# Patient Record
Sex: FEMALE | Race: WHITE | NOT HISPANIC OR LATINO | Employment: UNEMPLOYED | URBAN - METROPOLITAN AREA
[De-identification: names, ages, dates, MRNs, and addresses within clinical notes are randomized per-mention and may not be internally consistent; named-entity substitution may affect disease eponyms.]

---

## 2017-02-16 RX ORDER — BUPROPION HYDROCHLORIDE 300 MG/1
TABLET ORAL
Qty: 30 TABLET | Refills: 3 | Status: SHIPPED | OUTPATIENT
Start: 2017-02-16 | End: 2017-03-14 | Stop reason: SDUPTHER

## 2017-03-03 RX ORDER — TRAZODONE HYDROCHLORIDE 50 MG/1
TABLET ORAL
Qty: 30 TABLET | Refills: 3 | Status: SHIPPED | OUTPATIENT
Start: 2017-03-03 | End: 2017-03-29 | Stop reason: SDUPTHER

## 2017-03-14 ENCOUNTER — TELEPHONE (OUTPATIENT)
Dept: FAMILY MEDICINE CLINIC | Facility: CLINIC | Age: 47
End: 2017-03-14

## 2017-03-14 RX ORDER — BUPROPION HYDROCHLORIDE 300 MG/1
300 TABLET ORAL EVERY MORNING
Qty: 90 TABLET | Refills: 1 | Status: SHIPPED | OUTPATIENT
Start: 2017-03-14 | End: 2018-01-05 | Stop reason: SDUPTHER

## 2017-03-29 RX ORDER — GABAPENTIN 300 MG/1
300 CAPSULE ORAL 3 TIMES DAILY
Qty: 270 CAPSULE | Refills: 1 | Status: SHIPPED | OUTPATIENT
Start: 2017-03-29 | End: 2017-09-27

## 2017-03-29 RX ORDER — BUSPIRONE HYDROCHLORIDE 10 MG/1
10 TABLET ORAL 2 TIMES DAILY
Qty: 180 TABLET | Refills: 1 | Status: SHIPPED | OUTPATIENT
Start: 2017-03-29 | End: 2017-10-13 | Stop reason: SDUPTHER

## 2017-03-29 RX ORDER — TRAZODONE HYDROCHLORIDE 50 MG/1
50 TABLET ORAL NIGHTLY
Qty: 90 TABLET | Refills: 1 | Status: SHIPPED | OUTPATIENT
Start: 2017-03-29 | End: 2017-12-18 | Stop reason: SDUPTHER

## 2017-09-25 DIAGNOSIS — Z00.00 ANNUAL PHYSICAL EXAM: Primary | ICD-10-CM

## 2017-09-26 ENCOUNTER — LAB (OUTPATIENT)
Dept: FAMILY MEDICINE CLINIC | Facility: CLINIC | Age: 47
End: 2017-09-26

## 2017-09-26 ENCOUNTER — APPOINTMENT (OUTPATIENT)
Dept: PREADMISSION TESTING | Facility: HOSPITAL | Age: 47
End: 2017-09-26

## 2017-09-26 DIAGNOSIS — M25.562 LEFT KNEE PAIN: ICD-10-CM

## 2017-09-27 ENCOUNTER — APPOINTMENT (OUTPATIENT)
Dept: PREADMISSION TESTING | Facility: HOSPITAL | Age: 47
End: 2017-09-27

## 2017-09-27 VITALS
TEMPERATURE: 97.8 F | HEART RATE: 66 BPM | SYSTOLIC BLOOD PRESSURE: 121 MMHG | DIASTOLIC BLOOD PRESSURE: 80 MMHG | RESPIRATION RATE: 18 BRPM | OXYGEN SATURATION: 100 % | WEIGHT: 215.6 LBS | HEIGHT: 69 IN | BODY MASS INDEX: 31.93 KG/M2

## 2017-09-27 LAB
25(OH)D3+25(OH)D2 SERPL-MCNC: 59.1 NG/ML (ref 30–100)
ALBUMIN SERPL-MCNC: 4.6 G/DL (ref 3.5–5.2)
ALBUMIN/GLOB SERPL: 1.8 G/DL
ALP SERPL-CCNC: 64 U/L (ref 39–117)
ALT SERPL-CCNC: 16 U/L (ref 1–33)
APPEARANCE UR: CLEAR
AST SERPL-CCNC: 14 U/L (ref 1–32)
BASOPHILS # BLD AUTO: 0.04 10*3/MM3 (ref 0–0.2)
BASOPHILS NFR BLD AUTO: 0.7 % (ref 0–1.5)
BILIRUB SERPL-MCNC: 0.5 MG/DL (ref 0.1–1.2)
BILIRUB UR QL STRIP: NEGATIVE
BUN SERPL-MCNC: 14 MG/DL (ref 6–20)
BUN/CREAT SERPL: 13 (ref 7–25)
CALCIUM SERPL-MCNC: 9.8 MG/DL (ref 8.6–10.5)
CHLORIDE SERPL-SCNC: 101 MMOL/L (ref 98–107)
CHOLEST SERPL-MCNC: 215 MG/DL (ref 0–200)
CO2 SERPL-SCNC: 29.4 MMOL/L (ref 22–29)
COLOR UR: ABNORMAL
CREAT SERPL-MCNC: 1.08 MG/DL (ref 0.57–1)
EOSINOPHIL # BLD AUTO: 0.17 10*3/MM3 (ref 0–0.7)
EOSINOPHIL NFR BLD AUTO: 2.8 % (ref 0.3–6.2)
ERYTHROCYTE [DISTWIDTH] IN BLOOD BY AUTOMATED COUNT: 12.9 % (ref 11.7–13)
GLOBULIN SER CALC-MCNC: 2.6 GM/DL
GLUCOSE SERPL-MCNC: 90 MG/DL (ref 65–99)
GLUCOSE UR QL: NEGATIVE
HCT VFR BLD AUTO: 39.8 % (ref 35.6–45.5)
HDLC SERPL-MCNC: 62 MG/DL (ref 40–60)
HGB BLD-MCNC: 13.3 G/DL (ref 11.9–15.5)
HGB UR QL STRIP: NEGATIVE
IMM GRANULOCYTES # BLD: 0 10*3/MM3 (ref 0–0.03)
IMM GRANULOCYTES NFR BLD: 0 % (ref 0–0.5)
KETONES UR QL STRIP: NEGATIVE
LDLC SERPL CALC-MCNC: 130 MG/DL (ref 0–100)
LDLC/HDLC SERPL: 2.09 {RATIO}
LEUKOCYTE ESTERASE UR QL STRIP: NEGATIVE
LYMPHOCYTES # BLD AUTO: 2.1 10*3/MM3 (ref 0.9–4.8)
LYMPHOCYTES NFR BLD AUTO: 35.1 % (ref 19.6–45.3)
MCH RBC QN AUTO: 30 PG (ref 26.9–32)
MCHC RBC AUTO-ENTMCNC: 33.4 G/DL (ref 32.4–36.3)
MCV RBC AUTO: 89.6 FL (ref 80.5–98.2)
MONOCYTES # BLD AUTO: 0.56 10*3/MM3 (ref 0.2–1.2)
MONOCYTES NFR BLD AUTO: 9.3 % (ref 5–12)
NEUTROPHILS # BLD AUTO: 3.12 10*3/MM3 (ref 1.9–8.1)
NEUTROPHILS NFR BLD AUTO: 52.1 % (ref 42.7–76)
NITRITE UR QL STRIP: NEGATIVE
PH UR STRIP: 5.5 [PH] (ref 5–8)
PLATELET # BLD AUTO: 314 10*3/MM3 (ref 140–500)
POTASSIUM SERPL-SCNC: 4.4 MMOL/L (ref 3.5–5.2)
PROT SERPL-MCNC: 7.2 G/DL (ref 6–8.5)
PROT UR QL STRIP: NEGATIVE
RBC # BLD AUTO: 4.44 10*6/MM3 (ref 3.9–5.2)
SODIUM SERPL-SCNC: 142 MMOL/L (ref 136–145)
SP GR UR: 1.02 (ref 1–1.03)
T4 FREE SERPL-MCNC: 1.27 NG/DL (ref 0.93–1.7)
TRIGL SERPL-MCNC: 116 MG/DL (ref 0–150)
TSH SERPL DL<=0.005 MIU/L-ACNC: 2.93 MIU/ML (ref 0.27–4.2)
UROBILINOGEN UR STRIP-MCNC: ABNORMAL MG/DL
VLDLC SERPL CALC-MCNC: 23.2 MG/DL (ref 5–40)
WBC # BLD AUTO: 5.99 10*3/MM3 (ref 4.5–10.7)

## 2017-09-27 RX ORDER — ELETRIPTAN HYDROBROMIDE 20 MG/1
20 TABLET, FILM COATED ORAL ONCE AS NEEDED
COMMUNITY
End: 2019-10-28

## 2017-09-27 RX ORDER — GABAPENTIN 300 MG/1
300 CAPSULE ORAL EVERY MORNING
COMMUNITY
End: 2017-10-06 | Stop reason: SDUPTHER

## 2017-09-27 RX ORDER — ONDANSETRON HYDROCHLORIDE 8 MG/1
8 TABLET, FILM COATED ORAL EVERY 8 HOURS PRN
COMMUNITY
End: 2022-10-19

## 2017-09-27 RX ORDER — ALBUTEROL SULFATE 90 UG/1
2 AEROSOL, METERED RESPIRATORY (INHALATION) EVERY 4 HOURS PRN
COMMUNITY
End: 2022-03-30 | Stop reason: SDUPTHER

## 2017-09-27 RX ORDER — GABAPENTIN 300 MG/1
600 CAPSULE ORAL NIGHTLY
COMMUNITY
End: 2018-01-02 | Stop reason: SDUPTHER

## 2017-09-27 RX ORDER — NAPROXEN 500 MG/1
500 TABLET ORAL 2 TIMES DAILY
COMMUNITY
End: 2018-03-20 | Stop reason: SDUPTHER

## 2017-10-03 ENCOUNTER — ANESTHESIA EVENT (OUTPATIENT)
Dept: PERIOP | Facility: HOSPITAL | Age: 47
End: 2017-10-03

## 2017-10-03 ENCOUNTER — ANESTHESIA (OUTPATIENT)
Dept: PERIOP | Facility: HOSPITAL | Age: 47
End: 2017-10-03

## 2017-10-03 ENCOUNTER — HOSPITAL ENCOUNTER (OUTPATIENT)
Facility: HOSPITAL | Age: 47
Setting detail: HOSPITAL OUTPATIENT SURGERY
Discharge: HOME | End: 2017-10-03
Attending: ORTHOPAEDIC SURGERY | Admitting: ORTHOPAEDIC SURGERY

## 2017-10-03 VITALS
DIASTOLIC BLOOD PRESSURE: 81 MMHG | RESPIRATION RATE: 16 BRPM | TEMPERATURE: 97.1 F | OXYGEN SATURATION: 95 % | SYSTOLIC BLOOD PRESSURE: 117 MMHG | HEART RATE: 63 BPM

## 2017-10-03 LAB
B-HCG UR QL: NEGATIVE
INTERNAL NEGATIVE CONTROL: NEGATIVE
INTERNAL POSITIVE CONTROL: POSITIVE
Lab: NORMAL

## 2017-10-03 PROCEDURE — 25010000002 ROPIVACAINE PER 1 MG: Performed by: ANESTHESIOLOGY

## 2017-10-03 PROCEDURE — 25010000002 MIDAZOLAM PER 1 MG: Performed by: ANESTHESIOLOGY

## 2017-10-03 PROCEDURE — 25010000002 DEXAMETHASONE PER 1 MG: Performed by: NURSE ANESTHETIST, CERTIFIED REGISTERED

## 2017-10-03 PROCEDURE — 25010000002 PROPOFOL 10 MG/ML EMULSION: Performed by: NURSE ANESTHETIST, CERTIFIED REGISTERED

## 2017-10-03 PROCEDURE — 25010000002 DEXAMETHASONE PER 1 MG: Performed by: ANESTHESIOLOGY

## 2017-10-03 PROCEDURE — 25010000002 MIDAZOLAM PER 1 MG: Performed by: NURSE ANESTHETIST, CERTIFIED REGISTERED

## 2017-10-03 PROCEDURE — 25010000002 ONDANSETRON PER 1 MG: Performed by: NURSE ANESTHETIST, CERTIFIED REGISTERED

## 2017-10-03 PROCEDURE — 25010000002 PROPOFOL 1000 MG/ML EMULSION: Performed by: NURSE ANESTHETIST, CERTIFIED REGISTERED

## 2017-10-03 PROCEDURE — C1713 ANCHOR/SCREW BN/BN,TIS/BN: HCPCS | Performed by: ORTHOPAEDIC SURGERY

## 2017-10-03 PROCEDURE — L3908 WHO COCK-UP NONMOLDE PRE OTS: HCPCS | Performed by: ORTHOPAEDIC SURGERY

## 2017-10-03 PROCEDURE — 25010000002 FENTANYL CITRATE (PF) 100 MCG/2ML SOLUTION: Performed by: ANESTHESIOLOGY

## 2017-10-03 DEVICE — SUT/ANCH JUGGERKNOT SFT RIGID SHT SZ1 1.4MM W/BIT: Type: IMPLANTABLE DEVICE | Site: ELBOW | Status: FUNCTIONAL

## 2017-10-03 RX ORDER — MIDAZOLAM HYDROCHLORIDE 1 MG/ML
1 INJECTION INTRAMUSCULAR; INTRAVENOUS
Status: DISCONTINUED | OUTPATIENT
Start: 2017-10-03 | End: 2017-10-03 | Stop reason: HOSPADM

## 2017-10-03 RX ORDER — LABETALOL HYDROCHLORIDE 5 MG/ML
5 INJECTION, SOLUTION INTRAVENOUS
Status: DISCONTINUED | OUTPATIENT
Start: 2017-10-03 | End: 2017-10-03 | Stop reason: HOSPADM

## 2017-10-03 RX ORDER — FENTANYL CITRATE 50 UG/ML
100 INJECTION, SOLUTION INTRAMUSCULAR; INTRAVENOUS
Status: DISCONTINUED | OUTPATIENT
Start: 2017-10-03 | End: 2017-10-03 | Stop reason: HOSPADM

## 2017-10-03 RX ORDER — PROPOFOL 10 MG/ML
VIAL (ML) INTRAVENOUS AS NEEDED
Status: DISCONTINUED | OUTPATIENT
Start: 2017-10-03 | End: 2017-10-03 | Stop reason: SURG

## 2017-10-03 RX ORDER — SCOLOPAMINE TRANSDERMAL SYSTEM 1 MG/1
1 PATCH, EXTENDED RELEASE TRANSDERMAL ONCE
Status: DISCONTINUED | OUTPATIENT
Start: 2017-10-03 | End: 2017-10-03 | Stop reason: HOSPADM

## 2017-10-03 RX ORDER — MIDAZOLAM HYDROCHLORIDE 1 MG/ML
INJECTION INTRAMUSCULAR; INTRAVENOUS AS NEEDED
Status: DISCONTINUED | OUTPATIENT
Start: 2017-10-03 | End: 2017-10-03 | Stop reason: SURG

## 2017-10-03 RX ORDER — ONDANSETRON 2 MG/ML
4 INJECTION INTRAMUSCULAR; INTRAVENOUS ONCE AS NEEDED
Status: DISCONTINUED | OUTPATIENT
Start: 2017-10-03 | End: 2017-10-03 | Stop reason: HOSPADM

## 2017-10-03 RX ORDER — DIPHENHYDRAMINE HYDROCHLORIDE 50 MG/ML
6.25 INJECTION INTRAMUSCULAR; INTRAVENOUS
Status: DISCONTINUED | OUTPATIENT
Start: 2017-10-03 | End: 2017-10-03 | Stop reason: HOSPADM

## 2017-10-03 RX ORDER — MIDAZOLAM HYDROCHLORIDE 1 MG/ML
2 INJECTION INTRAMUSCULAR; INTRAVENOUS
Status: DISCONTINUED | OUTPATIENT
Start: 2017-10-03 | End: 2017-10-03 | Stop reason: HOSPADM

## 2017-10-03 RX ORDER — ONDANSETRON 2 MG/ML
INJECTION INTRAMUSCULAR; INTRAVENOUS AS NEEDED
Status: DISCONTINUED | OUTPATIENT
Start: 2017-10-03 | End: 2017-10-03 | Stop reason: SURG

## 2017-10-03 RX ORDER — FAMOTIDINE 10 MG/ML
20 INJECTION, SOLUTION INTRAVENOUS ONCE
Status: COMPLETED | OUTPATIENT
Start: 2017-10-03 | End: 2017-10-03

## 2017-10-03 RX ORDER — HYDROMORPHONE HYDROCHLORIDE 1 MG/ML
0.5 INJECTION, SOLUTION INTRAMUSCULAR; INTRAVENOUS; SUBCUTANEOUS
Status: DISCONTINUED | OUTPATIENT
Start: 2017-10-03 | End: 2017-10-03 | Stop reason: HOSPADM

## 2017-10-03 RX ORDER — SODIUM CHLORIDE 0.9 % (FLUSH) 0.9 %
1-10 SYRINGE (ML) INJECTION AS NEEDED
Status: DISCONTINUED | OUTPATIENT
Start: 2017-10-03 | End: 2017-10-03 | Stop reason: HOSPADM

## 2017-10-03 RX ORDER — LIDOCAINE HYDROCHLORIDE 20 MG/ML
INJECTION, SOLUTION INFILTRATION; PERINEURAL AS NEEDED
Status: DISCONTINUED | OUTPATIENT
Start: 2017-10-03 | End: 2017-10-03 | Stop reason: SURG

## 2017-10-03 RX ORDER — LIDOCAINE HYDROCHLORIDE 10 MG/ML
0.5 INJECTION, SOLUTION EPIDURAL; INFILTRATION; INTRACAUDAL; PERINEURAL ONCE AS NEEDED
Status: COMPLETED | OUTPATIENT
Start: 2017-10-03 | End: 2017-10-03

## 2017-10-03 RX ORDER — HYDRALAZINE HYDROCHLORIDE 20 MG/ML
5 INJECTION INTRAMUSCULAR; INTRAVENOUS
Status: DISCONTINUED | OUTPATIENT
Start: 2017-10-03 | End: 2017-10-03 | Stop reason: HOSPADM

## 2017-10-03 RX ORDER — DEXAMETHASONE SODIUM PHOSPHATE 4 MG/ML
INJECTION, SOLUTION INTRA-ARTICULAR; INTRALESIONAL; INTRAMUSCULAR; INTRAVENOUS; SOFT TISSUE AS NEEDED
Status: DISCONTINUED | OUTPATIENT
Start: 2017-10-03 | End: 2017-10-03 | Stop reason: SURG

## 2017-10-03 RX ORDER — OXYCODONE HYDROCHLORIDE AND ACETAMINOPHEN 5; 325 MG/1; MG/1
2 TABLET ORAL ONCE AS NEEDED
Status: DISCONTINUED | OUTPATIENT
Start: 2017-10-03 | End: 2017-10-03 | Stop reason: HOSPADM

## 2017-10-03 RX ORDER — FENTANYL CITRATE 50 UG/ML
50 INJECTION, SOLUTION INTRAMUSCULAR; INTRAVENOUS
Status: DISCONTINUED | OUTPATIENT
Start: 2017-10-03 | End: 2017-10-03 | Stop reason: HOSPADM

## 2017-10-03 RX ORDER — HYDROCODONE BITARTRATE AND ACETAMINOPHEN 7.5; 325 MG/1; MG/1
1 TABLET ORAL ONCE AS NEEDED
Status: DISCONTINUED | OUTPATIENT
Start: 2017-10-03 | End: 2017-10-03 | Stop reason: HOSPADM

## 2017-10-03 RX ORDER — BUPIVACAINE HYDROCHLORIDE AND EPINEPHRINE 2.5; 5 MG/ML; UG/ML
INJECTION, SOLUTION EPIDURAL; INFILTRATION; INTRACAUDAL; PERINEURAL AS NEEDED
Status: DISCONTINUED | OUTPATIENT
Start: 2017-10-03 | End: 2017-10-03 | Stop reason: SURG

## 2017-10-03 RX ORDER — EPHEDRINE SULFATE 50 MG/ML
5 INJECTION, SOLUTION INTRAVENOUS ONCE AS NEEDED
Status: DISCONTINUED | OUTPATIENT
Start: 2017-10-03 | End: 2017-10-03 | Stop reason: HOSPADM

## 2017-10-03 RX ORDER — CLINDAMYCIN PHOSPHATE 900 MG/50ML
900 INJECTION INTRAVENOUS ONCE
Status: COMPLETED | OUTPATIENT
Start: 2017-10-03 | End: 2017-10-03

## 2017-10-03 RX ORDER — FLUMAZENIL 0.1 MG/ML
0.2 INJECTION INTRAVENOUS AS NEEDED
Status: DISCONTINUED | OUTPATIENT
Start: 2017-10-03 | End: 2017-10-03 | Stop reason: HOSPADM

## 2017-10-03 RX ORDER — DEXAMETHASONE SODIUM PHOSPHATE 10 MG/ML
INJECTION INTRAMUSCULAR; INTRAVENOUS AS NEEDED
Status: DISCONTINUED | OUTPATIENT
Start: 2017-10-03 | End: 2017-10-03 | Stop reason: SURG

## 2017-10-03 RX ORDER — SODIUM CHLORIDE, SODIUM LACTATE, POTASSIUM CHLORIDE, CALCIUM CHLORIDE 600; 310; 30; 20 MG/100ML; MG/100ML; MG/100ML; MG/100ML
9 INJECTION, SOLUTION INTRAVENOUS CONTINUOUS
Status: DISCONTINUED | OUTPATIENT
Start: 2017-10-03 | End: 2017-10-03 | Stop reason: HOSPADM

## 2017-10-03 RX ORDER — ROPIVACAINE HYDROCHLORIDE 5 MG/ML
INJECTION, SOLUTION EPIDURAL; INFILTRATION; PERINEURAL AS NEEDED
Status: DISCONTINUED | OUTPATIENT
Start: 2017-10-03 | End: 2017-10-03 | Stop reason: SURG

## 2017-10-03 RX ADMIN — ROPIVACAINE HYDROCHLORIDE 30 ML: 5 INJECTION, SOLUTION EPIDURAL; INFILTRATION; PERINEURAL at 13:05

## 2017-10-03 RX ADMIN — DEXAMETHASONE SODIUM PHOSPHATE 4 MG: 4 INJECTION, SOLUTION INTRAMUSCULAR; INTRAVENOUS at 13:05

## 2017-10-03 RX ADMIN — FENTANYL CITRATE 50 MCG: 50 INJECTION INTRAMUSCULAR; INTRAVENOUS at 11:26

## 2017-10-03 RX ADMIN — DEXAMETHASONE SODIUM PHOSPHATE 4 MG: 10 INJECTION INTRAMUSCULAR; INTRAVENOUS at 13:50

## 2017-10-03 RX ADMIN — ONDANSETRON 4 MG: 2 INJECTION INTRAMUSCULAR; INTRAVENOUS at 14:01

## 2017-10-03 RX ADMIN — LIDOCAINE HYDROCHLORIDE 100 MG: 20 INJECTION, SOLUTION INFILTRATION; PERINEURAL at 13:25

## 2017-10-03 RX ADMIN — PROPOFOL 200 MG: 10 INJECTION, EMULSION INTRAVENOUS at 13:25

## 2017-10-03 RX ADMIN — SCOPALAMINE 1 PATCH: 1 PATCH, EXTENDED RELEASE TRANSDERMAL at 11:21

## 2017-10-03 RX ADMIN — SODIUM CHLORIDE, POTASSIUM CHLORIDE, SODIUM LACTATE AND CALCIUM CHLORIDE 9 ML/HR: 600; 310; 30; 20 INJECTION, SOLUTION INTRAVENOUS at 11:19

## 2017-10-03 RX ADMIN — MIDAZOLAM 2 MG: 1 INJECTION INTRAMUSCULAR; INTRAVENOUS at 11:26

## 2017-10-03 RX ADMIN — SODIUM CHLORIDE, POTASSIUM CHLORIDE, SODIUM LACTATE AND CALCIUM CHLORIDE: 600; 310; 30; 20 INJECTION, SOLUTION INTRAVENOUS at 14:00

## 2017-10-03 RX ADMIN — MIDAZOLAM HYDROCHLORIDE 2 MG: 1 INJECTION, SOLUTION INTRAMUSCULAR; INTRAVENOUS at 13:25

## 2017-10-03 RX ADMIN — BUPIVACAINE HYDROCHLORIDE AND EPINEPHRINE BITARTRATE 10 ML: 2.5; .0091 INJECTION, SOLUTION EPIDURAL; INFILTRATION; INTRACAUDAL; PERINEURAL at 13:05

## 2017-10-03 RX ADMIN — LIDOCAINE HYDROCHLORIDE 0.5 ML: 10 INJECTION, SOLUTION EPIDURAL; INFILTRATION; INTRACAUDAL; PERINEURAL at 11:19

## 2017-10-03 RX ADMIN — PROPOFOL 200 MCG/KG/MIN: 10 INJECTION, EMULSION INTRAVENOUS at 13:25

## 2017-10-03 RX ADMIN — FAMOTIDINE 20 MG: 10 INJECTION, SOLUTION INTRAVENOUS at 11:22

## 2017-10-03 RX ADMIN — CLINDAMYCIN PHOSPHATE 900 MG: 900 INJECTION INTRAVENOUS at 13:23

## 2017-10-03 NOTE — PLAN OF CARE
Problem: Patient Care Overview (Adult)  Goal: Discharge Needs Assessment  Outcome: Outcome(s) achieved Date Met:  10/03/17    10/03/17 1526   Discharge Needs Assessment   Concerns To Be Addressed denies needs/concerns at this time   Living Environment   Transportation Available car

## 2017-10-03 NOTE — BRIEF OP NOTE
TENNIS ELBOW RELEASE  Progress Note    Eugenie Rapp  10/3/2017    Pre-op Diagnosis:   Lt ext tendon tear and lat epi    Post-op Diagnosis:     same    Procedure/CPT® Codes:      Procedure(s):  LEFT ELBOW OPEN EXTENSOR TENDON DEBRIDEMENT AND REPAIR    Surgeon(s):  TASH Aly MD    Anesthesia: General with Block    Staff:   Circulator: Yenifer Rosen RN  Scrub Person: Mariangel Hung  Vendor Representative: Lindsay Ventura  Assistant: Annetet Monroy    Estimated Blood Loss: minimal    Urine Voided: * No values recorded between 10/3/2017  1:14 PM and 10/3/2017  1:59 PM *    Specimens:                None      Drains:           Findings: above    Complications: none known      PETER Aly MD     Date: 10/3/2017  Time: 1:59 PM

## 2017-10-03 NOTE — PLAN OF CARE
Problem: Patient Care Overview (Adult)  Goal: Plan of Care Review  Outcome: Outcome(s) achieved Date Met:  10/03/17    10/03/17 1526   Coping/Psychosocial Response Interventions   Plan Of Care Reviewed With patient;spouse   Patient Care Overview   Progress improving

## 2017-10-03 NOTE — ANESTHESIA POSTPROCEDURE EVALUATION
Patient: Eugenie Rapp    Procedure Summary     Date Anesthesia Start Anesthesia Stop Room / Location    10/03/17 [id number]  [facility]       Procedure Diagnosis Surgeon Provider    LEFT ELBOW OPEN EXTENSOR TENDON DEBRIDEMENT AND REPAIR (Left Elbow) No diagnosis on file. MD Mayco Prince MD          Anesthesia Type: general  Last vitals  BP   117/81 (10/03/17 1515)    Temp        Pulse   63 (10/03/17 1515)   Resp   16 (10/03/17 1515)    SpO2   95 % (10/03/17 1515)      Post Anesthesia Care and Evaluation    Patient location during evaluation: bedside  Patient participation: complete - patient participated  Level of consciousness: awake  Pain score: 1  Pain management: adequate  Airway patency: patent  Anesthetic complications: No anesthetic complications    Cardiovascular status: acceptable  Respiratory status: acceptable  Hydration status: acceptable    Comments: --------------------            10/03/17               1515     --------------------   BP:       117/81     Pulse:      63       Resp:       16       Temp:                SpO2:      95%      --------------------

## 2017-10-03 NOTE — ANESTHESIA PREPROCEDURE EVALUATION
Anesthesia Evaluation     Patient summary reviewed and Nursing notes reviewed   history of anesthetic complications: PONV  NPO Solid Status: > 8 hours       Airway   Mallampati: II  TM distance: >3 FB  Neck ROM: full  no difficulty expected  Dental - normal exam     Pulmonary - negative pulmonary ROS and normal exam   Cardiovascular - negative cardio ROS and normal exam        Neuro/Psych- negative ROS  GI/Hepatic/Renal/Endo - negative ROS     Musculoskeletal (-) negative ROS    Abdominal  - normal exam   Substance History - negative use     OB/GYN negative ob/gyn ROS         Other                                      Anesthesia Plan    ASA 2     general   total IV anesthesia(Bp popc)  intravenous induction   Anesthetic plan and risks discussed with patient.    Plan discussed with CRNA.

## 2017-10-03 NOTE — ANESTHESIA PROCEDURE NOTES
Peripheral Block    Patient location during procedure: pre-op  Start time: 10/3/2017 12:52 PM  Stop time: 10/3/2017 1:05 PM  Reason for block: at surgeon's request and post-op pain management  Performed by  Anesthesiologist: CAT JC  Preanesthetic Checklist  Completed: patient identified, site marked, surgical consent, pre-op evaluation, timeout performed, IV checked, risks and benefits discussed and monitors and equipment checked  Prep:  Sterile barriers:gloves and cap  Prep: ChloraPrep  Patient monitoring: blood pressure monitoring, continuous pulse oximetry and EKG  Procedure  Sedation:yes    Guidance:ultrasound guided  ULTRASOUND INTERPRETATION.  Using ultrasound guidance a 21 G gauge needle was placed in close proximity to the brachial plexus nerve, at which point, under ultrasound guidance anesthetic was injected in the area of the nerve and spread of the anesthesia was seen on ultrasound in close proximity thereto.  There were no abnormalities seen on ultrasound; a digital image was taken; and the patient tolerated the procedure with no complications. Images:still images obtained    Laterality:left  Block Type:supraclavicular  Injection Technique:single-shot  Needle Type:echogenic  Needle Gauge:21 G    Medications  Comment:Dexamethasone 4 mg  Local Injected:ropivacaine 0.5% without epinephrine and bupivacaine 0.25% with epinephrine Local Amount Injected:40mL  Post Assessment  Injection Assessment: negative aspiration for heme, no paresthesia on injection and incremental injection  Patient Tolerance:comfortable throughout block  Complications:no  Additional Notes  Ultrasound interpretation:  Needle was seen in close proximity to nerve.  Local anesthetic seen surrounding area of nerve.  No abnormalities noted.  Still image obtained.

## 2017-10-03 NOTE — PLAN OF CARE
Problem: Patient Care Overview (Adult)  Goal: Plan of Care Review  Outcome: Ongoing (interventions implemented as appropriate)    10/03/17 1030   Coping/Psychosocial Response Interventions   Plan Of Care Reviewed With patient       Goal: Discharge Needs Assessment  Outcome: Ongoing (interventions implemented as appropriate)    10/03/17 1030   Discharge Needs Assessment   Concerns To Be Addressed denies needs/concerns at this time   Living Environment   Transportation Available car

## 2017-10-03 NOTE — PLAN OF CARE
Problem: Perioperative Period (Adult)  Goal: Signs and Symptoms of Listed Potential Problems Will be Absent or Manageable (Perioperative Period)  Outcome: Ongoing (interventions implemented as appropriate)    10/03/17 1030   Perioperative Period   Problems Assessed (Perioperative Period) all

## 2017-10-03 NOTE — PLAN OF CARE
Problem: Perioperative Period (Adult)  Goal: Signs and Symptoms of Listed Potential Problems Will be Absent or Manageable (Perioperative Period)  Outcome: Outcome(s) achieved Date Met:  10/03/17    10/03/17 1526   Perioperative Period   Problems Assessed (Perioperative Period) all   Problems Present (Perioperative Period) none

## 2017-10-03 NOTE — ANESTHESIA PROCEDURE NOTES
Airway  Urgency: elective    Airway not difficult    General Information and Staff    Patient location during procedure: OR  Anesthesiologist: TRISHA PATINO  CRNA: MIGUEL GEORGE    Indications and Patient Condition  Indications for airway management: airway protection    Preoxygenated: yes  MILS not maintained throughout  Mask difficulty assessment: 1 - vent by mask    Final Airway Details  Final airway type: supraglottic airway      Successful airway: classic  Size 4    Number of attempts at approach: 1    Additional Comments  Lma insertion appears atraumatic. Dentition intact.

## 2017-10-06 ENCOUNTER — TELEPHONE (OUTPATIENT)
Dept: FAMILY MEDICINE CLINIC | Facility: CLINIC | Age: 47
End: 2017-10-06

## 2017-10-06 ENCOUNTER — OFFICE VISIT (OUTPATIENT)
Dept: FAMILY MEDICINE CLINIC | Facility: CLINIC | Age: 47
End: 2017-10-06

## 2017-10-06 VITALS
SYSTOLIC BLOOD PRESSURE: 120 MMHG | OXYGEN SATURATION: 98 % | HEART RATE: 64 BPM | HEIGHT: 69 IN | TEMPERATURE: 98.6 F | BODY MASS INDEX: 32.66 KG/M2 | DIASTOLIC BLOOD PRESSURE: 70 MMHG | WEIGHT: 220.5 LBS

## 2017-10-06 DIAGNOSIS — K21.9 GASTROESOPHAGEAL REFLUX DISEASE, ESOPHAGITIS PRESENCE NOT SPECIFIED: ICD-10-CM

## 2017-10-06 DIAGNOSIS — Z00.00 WELL ADULT EXAM: Primary | ICD-10-CM

## 2017-10-06 DIAGNOSIS — M25.551 CHRONIC ARTHRALGIAS OF KNEES AND HIPS: ICD-10-CM

## 2017-10-06 DIAGNOSIS — M25.552 CHRONIC ARTHRALGIAS OF KNEES AND HIPS: ICD-10-CM

## 2017-10-06 DIAGNOSIS — M25.561 CHRONIC ARTHRALGIAS OF KNEES AND HIPS: ICD-10-CM

## 2017-10-06 DIAGNOSIS — M25.562 CHRONIC ARTHRALGIAS OF KNEES AND HIPS: ICD-10-CM

## 2017-10-06 DIAGNOSIS — G89.29 CHRONIC ARTHRALGIAS OF KNEES AND HIPS: ICD-10-CM

## 2017-10-06 PROCEDURE — 99396 PREV VISIT EST AGE 40-64: CPT | Performed by: INTERNAL MEDICINE

## 2017-10-06 RX ORDER — LIDOCAINE 50 MG/G
1 PATCH TOPICAL EVERY 24 HOURS
Qty: 30 PATCH | Refills: 4 | Status: SHIPPED | OUTPATIENT
Start: 2017-10-06 | End: 2018-01-31 | Stop reason: SDUPTHER

## 2017-10-06 RX ORDER — SCOLOPAMINE TRANSDERMAL SYSTEM 1 MG/1
1 PATCH, EXTENDED RELEASE TRANSDERMAL
Qty: 2 PATCH | Refills: 0 | Status: SHIPPED | OUTPATIENT
Start: 2017-10-06 | End: 2018-01-31

## 2017-10-06 RX ORDER — OMEPRAZOLE 40 MG/1
40 CAPSULE, DELAYED RELEASE ORAL DAILY
COMMUNITY
End: 2017-10-07 | Stop reason: SDUPTHER

## 2017-10-06 RX ORDER — GABAPENTIN 300 MG/1
CAPSULE ORAL
Qty: 360 CAPSULE | Refills: 0 | Status: SHIPPED | OUTPATIENT
Start: 2017-10-06 | End: 2018-01-02 | Stop reason: SDUPTHER

## 2017-10-06 NOTE — PROGRESS NOTES
Subjective   Eugenie Rapp is a 46 y.o. female who comes in today for   Chief Complaint   Patient presents with   • Annual Exam   .    History of Present Illness   Here for CPE.  Just had left extensor tendon repair on Tuesday with Dr. Mansoor Aly which well.  She is taking 4000 iu/day of Vit D which is helping.   C/o multiple aches in feet (plantar fasciitis) has seen podiatrist and foot ortho and has some inserts which initially helped but no longer helping, knees (left knee needs replacement soon), back pain.   concerned that there may be something else going on that is causing her arthralgias and myalgias.  Has used some otc lidocaine patches with some relief.  Having some break through GERD sx's with the 20mg qd omeprazole and wanting to go to 40mg qd.    Going on a cruise and wanting scopolamine patch           The following portions of the patient's history were reviewed and updated as appropriate: allergies, current medications, past family history, past medical history, past social history, past surgical history and problem list.    Review of Systems   Gastrointestinal:        Worsening reflux sx's   Musculoskeletal: Positive for arthralgias and myalgias.        If doesn't take naprosyn she has a lot of arthralgias and myalgias.     All other systems reviewed and are negative.      Vitals:    10/06/17 1201   BP: 120/70   Pulse: 64   Temp: 98.6 °F (37 °C)   SpO2: 98%       Objective   Physical Exam   Constitutional: She is oriented to person, place, and time. She appears well-developed and well-nourished.   HENT:   Head: Normocephalic and atraumatic.   Right Ear: External ear normal.   Left Ear: External ear normal.   Mouth/Throat: Oropharynx is clear and moist.   Eyes: Conjunctivae and EOM are normal. Pupils are equal, round, and reactive to light.   Neck: Neck supple. No thyromegaly present.   Cardiovascular: Normal rate, regular rhythm, normal heart sounds and intact distal pulses.     Pulmonary/Chest: Effort normal and breath sounds normal.   Abdominal: Soft. Bowel sounds are normal. She exhibits no distension and no mass. There is no tenderness.   Lymphadenopathy:     She has no cervical adenopathy.   Neurological: She is alert and oriented to person, place, and time. She has normal reflexes.   Skin: Skin is warm.   Psychiatric: She has a normal mood and affect. Her behavior is normal. Judgment and thought content normal.   Nursing note and vitals reviewed.      Assessment/Plan   Eugenie was seen today for annual exam.    Diagnoses and all orders for this visit:    Well adult exam    Gastroesophageal reflux disease, esophagitis presence not specified    Chronic arthralgias of knees and hips  -     REGIS  -     Sedimentation rate, automated  -     Rheumatoid Factor, Quant  -     Uric Acid  -     Ambulatory Referral to Rheumatology    Other orders  -     lidocaine (LIDODERM) 5 %; Place 1 patch on the skin Daily. Remove & Discard patch within 12 hours or as directed by MD  -     Scopolamine (TRANSDERM-SCOP, 1.5 MG,) 1.5 MG/3DAYS patch; Place 1 patch on the skin Every 72 (Seventy-Two) Hours.  -     gabapentin (NEURONTIN) 300 MG capsule; Take 2 tablets in the morning and 2 tablets in the evening  -     omeprazole (priLOSEC) 40 MG capsule; Take 1 capsule by mouth Daily.      Scop patches for her upcoming cruise and will give #2 in case she needs an additional one later.  Advised 3 day max  increae omeprazole to 40mg qd  Rheum work up and referral due to arthralgias and myalgias.  Think some of her pain is Fibro and we will increase her gabapentin to 600mg bid/refilled    lidoderm patches ordered today as well for pain  mammo and pap encouraged with her gyn.  She hasn't made those appt past year or so               I have asked for the patient to return to clinic in 6month(s).

## 2017-10-06 NOTE — TELEPHONE ENCOUNTER
Please send a refill for omeprazole 40mg to [facility].

## 2017-10-07 RX ORDER — OMEPRAZOLE 40 MG/1
40 CAPSULE, DELAYED RELEASE ORAL DAILY
Qty: 90 CAPSULE | Refills: 1 | Status: SHIPPED | OUTPATIENT
Start: 2017-10-07 | End: 2018-01-31

## 2017-10-09 LAB
ANA SER QL: NEGATIVE
ERYTHROCYTE [SEDIMENTATION RATE] IN BLOOD BY WESTERGREN METHOD: 16 MM/HR (ref 0–20)
RHEUMATOID FACT SERPL-ACNC: <10 IU/ML (ref 0–13.9)
URATE SERPL-MCNC: 5.8 MG/DL (ref 2.4–5.7)

## 2017-10-16 RX ORDER — BUSPIRONE HYDROCHLORIDE 10 MG/1
TABLET ORAL
Qty: 180 TABLET | Refills: 1 | Status: SHIPPED | OUTPATIENT
Start: 2017-10-16 | End: 2018-04-16 | Stop reason: SDUPTHER

## 2017-11-16 DIAGNOSIS — E79.0 ELEVATED URIC ACID IN BLOOD: Primary | ICD-10-CM

## 2017-11-17 LAB — URATE SERPL-MCNC: 5.5 MG/DL (ref 2.4–5.7)

## 2017-12-18 RX ORDER — TRAZODONE HYDROCHLORIDE 50 MG/1
TABLET ORAL
Qty: 90 TABLET | Refills: 2 | Status: SHIPPED | OUTPATIENT
Start: 2017-12-18 | End: 2018-09-22 | Stop reason: SDUPTHER

## 2018-01-02 RX ORDER — GABAPENTIN 600 MG/1
TABLET ORAL
Qty: 180 TABLET | Refills: 0 | Status: SHIPPED | OUTPATIENT
Start: 2018-01-02 | End: 2018-04-04 | Stop reason: SDUPTHER

## 2018-01-05 RX ORDER — BUPROPION HYDROCHLORIDE 300 MG/1
TABLET ORAL
Qty: 90 TABLET | Refills: 1 | Status: SHIPPED | OUTPATIENT
Start: 2018-01-05 | End: 2018-07-03 | Stop reason: SDUPTHER

## 2018-01-31 ENCOUNTER — OFFICE VISIT (OUTPATIENT)
Dept: FAMILY MEDICINE CLINIC | Facility: CLINIC | Age: 48
End: 2018-01-31

## 2018-01-31 VITALS
OXYGEN SATURATION: 95 % | SYSTOLIC BLOOD PRESSURE: 118 MMHG | HEIGHT: 69 IN | TEMPERATURE: 98.3 F | BODY MASS INDEX: 31.92 KG/M2 | WEIGHT: 215.5 LBS | DIASTOLIC BLOOD PRESSURE: 80 MMHG | RESPIRATION RATE: 16 BRPM | HEART RATE: 83 BPM

## 2018-01-31 DIAGNOSIS — K21.9 GASTROESOPHAGEAL REFLUX DISEASE, ESOPHAGITIS PRESENCE NOT SPECIFIED: ICD-10-CM

## 2018-01-31 DIAGNOSIS — M79.7 FIBROMYALGIA: Primary | ICD-10-CM

## 2018-01-31 DIAGNOSIS — R07.0 THROAT BURNING: ICD-10-CM

## 2018-01-31 DIAGNOSIS — R05.9 COUGH: ICD-10-CM

## 2018-01-31 PROCEDURE — 99214 OFFICE O/P EST MOD 30 MIN: CPT | Performed by: INTERNAL MEDICINE

## 2018-01-31 RX ORDER — FAMOTIDINE 20 MG/1
20 TABLET, FILM COATED ORAL 2 TIMES DAILY
Qty: 60 TABLET | Refills: 5 | Status: SHIPPED | OUTPATIENT
Start: 2018-01-31 | End: 2018-10-25 | Stop reason: ALTCHOICE

## 2018-01-31 RX ORDER — LIDOCAINE 50 MG/G
1 PATCH TOPICAL EVERY 24 HOURS
Qty: 30 PATCH | Refills: 6 | Status: SHIPPED | OUTPATIENT
Start: 2018-01-31 | End: 2019-02-07 | Stop reason: SDUPTHER

## 2018-01-31 RX ORDER — BUDESONIDE AND FORMOTEROL FUMARATE DIHYDRATE 160; 4.5 UG/1; UG/1
2 AEROSOL RESPIRATORY (INHALATION)
Qty: 10.2 G | Refills: 6 | Status: SHIPPED | OUTPATIENT
Start: 2018-01-31 | End: 2019-10-28

## 2018-01-31 RX ORDER — FAMOTIDINE 20 MG/1
20 TABLET, FILM COATED ORAL 2 TIMES DAILY
COMMUNITY
End: 2018-01-31 | Stop reason: SDUPTHER

## 2018-01-31 RX ORDER — PANTOPRAZOLE SODIUM 40 MG/1
40 TABLET, DELAYED RELEASE ORAL DAILY
Qty: 90 TABLET | Refills: 0 | Status: SHIPPED | OUTPATIENT
Start: 2018-01-31 | End: 2018-04-16 | Stop reason: SDUPTHER

## 2018-01-31 NOTE — PROGRESS NOTES
Subjective   Eugenie Rapp is a 47 y.o. female who comes in today for   Chief Complaint   Patient presents with   • Asthma   • Fibromyalgia   .    History of Present Illness   Had the flu in Dec 2017 and she is still coughing, although improved,  from that time.  Her cough is productive of white mucous off and on all day.  After 6 weeks of coughing, she is finally improving.  Had to use albuterol frequently during this illness.  Not currently however, No wheezing.  Coughing is better but still having white mucous.  GERD and taking 20mg bid of pepcid.  Throat burns a lot b/c of reflux.  Saw  and he officially said that she has fibromyalgia and that I can handle it.  Sleeps well with trazodone.  Restarted yoga a few weeks ago  The following portions of the patient's history were reviewed and updated as appropriate: allergies, current medications, past family history, past medical history, past social history, past surgical history and problem list.    Review of Systems   Constitutional: Negative.    Respiratory: Positive for cough. Negative for wheezing.    Gastrointestinal:        Reflux at times especially if she leans forward after eating       Vitals:    01/31/18 1314   BP: 118/80   Pulse: 83   Resp: 16   Temp: 98.3 °F (36.8 °C)   SpO2: 95%       Objective   Physical Exam   Constitutional: She is oriented to person, place, and time. She appears well-developed and well-nourished.   HENT:   Head: Normocephalic and atraumatic.   Right Ear: External ear normal.   Left Ear: External ear normal.   Mouth/Throat: Oropharynx is clear and moist.   Eyes: Conjunctivae are normal.   Neck: Neck supple.   Cardiovascular: Normal rate, regular rhythm and normal heart sounds.    Pulmonary/Chest: Effort normal and breath sounds normal.   Neurological: She is alert and oriented to person, place, and time.   Skin: Skin is warm.   Psychiatric: She has a normal mood and affect. Her behavior is normal. Judgment and thought  content normal.   Nursing note and vitals reviewed.      Assessment/Plan   Eugenie was seen today for asthma and fibromyalgia.    Diagnoses and all orders for this visit:    Fibromyalgia    Gastroesophageal reflux disease, esophagitis presence not specified    Cough  -     US Thyroid; Future    Throat burning  -     US Thyroid; Future    Other orders  -     pantoprazole (PROTONIX) 40 MG EC tablet; Take 1 tablet by mouth Daily.  -     lidocaine (LIDODERM) 5 %; Place 1 patch on the skin Daily. Remove & Discard patch within 12 hours or as directed by MD  -     budesonide-formoterol (SYMBICORT) 160-4.5 MCG/ACT inhaler; Inhale 2 puffs 2 (Two) Times a Day.  -     famotidine (PEPCID) 20 MG tablet; Take 1 tablet by mouth 2 (Two) Times a Day.      Start protonix 40mg qd and can continue pepcid 20mg bid to settle her GERD down a little in case this is making her coughing and RAD worse.    Evaluate thyroid b/c to me, her thyroid feels a little generous and it could be causing AW constraints with her coughing.  No evidence of infection at this time so antibiotics aren't warranted  For fibro, I have asked her to increase her exercise in some capacity.  Swimming or water aerobics, bike.  Also good sleep helps with pain control.  We could increase gabapentin to tid but i'm afraid fatigue will worsen  Refill lido patches  Start symbicort as well and if sx's persist, she will need PFT's and CXR           I have asked for the patient to return to clinic in 6month(s).

## 2018-02-09 ENCOUNTER — HOSPITAL ENCOUNTER (OUTPATIENT)
Dept: ULTRASOUND IMAGING | Facility: HOSPITAL | Age: 48
Discharge: HOME | End: 2018-02-09
Admitting: INTERNAL MEDICINE

## 2018-02-09 DIAGNOSIS — R07.0 THROAT BURNING: ICD-10-CM

## 2018-02-09 DIAGNOSIS — R05.9 COUGH: ICD-10-CM

## 2018-02-09 PROCEDURE — 76536 US EXAM OF HEAD AND NECK: CPT

## 2018-03-20 RX ORDER — NAPROXEN 500 MG/1
500 TABLET ORAL 2 TIMES DAILY WITH MEALS
Qty: 60 TABLET | Refills: 5 | Status: SHIPPED | OUTPATIENT
Start: 2018-03-20 | End: 2019-02-07 | Stop reason: SDUPTHER

## 2018-04-04 RX ORDER — GABAPENTIN 600 MG/1
TABLET ORAL
Qty: 180 TABLET | Refills: 0 | Status: SHIPPED | OUTPATIENT
Start: 2018-04-04 | End: 2018-07-03 | Stop reason: SDUPTHER

## 2018-04-16 RX ORDER — BUSPIRONE HYDROCHLORIDE 10 MG/1
TABLET ORAL
Qty: 180 TABLET | Refills: 1 | Status: SHIPPED | OUTPATIENT
Start: 2018-04-16 | End: 2018-09-22 | Stop reason: SDUPTHER

## 2018-04-16 RX ORDER — PANTOPRAZOLE SODIUM 40 MG/1
TABLET, DELAYED RELEASE ORAL
Qty: 90 TABLET | Refills: 1 | Status: SHIPPED | OUTPATIENT
Start: 2018-04-16 | End: 2018-09-22 | Stop reason: SDUPTHER

## 2018-07-05 RX ORDER — GABAPENTIN 600 MG/1
TABLET ORAL
Qty: 180 TABLET | Refills: 0 | Status: SHIPPED | OUTPATIENT
Start: 2018-07-05 | End: 2018-09-22 | Stop reason: SDUPTHER

## 2018-07-05 RX ORDER — BUPROPION HYDROCHLORIDE 300 MG/1
TABLET ORAL
Qty: 90 TABLET | Refills: 0 | Status: SHIPPED | OUTPATIENT
Start: 2018-07-05 | End: 2018-09-22 | Stop reason: SDUPTHER

## 2018-09-24 RX ORDER — TRAZODONE HYDROCHLORIDE 50 MG/1
TABLET ORAL
Qty: 90 TABLET | Refills: 0 | Status: SHIPPED | OUTPATIENT
Start: 2018-09-24 | End: 2018-12-10 | Stop reason: SDUPTHER

## 2018-09-24 RX ORDER — BUSPIRONE HYDROCHLORIDE 10 MG/1
TABLET ORAL
Qty: 180 TABLET | Refills: 0 | Status: SHIPPED | OUTPATIENT
Start: 2018-09-24 | End: 2018-12-10 | Stop reason: SDUPTHER

## 2018-09-24 RX ORDER — GABAPENTIN 600 MG/1
TABLET ORAL
Qty: 180 TABLET | Refills: 0 | Status: SHIPPED | OUTPATIENT
Start: 2018-09-24 | End: 2018-12-31 | Stop reason: SDUPTHER

## 2018-09-24 RX ORDER — BUPROPION HYDROCHLORIDE 300 MG/1
TABLET ORAL
Qty: 90 TABLET | Refills: 0 | Status: SHIPPED | OUTPATIENT
Start: 2018-09-24 | End: 2018-12-10 | Stop reason: SDUPTHER

## 2018-09-24 RX ORDER — PANTOPRAZOLE SODIUM 40 MG/1
TABLET, DELAYED RELEASE ORAL
Qty: 30 TABLET | Refills: 0 | Status: SHIPPED | OUTPATIENT
Start: 2018-09-24 | End: 2018-10-25 | Stop reason: ALTCHOICE

## 2018-10-25 RX ORDER — PANTOPRAZOLE SODIUM 40 MG/1
TABLET, DELAYED RELEASE ORAL
Qty: 90 TABLET | Refills: 0 | Status: SHIPPED | OUTPATIENT
Start: 2018-10-25 | End: 2019-01-30 | Stop reason: SDUPTHER

## 2018-12-10 RX ORDER — BUSPIRONE HYDROCHLORIDE 10 MG/1
TABLET ORAL
Qty: 180 TABLET | Refills: 0 | Status: SHIPPED | OUTPATIENT
Start: 2018-12-10 | End: 2019-02-07 | Stop reason: SDUPTHER

## 2018-12-10 RX ORDER — TRAZODONE HYDROCHLORIDE 50 MG/1
TABLET ORAL
Qty: 90 TABLET | Refills: 0 | Status: SHIPPED | OUTPATIENT
Start: 2018-12-10 | End: 2019-03-23 | Stop reason: SDUPTHER

## 2018-12-10 RX ORDER — BUPROPION HYDROCHLORIDE 300 MG/1
TABLET ORAL
Qty: 90 TABLET | Refills: 0 | Status: SHIPPED | OUTPATIENT
Start: 2018-12-10 | End: 2019-03-23 | Stop reason: SDUPTHER

## 2019-01-02 RX ORDER — GABAPENTIN 600 MG/1
TABLET ORAL
Qty: 180 TABLET | Refills: 0 | Status: SHIPPED | OUTPATIENT
Start: 2019-01-02 | End: 2019-03-23 | Stop reason: SDUPTHER

## 2019-01-31 RX ORDER — PANTOPRAZOLE SODIUM 40 MG/1
TABLET, DELAYED RELEASE ORAL
Qty: 90 TABLET | Refills: 0 | Status: SHIPPED | OUTPATIENT
Start: 2019-01-31 | End: 2019-05-21 | Stop reason: SDUPTHER

## 2019-02-01 DIAGNOSIS — Z00.00 WELL ADULT EXAM: Primary | ICD-10-CM

## 2019-02-01 DIAGNOSIS — E78.5 HYPERLIPIDEMIA, UNSPECIFIED HYPERLIPIDEMIA TYPE: ICD-10-CM

## 2019-02-02 LAB
25(OH)D3+25(OH)D2 SERPL-MCNC: 66.1 NG/ML (ref 30–100)
ALBUMIN SERPL-MCNC: 4.3 G/DL (ref 3.5–5.2)
ALBUMIN/GLOB SERPL: 2 G/DL
ALP SERPL-CCNC: 58 U/L (ref 39–117)
ALT SERPL-CCNC: 17 U/L (ref 1–33)
AST SERPL-CCNC: 19 U/L (ref 1–32)
BASOPHILS # BLD AUTO: 0.02 10*3/MM3 (ref 0–0.2)
BASOPHILS NFR BLD AUTO: 0.4 % (ref 0–1.5)
BILIRUB SERPL-MCNC: 0.5 MG/DL (ref 0.1–1.2)
BUN SERPL-MCNC: 18 MG/DL (ref 6–20)
BUN/CREAT SERPL: 18.9 (ref 7–25)
CALCIUM SERPL-MCNC: 9.5 MG/DL (ref 8.6–10.5)
CHLORIDE SERPL-SCNC: 104 MMOL/L (ref 98–107)
CHOLEST SERPL-MCNC: 180 MG/DL (ref 0–200)
CO2 SERPL-SCNC: 25.6 MMOL/L (ref 22–29)
CREAT SERPL-MCNC: 0.95 MG/DL (ref 0.57–1)
EOSINOPHIL # BLD AUTO: 0.21 10*3/MM3 (ref 0–0.7)
EOSINOPHIL NFR BLD AUTO: 3.9 % (ref 0.3–6.2)
ERYTHROCYTE [DISTWIDTH] IN BLOOD BY AUTOMATED COUNT: 13.5 % (ref 11.7–13)
GLOBULIN SER CALC-MCNC: 2.2 GM/DL
GLUCOSE SERPL-MCNC: 80 MG/DL (ref 65–99)
HCT VFR BLD AUTO: 39 % (ref 35.6–45.5)
HDLC SERPL-MCNC: 56 MG/DL (ref 40–60)
HGB BLD-MCNC: 12.8 G/DL (ref 11.9–15.5)
IMM GRANULOCYTES # BLD AUTO: 0 10*3/MM3 (ref 0–0.03)
IMM GRANULOCYTES NFR BLD AUTO: 0 % (ref 0–0.5)
LDLC SERPL CALC-MCNC: 106 MG/DL (ref 0–100)
LDLC/HDLC SERPL: 1.9 {RATIO}
LYMPHOCYTES # BLD AUTO: 1.69 10*3/MM3 (ref 0.9–4.8)
LYMPHOCYTES NFR BLD AUTO: 31.1 % (ref 19.6–45.3)
MCH RBC QN AUTO: 30.1 PG (ref 26.9–32)
MCHC RBC AUTO-ENTMCNC: 32.8 G/DL (ref 32.4–36.3)
MCV RBC AUTO: 91.8 FL (ref 80.5–98.2)
MONOCYTES # BLD AUTO: 0.46 10*3/MM3 (ref 0.2–1.2)
MONOCYTES NFR BLD AUTO: 8.5 % (ref 5–12)
NEUTROPHILS # BLD AUTO: 3.05 10*3/MM3 (ref 1.9–8.1)
NEUTROPHILS NFR BLD AUTO: 56.1 % (ref 42.7–76)
PLATELET # BLD AUTO: 276 10*3/MM3 (ref 140–500)
POTASSIUM SERPL-SCNC: 4.7 MMOL/L (ref 3.5–5.2)
PROT SERPL-MCNC: 6.5 G/DL (ref 6–8.5)
RBC # BLD AUTO: 4.25 10*6/MM3 (ref 3.9–5.2)
SODIUM SERPL-SCNC: 142 MMOL/L (ref 136–145)
T3FREE SERPL-MCNC: 3.2 PG/ML (ref 2–4.4)
T4 FREE SERPL-MCNC: 1.19 NG/DL (ref 0.93–1.7)
TRIGL SERPL-MCNC: 89 MG/DL (ref 0–150)
TSH SERPL DL<=0.005 MIU/L-ACNC: 2.65 MIU/ML (ref 0.27–4.2)
VLDLC SERPL CALC-MCNC: 17.8 MG/DL (ref 5–40)
WBC # BLD AUTO: 5.43 10*3/MM3 (ref 4.5–10.7)

## 2019-02-07 ENCOUNTER — HOSPITAL ENCOUNTER (OUTPATIENT)
Dept: GENERAL RADIOLOGY | Facility: HOSPITAL | Age: 49
Discharge: HOME | End: 2019-02-07
Admitting: INTERNAL MEDICINE

## 2019-02-07 ENCOUNTER — OFFICE VISIT (OUTPATIENT)
Dept: FAMILY MEDICINE CLINIC | Facility: CLINIC | Age: 49
End: 2019-02-07

## 2019-02-07 VITALS
OXYGEN SATURATION: 97 % | HEART RATE: 72 BPM | HEIGHT: 69 IN | WEIGHT: 222 LBS | RESPIRATION RATE: 16 BRPM | BODY MASS INDEX: 32.88 KG/M2 | TEMPERATURE: 98.4 F | DIASTOLIC BLOOD PRESSURE: 76 MMHG | SYSTOLIC BLOOD PRESSURE: 110 MMHG

## 2019-02-07 DIAGNOSIS — K21.9 GASTROESOPHAGEAL REFLUX DISEASE, ESOPHAGITIS PRESENCE NOT SPECIFIED: ICD-10-CM

## 2019-02-07 DIAGNOSIS — Z00.00 WELL ADULT EXAM: ICD-10-CM

## 2019-02-07 DIAGNOSIS — M54.50 ACUTE BILATERAL LOW BACK PAIN WITHOUT SCIATICA: Primary | ICD-10-CM

## 2019-02-07 DIAGNOSIS — G47.00 INSOMNIA, UNSPECIFIED TYPE: ICD-10-CM

## 2019-02-07 DIAGNOSIS — M79.7 FIBROMYALGIA: ICD-10-CM

## 2019-02-07 PROCEDURE — 99396 PREV VISIT EST AGE 40-64: CPT | Performed by: INTERNAL MEDICINE

## 2019-02-07 PROCEDURE — 72100 X-RAY EXAM L-S SPINE 2/3 VWS: CPT

## 2019-02-07 RX ORDER — BUSPIRONE HYDROCHLORIDE 5 MG/1
5 TABLET ORAL 2 TIMES DAILY
Qty: 180 TABLET | Refills: 1 | Status: SHIPPED | OUTPATIENT
Start: 2019-02-07 | End: 2019-07-26 | Stop reason: SDUPTHER

## 2019-02-07 RX ORDER — NAPROXEN 500 MG/1
500 TABLET ORAL 2 TIMES DAILY WITH MEALS
Qty: 180 TABLET | Refills: 1 | Status: SHIPPED | OUTPATIENT
Start: 2019-02-07 | End: 2019-07-26 | Stop reason: SDUPTHER

## 2019-02-07 RX ORDER — LIDOCAINE 50 MG/G
1 PATCH TOPICAL EVERY 24 HOURS
Qty: 30 PATCH | Refills: 6 | Status: SHIPPED | OUTPATIENT
Start: 2019-02-07 | End: 2020-02-12 | Stop reason: SDUPTHER

## 2019-02-07 NOTE — PROGRESS NOTES
Subjective   Eugenie Rapp is a 48 y.o. female who comes in today for   Chief Complaint   Patient presents with   • Annual Exam     CPE   • Med Refill     needs all rxs refilled please   .    History of Present Illness   Here for CPE without pap or pelvic.  Hasn't seen gyn 3-4 years.  Last cycle was 2/10/18 and having a lot of hot flashes.  Some night sweats but not too bad.  Just back from mission trip in [facility].  Is concerned about gaining some weight recently.  Has some hip pains that have been there after she moved into her new house 7-4-18.  Bilateral hip pain laterally that is worse when she first stands up and then improves as she gets moving.  Has fibromyalgia and has lot os myalgias.  Takes trazodone and that helps her sleep 7-8 hours.  Not exercising.  Snores at night.  Fatigue is manageable.  Not falling asleep while sitting in chair or at stop light.  Not sleepy  Body feels tired.    The following portions of the patient's history were reviewed and updated as appropriate: allergies, current medications, past family history, past medical history, past social history, past surgical history and problem list.    Review of Systems   Constitutional: Positive for fatigue and unexpected weight change.   Genitourinary:        No cycles in almost a year   Psychiatric/Behavioral: Negative for sleep disturbance.       Vitals:    02/07/19 0833   BP: 110/76   Pulse: 72   Resp: 16   Temp: 98.4 °F (36.9 °C)   SpO2: 97%       Objective   Physical Exam   Constitutional: She is oriented to person, place, and time. She appears well-developed and well-nourished.   HENT:   Head: Normocephalic and atraumatic.   Right Ear: External ear normal.   Left Ear: External ear normal.   Mouth/Throat: Oropharynx is clear and moist.   Eyes: Conjunctivae and EOM are normal. Pupils are equal, round, and reactive to light.   Neck: Neck supple. No thyromegaly present.   Cardiovascular: Normal rate, regular rhythm, normal heart sounds and  intact distal pulses.   Pulmonary/Chest: Effort normal and breath sounds normal.   Abdominal: Soft. Bowel sounds are normal. She exhibits no distension and no mass. There is no tenderness.   Musculoskeletal: Normal range of motion. She exhibits no edema, tenderness or deformity.   Lymphadenopathy:     She has no cervical adenopathy.   Neurological: She is alert and oriented to person, place, and time. She has normal reflexes. She displays normal reflexes.   Skin: Skin is warm.   Psychiatric: She has a normal mood and affect. Her behavior is normal. Judgment and thought content normal.   Nursing note and vitals reviewed.        Current Outpatient Medications:   •  albuterol (PROVENTIL HFA;VENTOLIN HFA) 108 (90 Base) MCG/ACT inhaler, Inhale 2 puffs Every 4 (Four) Hours As Needed for Wheezing., Disp: , Rfl:   •  budesonide-formoterol (SYMBICORT) 160-4.5 MCG/ACT inhaler, Inhale 2 puffs 2 (Two) Times a Day., Disp: 10.2 g, Rfl: 6  •  buPROPion XL (WELLBUTRIN XL) 300 MG 24 hr tablet, TAKE ONE TABLET BY MOUTH EVERY MORNING   **MUST CALL MD FOR APPOINTMENT, Disp: 90 tablet, Rfl: 0  •  busPIRone (BUSPAR) 5 MG tablet, Take 1 tablet by mouth 2 (Two) Times a Day., Disp: 180 tablet, Rfl: 1  •  cetirizine (ZyrTEC) 10 MG tablet, Take 10 mg by mouth Every Night., Disp: , Rfl:   •  Cholecalciferol (VITAMIN D-3 PO), Take 4,000 Units by mouth Daily., Disp: , Rfl:   •  eletriptan (RELPAX) 20 MG tablet, Take 20 mg by mouth 1 (One) Time As Needed for Migraine. may repeat in 2 hours if necessary, Disp: , Rfl:   •  gabapentin (NEURONTIN) 600 MG tablet, TAKE ONE TABLET BY MOUTH TWICE A DAY, Disp: 180 tablet, Rfl: 0  •  lidocaine (LIDODERM) 5 %, Place 1 patch on the skin as directed by provider Daily. Remove & Discard patch within 12 hours or as directed by MD, Disp: 30 patch, Rfl: 6  •  naproxen (NAPROSYN) 500 MG tablet, Take 1 tablet by mouth 2 (Two) Times a Day With Meals., Disp: 180 tablet, Rfl: 1  •  ondansetron (ZOFRAN) 8 MG tablet,  Take 8 mg by mouth Every 8 (Eight) Hours As Needed for Nausea or Vomiting., Disp: , Rfl:   •  pantoprazole (PROTONIX) 40 MG EC tablet, TAKE ONE TABLET BY MOUTH DAILY, Disp: 90 tablet, Rfl: 0  •  traZODone (DESYREL) 50 MG tablet, TAKE ONE TABLET BY MOUTH ONCE NIGHTLY AS NEEDED FOR SLEEP, Disp: 90 tablet, Rfl: 0    Assessment/Plan   Eugenie was seen today for annual exam and med refill.    Diagnoses and all orders for this visit:    Acute bilateral low back pain without sciatica  -     XR Spine Lumbar 2 or 3 View    Well adult exam    Insomnia, unspecified type    Gastroesophageal reflux disease, esophagitis presence not specified    Fibromyalgia    Other orders  -     busPIRone (BUSPAR) 5 MG tablet; Take 1 tablet by mouth 2 (Two) Times a Day.  -     naproxen (NAPROSYN) 500 MG tablet; Take 1 tablet by mouth 2 (Two) Times a Day With Meals.  -     lidocaine (LIDODERM) 5 %; Place 1 patch on the skin as directed by provider Daily. Remove & Discard patch within 12 hours or as directed by MD        Some of her fatigue may be related to medications.  Currently the gabapentin 600 mg twice a day is doing a pretty good job of controlling her fibromyalgia pain.  However her bilateral iliac crest are somewhat sensitive when she first stands up.  She is concerned that she may have degenerative changes in her spine.  She does have arthritis in her knee.  I've ordered an x-ray of her back and if this looks okay we'll go ahead and proceed to physical therapy since she has done well with this in the past.  It could be somewhat deconditioned as she is no longer exercising or doing her stretches.  I'm concerned about BuSpar making her somewhat tired and so we have decreased the dose from 10 mg twice a day to 5 mg twice a day.  I've refilled her Naprosyn as this does help control her pain and I refilled her lidocaine patches which worked very well for her she sets.  I've encouraged her to see her gynecologist as she is past due on her Pap  smear and mammogram.  I've given her the name of 2 gynecologist here in our building that are taking new patients and asked her to stop either office as she leaves today to make an appointment.  She continues to take the Protonix for her acid reflux and that is working well for her.  The trazodone does help her sleep and I have advised her that she may want to take it around 7 PM in order to follow sleep at the normal time and she states she is a night owl.  I did look at a bug bite on her right breast that appears to be healing very well.  There is no drainage or redness.  She will let me know if this does not fully resolve in another week to 2 weeks.           I have asked for the patient to return to clinic in 6month(s).

## 2019-03-25 RX ORDER — BUPROPION HYDROCHLORIDE 300 MG/1
TABLET ORAL
Qty: 90 TABLET | Refills: 1 | Status: SHIPPED | OUTPATIENT
Start: 2019-03-25 | End: 2019-09-23 | Stop reason: SDUPTHER

## 2019-03-25 RX ORDER — GABAPENTIN 600 MG/1
TABLET ORAL
Qty: 180 TABLET | Refills: 1 | Status: SHIPPED | OUTPATIENT
Start: 2019-03-25 | End: 2019-09-23 | Stop reason: SDUPTHER

## 2019-03-25 RX ORDER — TRAZODONE HYDROCHLORIDE 50 MG/1
TABLET ORAL
Qty: 90 TABLET | Refills: 1 | Status: SHIPPED | OUTPATIENT
Start: 2019-03-25 | End: 2019-09-23 | Stop reason: SDUPTHER

## 2019-03-28 ENCOUNTER — TRANSCRIBE ORDERS (OUTPATIENT)
Dept: OBSTETRICS AND GYNECOLOGY | Facility: CLINIC | Age: 49
End: 2019-03-28

## 2019-03-28 ENCOUNTER — OFFICE VISIT (OUTPATIENT)
Dept: OBSTETRICS AND GYNECOLOGY | Facility: CLINIC | Age: 49
End: 2019-03-28

## 2019-03-28 VITALS
HEIGHT: 69 IN | BODY MASS INDEX: 33.18 KG/M2 | DIASTOLIC BLOOD PRESSURE: 72 MMHG | SYSTOLIC BLOOD PRESSURE: 114 MMHG | WEIGHT: 224 LBS

## 2019-03-28 DIAGNOSIS — Z11.51 SPECIAL SCREENING EXAMINATION FOR HUMAN PAPILLOMAVIRUS (HPV): ICD-10-CM

## 2019-03-28 DIAGNOSIS — Z01.419 ENCOUNTER FOR GYNECOLOGICAL EXAMINATION WITHOUT ABNORMAL FINDING: ICD-10-CM

## 2019-03-28 DIAGNOSIS — Z01.419 PAP SMEAR, LOW-RISK: Primary | ICD-10-CM

## 2019-03-28 DIAGNOSIS — Z13.9 SCREENING FOR CONDITION: ICD-10-CM

## 2019-03-28 DIAGNOSIS — Z12.39 SCREENING BREAST EXAMINATION: Primary | ICD-10-CM

## 2019-03-28 LAB
BILIRUB BLD-MCNC: NEGATIVE MG/DL
CLARITY, POC: CLEAR
COLOR UR: YELLOW
GLUCOSE UR STRIP-MCNC: NEGATIVE MG/DL
KETONES UR QL: NEGATIVE
LEUKOCYTE EST, POC: NEGATIVE
NITRITE UR-MCNC: NEGATIVE MG/ML
PH UR: 5 [PH] (ref 5–8)
PROT UR STRIP-MCNC: NEGATIVE MG/DL
RBC # UR STRIP: NEGATIVE /UL
SP GR UR: 1.02 (ref 1–1.03)
UROBILINOGEN UR QL: NORMAL

## 2019-03-28 PROCEDURE — 99386 PREV VISIT NEW AGE 40-64: CPT | Performed by: OBSTETRICS & GYNECOLOGY

## 2019-03-28 PROCEDURE — 81002 URINALYSIS NONAUTO W/O SCOPE: CPT | Performed by: OBSTETRICS & GYNECOLOGY

## 2019-03-28 NOTE — PROGRESS NOTES
"GYN Annual Exam     CC- Here for annual exam.     Eugenie Rapp is a 48 y.o. female new patient who presents for annual well woman exam. She has not had a cycles for > 1 year as of 2019. She has ah/o of a \"micorstroke\" and had a blood clot in her retina and so can never take HRT. No  VB.     OB History      Para Term  AB Living    4 3 3   1 3    SAB TAB Ectopic Molar Multiple Live Births    1                  Obstetric Comments    3   1 SAB  No D&C          Menarche: 13  Current contraception: post menopausal status and vasectomy  History of abnormal Pap smear: no  History of abnormal mammogram: no  Family history of uterine, colon or ovarian cancer: no  Family history of breast cancer: no  H/o STDs: none  Gardasil: none   Last pap> 4 years ago  H/O \"ministroke\", can't take HRT    Health Maintenance   Topic Date Due   • TDAP/TD VACCINES (1 - Tdap) 10/15/1988   • PAP SMEAR  2016   • INFLUENZA VACCINE  2018   • LIPID PANEL  2020   • ANNUAL PHYSICAL  2020       Past Medical History:   Diagnosis Date   • Anxiety    • Asthma    • Depression    • Fibromyalgia    • GERD (gastroesophageal reflux disease)    • GERD (gastroesophageal reflux disease)    • Hemorrhoid    • Hyperlipidemia    • Left knee pain    • Low back pain    • Migraines     with aura   • Other abnormal clinical finding     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/4   • Partial blindness     LEFT EYE <1/4 VISUAL FIELD AFFECTED FROM MICROSTROKE TO RETINA   • Plantar fasciitis     BILAT FEET   • PONV (postoperative nausea and vomiting)     VOMITS ALL DAY   • Retinal thrombosis    • Sciatica of left side    • Seasonal allergies    • Tennis elbow     LEFT   • Vitamin D deficiency        Past Surgical History:   Procedure Laterality Date   • CHOLECYSTECTOMY     • LASIK     • POSTERIOR VAGINAL REPAIR     • TENNIS ELBOW RELEASE Left 10/3/2017    Procedure: LEFT ELBOW OPEN EXTENSOR TENDON DEBRIDEMENT AND " REPAIR;  Surgeon: TASH Aly MD;  Location: [facility] OR [facility] [place];  Service:    • ULNAR NERVE DECOMPRESSION Left    • WISDOM TOOTH EXTRACTION           Current Outpatient Medications:   •  albuterol (PROVENTIL HFA;VENTOLIN HFA) 108 (90 Base) MCG/ACT inhaler, Inhale 2 puffs Every 4 (Four) Hours As Needed for Wheezing., Disp: , Rfl:   •  budesonide-formoterol (SYMBICORT) 160-4.5 MCG/ACT inhaler, Inhale 2 puffs 2 (Two) Times a Day., Disp: 10.2 g, Rfl: 6  •  buPROPion XL (WELLBUTRIN XL) 300 MG 24 hr tablet, TAKE ONE TABLET BY MOUTH EVERY MORNING **MUST CALL MD FOR APPOINTMENT**, Disp: 90 tablet, Rfl: 1  •  busPIRone (BUSPAR) 5 MG tablet, Take 1 tablet by mouth 2 (Two) Times a Day., Disp: 180 tablet, Rfl: 1  •  cetirizine (ZyrTEC) 10 MG tablet, Take 10 mg by mouth Every Night., Disp: , Rfl:   •  Cholecalciferol (VITAMIN D-3 PO), Take 4,000 Units by mouth Daily., Disp: , Rfl:   •  eletriptan (RELPAX) 20 MG tablet, Take 20 mg by mouth 1 (One) Time As Needed for Migraine. may repeat in 2 hours if necessary, Disp: , Rfl:   •  gabapentin (NEURONTIN) 600 MG tablet, TAKE ONE TABLET BY MOUTH TWICE A DAY, Disp: 180 tablet, Rfl: 1  •  lidocaine (LIDODERM) 5 %, Place 1 patch on the skin as directed by provider Daily. Remove & Discard patch within 12 hours or as directed by MD, Disp: 30 patch, Rfl: 6  •  naproxen (NAPROSYN) 500 MG tablet, Take 1 tablet by mouth 2 (Two) Times a Day With Meals., Disp: 180 tablet, Rfl: 1  •  ondansetron (ZOFRAN) 8 MG tablet, Take 8 mg by mouth Every 8 (Eight) Hours As Needed for Nausea or Vomiting., Disp: , Rfl:   •  pantoprazole (PROTONIX) 40 MG EC tablet, TAKE ONE TABLET BY MOUTH DAILY, Disp: 90 tablet, Rfl: 0  •  traZODone (DESYREL) 50 MG tablet, TAKE ONE TABLET BY MOUTH ONCE NIGHTLY AS NEEDED FOR SLEEP, Disp: 90 tablet, Rfl: 1    Allergies   Allergen Reactions   • Promethazine Other (See Comments)     EPS SYMPTOMS   • Codeine Rash   • Penicillins Rash   • Sulfa Antibiotics Rash       Social History  "    Tobacco Use   • Smoking status: Never Smoker   • Smokeless tobacco: Never Used   Substance Use Topics   • Alcohol use: No     Comment: RARE MONTHLY   • Drug use: No       Family History   Problem Relation Age of Onset   • Leukemia Brother    • Malig Hyperthermia Neg Hx    • Breast cancer Neg Hx    • Ovarian cancer Neg Hx    • Colon cancer Neg Hx    • Deep vein thrombosis Neg Hx    • Pulmonary embolism Neg Hx        Review of Systems   Constitutional: Negative for appetite change, fatigue, fever and unexpected weight change.   Respiratory: Negative for cough and shortness of breath.    Cardiovascular: Negative for chest pain and palpitations.   Gastrointestinal: Negative for abdominal distention, abdominal pain, constipation, diarrhea and nausea.   Endocrine: Negative for cold intolerance and heat intolerance.   Genitourinary: Negative for dyspareunia, dysuria, menstrual problem, pelvic pain and vaginal discharge.   Skin: Positive for wound (has spider bite on R breast). Negative for color change and rash.   Neurological: Negative for headaches.   Psychiatric/Behavioral: Negative for dysphoric mood. The patient is not nervous/anxious.        /72   Ht 175.3 cm (69\")   Wt 102 kg (224 lb)   BMI 33.08 kg/m²     Physical Exam   Constitutional: She is oriented to person, place, and time. She appears well-developed and well-nourished.   HENT:   Head: Normocephalic and atraumatic.   Eyes: Conjunctivae are normal. No scleral icterus.   Neck: Neck supple. No thyromegaly present.   Cardiovascular: Normal rate and regular rhythm.   Pulmonary/Chest: Effort normal and breath sounds normal. Right breast exhibits skin change (spider bite from 1/2019, healing). Right breast exhibits no inverted nipple, no mass, no nipple discharge and no tenderness. Left breast exhibits no inverted nipple, no mass, no nipple discharge, no skin change and no tenderness.       Abdominal: Soft. Bowel sounds are normal. She exhibits no " distension and no mass. There is no tenderness. There is no rebound and no guarding. No hernia.   Genitourinary: Uterus normal. Pelvic exam was performed with patient supine. There is no rash, tenderness or lesion on the right labia. There is no rash, tenderness or lesion on the left labia. Cervix exhibits no motion tenderness, no discharge and no friability. Right adnexum displays no mass, no tenderness and no fullness. Left adnexum displays no mass, no tenderness and no fullness. No erythema, tenderness or bleeding in the vagina. No foreign body in the vagina. No signs of injury around the vagina. No vaginal discharge found.   Neurological: She is alert and oriented to person, place, and time.   Skin: Skin is warm and dry.   Psychiatric: She has a normal mood and affect. Her behavior is normal. Judgment and thought content normal.   Nursing note and vitals reviewed.         Assessment/Plan    1) GYN HM: check pap/HPV   SBE demonstrated and encouraged.  2) STD screening: declines Condoms encouraged.  3) Contraception: vas,    4) Family Planning: no plans, encourage folic acid daily  5) Diet and Exercise discussed  6) Smoking Status: non smoker  7) Social:   8) MMG- schedule  9)Follow up prn or 1 year  10) Refer for C scope> 50.   11) H/O ministroke, retinal artery thrombosis- can't use HRT.     Eugenie was seen today for gynecologic exam.    Diagnoses and all orders for this visit:    Pap smear, low-risk  -     Pap IG, HPV-hr    Screening for condition  -     POC Urinalysis Dipstick    Special screening examination for human papillomavirus (HPV)  -     Pap IG, HPV-hr          Becka Barfield MD  3/28/19  4:42 PM

## 2019-03-31 PROBLEM — K21.9 GASTROESOPHAGEAL REFLUX DISEASE: Status: RESOLVED | Noted: 2017-10-06 | Resolved: 2019-03-31

## 2019-03-31 PROBLEM — M79.7 FIBROMYALGIA: Status: RESOLVED | Noted: 2018-01-31 | Resolved: 2019-03-31

## 2019-04-01 LAB
CYTOLOGIST CVX/VAG CYTO: NORMAL
CYTOLOGY CVX/VAG DOC THIN PREP: NORMAL
DX ICD CODE: NORMAL
HIV 1 & 2 AB SER-IMP: NORMAL
HPV I/H RISK 1 DNA CVX QL PROBE+SIG AMP: NEGATIVE
OTHER STN SPEC: NORMAL
PATH REPORT.FINAL DX SPEC: NORMAL
STAT OF ADQ CVX/VAG CYTO-IMP: NORMAL

## 2019-04-05 ENCOUNTER — HOSPITAL ENCOUNTER (OUTPATIENT)
Dept: MAMMOGRAPHY | Facility: HOSPITAL | Age: 49
Discharge: HOME | End: 2019-04-05
Admitting: OBSTETRICS & GYNECOLOGY

## 2019-04-05 DIAGNOSIS — Z12.39 SCREENING BREAST EXAMINATION: ICD-10-CM

## 2019-04-05 PROCEDURE — 77067 SCR MAMMO BI INCL CAD: CPT

## 2019-04-05 PROCEDURE — 77063 BREAST TOMOSYNTHESIS BI: CPT

## 2019-05-21 RX ORDER — PANTOPRAZOLE SODIUM 40 MG/1
TABLET, DELAYED RELEASE ORAL
Qty: 90 TABLET | Refills: 0 | Status: SHIPPED | OUTPATIENT
Start: 2019-05-21 | End: 2019-08-21 | Stop reason: SDUPTHER

## 2019-07-29 RX ORDER — NAPROXEN 500 MG/1
TABLET ORAL
Qty: 180 TABLET | Refills: 0 | Status: SHIPPED | OUTPATIENT
Start: 2019-07-29 | End: 2019-12-14 | Stop reason: SDUPTHER

## 2019-07-29 RX ORDER — BUSPIRONE HYDROCHLORIDE 5 MG/1
TABLET ORAL
Qty: 180 TABLET | Refills: 0 | Status: SHIPPED | OUTPATIENT
Start: 2019-07-29 | End: 2019-12-14 | Stop reason: SDUPTHER

## 2019-08-21 RX ORDER — PANTOPRAZOLE SODIUM 40 MG/1
TABLET, DELAYED RELEASE ORAL
Qty: 90 TABLET | Refills: 1 | Status: SHIPPED | OUTPATIENT
Start: 2019-08-21 | End: 2020-02-12 | Stop reason: SDUPTHER

## 2019-09-25 RX ORDER — BUPROPION HYDROCHLORIDE 300 MG/1
TABLET ORAL
Qty: 90 TABLET | Refills: 0 | Status: SHIPPED | OUTPATIENT
Start: 2019-09-25 | End: 2019-12-14 | Stop reason: SDUPTHER

## 2019-09-25 RX ORDER — TRAZODONE HYDROCHLORIDE 50 MG/1
TABLET ORAL
Qty: 90 TABLET | Refills: 0 | Status: SHIPPED | OUTPATIENT
Start: 2019-09-25 | End: 2019-12-14 | Stop reason: SDUPTHER

## 2019-09-27 RX ORDER — GABAPENTIN 600 MG/1
TABLET ORAL
Qty: 180 TABLET | Refills: 0 | Status: SHIPPED | OUTPATIENT
Start: 2019-09-27 | End: 2020-01-06

## 2019-10-28 ENCOUNTER — APPOINTMENT (OUTPATIENT)
Dept: GENERAL RADIOLOGY | Facility: HOSPITAL | Age: 49
End: 2019-10-28

## 2019-10-28 PROCEDURE — 73610 X-RAY EXAM OF ANKLE: CPT | Performed by: GENERAL PRACTICE

## 2019-10-28 PROCEDURE — 73630 X-RAY EXAM OF FOOT: CPT | Performed by: GENERAL PRACTICE

## 2019-12-16 RX ORDER — NAPROXEN 500 MG/1
TABLET ORAL
Qty: 180 TABLET | Refills: 0 | Status: SHIPPED | OUTPATIENT
Start: 2019-12-16 | End: 2020-03-25

## 2019-12-16 RX ORDER — BUPROPION HYDROCHLORIDE 300 MG/1
TABLET ORAL
Qty: 90 TABLET | Refills: 0 | Status: SHIPPED | OUTPATIENT
Start: 2019-12-16 | End: 2020-02-12 | Stop reason: SDUPTHER

## 2019-12-16 RX ORDER — TRAZODONE HYDROCHLORIDE 50 MG/1
TABLET ORAL
Qty: 90 TABLET | Refills: 0 | Status: SHIPPED | OUTPATIENT
Start: 2019-12-16 | End: 2020-02-12 | Stop reason: SDUPTHER

## 2019-12-16 RX ORDER — BUSPIRONE HYDROCHLORIDE 5 MG/1
TABLET ORAL
Qty: 180 TABLET | Refills: 0 | Status: SHIPPED | OUTPATIENT
Start: 2019-12-16 | End: 2020-02-12 | Stop reason: SDUPTHER

## 2020-01-05 DIAGNOSIS — M79.7 FIBROMYALGIA: Primary | ICD-10-CM

## 2020-01-06 RX ORDER — GABAPENTIN 600 MG/1
TABLET ORAL
Qty: 180 TABLET | Refills: 0 | Status: SHIPPED | OUTPATIENT
Start: 2020-01-06 | End: 2020-03-25

## 2020-01-30 DIAGNOSIS — Z00.00 WELL ADULT EXAM: Primary | ICD-10-CM

## 2020-02-03 LAB
25(OH)D3+25(OH)D2 SERPL-MCNC: 63.7 NG/ML (ref 30–100)
ALBUMIN SERPL-MCNC: 4.4 G/DL (ref 3.5–5.2)
ALBUMIN/GLOB SERPL: 2.3 G/DL
ALP SERPL-CCNC: 78 U/L (ref 39–117)
ALT SERPL-CCNC: 14 U/L (ref 1–33)
APPEARANCE UR: CLEAR
AST SERPL-CCNC: 15 U/L (ref 1–32)
BACTERIA #/AREA URNS HPF: ABNORMAL /HPF
BACTERIA UR CULT: ABNORMAL
BACTERIA UR CULT: ABNORMAL
BASOPHILS # BLD AUTO: 0.04 10*3/MM3 (ref 0–0.2)
BASOPHILS NFR BLD AUTO: 0.7 % (ref 0–1.5)
BILIRUB SERPL-MCNC: 0.5 MG/DL (ref 0.2–1.2)
BILIRUB UR QL STRIP: NEGATIVE
BUN SERPL-MCNC: 23 MG/DL (ref 6–20)
BUN/CREAT SERPL: 22.5 (ref 7–25)
CALCIUM SERPL-MCNC: 9.3 MG/DL (ref 8.6–10.5)
CHLORIDE SERPL-SCNC: 105 MMOL/L (ref 98–107)
CHOLEST SERPL-MCNC: 207 MG/DL (ref 0–200)
CO2 SERPL-SCNC: 28 MMOL/L (ref 22–29)
COLOR UR: YELLOW
CREAT SERPL-MCNC: 1.02 MG/DL (ref 0.57–1)
CRYSTALS URNS MICRO: ABNORMAL
EOSINOPHIL # BLD AUTO: 0.16 10*3/MM3 (ref 0–0.4)
EOSINOPHIL NFR BLD AUTO: 2.7 % (ref 0.3–6.2)
EPI CELLS #/AREA URNS HPF: ABNORMAL /HPF (ref 0–10)
ERYTHROCYTE [DISTWIDTH] IN BLOOD BY AUTOMATED COUNT: 12.8 % (ref 12.3–15.4)
GLOBULIN SER CALC-MCNC: 1.9 GM/DL
GLUCOSE SERPL-MCNC: 82 MG/DL (ref 65–99)
GLUCOSE UR QL: NEGATIVE
HCT VFR BLD AUTO: 40.1 % (ref 34–46.6)
HDLC SERPL-MCNC: 64 MG/DL (ref 40–60)
HGB BLD-MCNC: 13.7 G/DL (ref 12–15.9)
HGB UR QL STRIP: NEGATIVE
IMM GRANULOCYTES # BLD AUTO: 0.02 10*3/MM3 (ref 0–0.05)
IMM GRANULOCYTES NFR BLD AUTO: 0.3 % (ref 0–0.5)
KETONES UR QL STRIP: NEGATIVE
LDLC SERPL CALC-MCNC: 127 MG/DL (ref 0–100)
LDLC/HDLC SERPL: 1.99 {RATIO}
LEUKOCYTE ESTERASE UR QL STRIP: NEGATIVE
LYMPHOCYTES # BLD AUTO: 1.94 10*3/MM3 (ref 0.7–3.1)
LYMPHOCYTES NFR BLD AUTO: 33.2 % (ref 19.6–45.3)
MCH RBC QN AUTO: 30.8 PG (ref 26.6–33)
MCHC RBC AUTO-ENTMCNC: 34.2 G/DL (ref 31.5–35.7)
MCV RBC AUTO: 90.1 FL (ref 79–97)
MICRO URNS: NORMAL
MICRO URNS: NORMAL
MONOCYTES # BLD AUTO: 0.5 10*3/MM3 (ref 0.1–0.9)
MONOCYTES NFR BLD AUTO: 8.5 % (ref 5–12)
MUCOUS THREADS URNS QL MICRO: PRESENT /HPF
NEUTROPHILS # BLD AUTO: 3.19 10*3/MM3 (ref 1.7–7)
NEUTROPHILS NFR BLD AUTO: 54.6 % (ref 42.7–76)
NITRITE UR QL STRIP: NEGATIVE
NRBC BLD AUTO-RTO: 0 /100 WBC (ref 0–0.2)
OTHER ANTIBIOTIC SUSC ISLT: ABNORMAL
PH UR STRIP: 5 [PH] (ref 5–7.5)
PLATELET # BLD AUTO: 304 10*3/MM3 (ref 140–450)
POTASSIUM SERPL-SCNC: 4.7 MMOL/L (ref 3.5–5.2)
PROT SERPL-MCNC: 6.3 G/DL (ref 6–8.5)
PROT UR QL STRIP: NORMAL
RBC # BLD AUTO: 4.45 10*6/MM3 (ref 3.77–5.28)
RBC #/AREA URNS HPF: ABNORMAL /HPF (ref 0–2)
SODIUM SERPL-SCNC: 142 MMOL/L (ref 136–145)
SP GR UR: 1.03 (ref 1–1.03)
T4 FREE SERPL-MCNC: 1.3 NG/DL (ref 0.93–1.7)
TRIGL SERPL-MCNC: 78 MG/DL (ref 0–150)
TSH SERPL DL<=0.005 MIU/L-ACNC: 2.59 UIU/ML (ref 0.27–4.2)
UNIDENT CRYS URNS QL MICRO: PRESENT /LPF
URINALYSIS REFLEX: NORMAL
UROBILINOGEN UR STRIP-MCNC: 0.2 MG/DL (ref 0.2–1)
VLDLC SERPL CALC-MCNC: 15.6 MG/DL
WBC # BLD AUTO: 5.85 10*3/MM3 (ref 3.4–10.8)
WBC #/AREA URNS HPF: ABNORMAL /HPF (ref 0–5)

## 2020-02-12 ENCOUNTER — OFFICE VISIT (OUTPATIENT)
Dept: FAMILY MEDICINE CLINIC | Facility: CLINIC | Age: 50
End: 2020-02-12

## 2020-02-12 VITALS
RESPIRATION RATE: 16 BRPM | HEART RATE: 79 BPM | TEMPERATURE: 98.3 F | WEIGHT: 229.4 LBS | SYSTOLIC BLOOD PRESSURE: 100 MMHG | DIASTOLIC BLOOD PRESSURE: 80 MMHG | BODY MASS INDEX: 33.98 KG/M2 | HEIGHT: 69 IN | OXYGEN SATURATION: 99 %

## 2020-02-12 DIAGNOSIS — G43.809 OTHER MIGRAINE WITHOUT STATUS MIGRAINOSUS, NOT INTRACTABLE: Primary | ICD-10-CM

## 2020-02-12 DIAGNOSIS — G89.29 CHRONIC ARTHRALGIAS OF KNEES AND HIPS: ICD-10-CM

## 2020-02-12 DIAGNOSIS — M25.551 CHRONIC ARTHRALGIAS OF KNEES AND HIPS: ICD-10-CM

## 2020-02-12 DIAGNOSIS — M25.562 CHRONIC ARTHRALGIAS OF KNEES AND HIPS: ICD-10-CM

## 2020-02-12 DIAGNOSIS — G47.00 INSOMNIA, UNSPECIFIED TYPE: ICD-10-CM

## 2020-02-12 DIAGNOSIS — Z00.00 WELL ADULT EXAM: ICD-10-CM

## 2020-02-12 DIAGNOSIS — S93.402A SPRAIN OF LEFT ANKLE, UNSPECIFIED LIGAMENT, INITIAL ENCOUNTER: ICD-10-CM

## 2020-02-12 DIAGNOSIS — M25.552 CHRONIC ARTHRALGIAS OF KNEES AND HIPS: ICD-10-CM

## 2020-02-12 DIAGNOSIS — K21.9 GASTROESOPHAGEAL REFLUX DISEASE, ESOPHAGITIS PRESENCE NOT SPECIFIED: ICD-10-CM

## 2020-02-12 DIAGNOSIS — M25.561 CHRONIC ARTHRALGIAS OF KNEES AND HIPS: ICD-10-CM

## 2020-02-12 DIAGNOSIS — Z12.11 COLON CANCER SCREENING: ICD-10-CM

## 2020-02-12 DIAGNOSIS — M79.7 FIBROMYALGIA: ICD-10-CM

## 2020-02-12 PROCEDURE — 99396 PREV VISIT EST AGE 40-64: CPT | Performed by: INTERNAL MEDICINE

## 2020-02-12 RX ORDER — BUSPIRONE HYDROCHLORIDE 10 MG/1
10 TABLET ORAL 2 TIMES DAILY
Qty: 180 TABLET | Refills: 4 | Status: SHIPPED | OUTPATIENT
Start: 2020-02-12 | End: 2021-02-02 | Stop reason: SDUPTHER

## 2020-02-12 RX ORDER — LIDOCAINE 50 MG/G
1 PATCH TOPICAL EVERY 24 HOURS
Qty: 30 PATCH | Refills: 6 | Status: SHIPPED | OUTPATIENT
Start: 2020-02-12 | End: 2021-07-29

## 2020-02-12 RX ORDER — BUPROPION HYDROCHLORIDE 300 MG/1
300 TABLET ORAL EVERY MORNING
Qty: 90 TABLET | Refills: 4 | Status: SHIPPED | OUTPATIENT
Start: 2020-02-12 | End: 2021-02-02 | Stop reason: SDUPTHER

## 2020-02-12 RX ORDER — TRAZODONE HYDROCHLORIDE 50 MG/1
50 TABLET ORAL NIGHTLY
Qty: 90 TABLET | Refills: 4 | Status: SHIPPED | OUTPATIENT
Start: 2020-02-12 | End: 2021-02-02 | Stop reason: SDUPTHER

## 2020-02-12 RX ORDER — PANTOPRAZOLE SODIUM 40 MG/1
40 TABLET, DELAYED RELEASE ORAL DAILY
Qty: 90 TABLET | Refills: 4 | Status: SHIPPED | OUTPATIENT
Start: 2020-02-12 | End: 2021-02-02 | Stop reason: SDUPTHER

## 2020-02-12 NOTE — PROGRESS NOTES
Subjective   Eugenie Rapp is a 49 y.o. female who comes in today for   Chief Complaint   Patient presents with   • Annual Exam     cpe    • Depression     pt refused to do depression screening    .    History of Present Illness   Here for CPE.  Feeling pretty low in general.  Sprained her ankle on left in Oct. While on vacation.  Wore a boot on left ankle and then that made the right knee swell and sore.  Saw her ortho and got steroid shot in knee in December and was told had little cartilage in knee.  She is going for 3rd gel injection on Monday.  Seeing Dr. Mansoor Aly.  Last year she walked 3 different 5K walks and since she has been injured she is less active and not exercising.  Has gained weight.  Step father  in November and that was a big loss.  She is grieving his death.  She is still working at [employer] part time.  Moved 2018..  Youngest daughter is home from college till she goes to Law School.  Feeling depressed and not involved within her Confucianism, more withdrawn.  Eating more and has gained weight.  She is takng trazodone and gabapentin and does not get sleepy until midnight or 0100.    Walks the dog after work but can't go far b/c of ankle and knee pain .  Hasn't done PT on either.  If sits down, all is lost and nothing else is going to happen.  Her youngest daughter is home from college and she has helped her get started on making dinner.  She quit going to her [employer].    The following portions of the patient's history were reviewed and updated as appropriate: allergies, current medications, past family history, past medical history, past social history, past surgical history and problem list.    Review of Systems   Constitutional: Positive for unexpected weight change.   Musculoskeletal: Positive for arthralgias, back pain and gait problem (due to ankle sprain).   Psychiatric/Behavioral: Positive for dysphoric mood and sleep disturbance.       /80   Pulse 79   Temp 98.3 °F (36.8 °C)  "(Oral)   Resp 16   Ht 175.3 cm (69\")   Wt 104 kg (229 lb 6.4 oz)   SpO2 99%   BMI 33.88 kg/m²     STEADI Fall Risk Assessment has not been completed.    PHQ-2/PHQ-9 Depression Screening 10/6/2017   Little interest or pleasure in doing things 0   Feeling down, depressed, or hopeless 0   Total Score 0       Objective   Physical Exam   Constitutional: She is oriented to person, place, and time. She appears well-developed and well-nourished.   HENT:   Head: Normocephalic and atraumatic.   Right Ear: External ear normal.   Left Ear: External ear normal.   Mouth/Throat: Oropharynx is clear and moist.   Eyes: Conjunctivae are normal.   Neck: Neck supple.   Cardiovascular: Normal rate, regular rhythm and normal heart sounds.   No bruits   Pulmonary/Chest: Effort normal and breath sounds normal. No respiratory distress. She has no wheezes. She has no rales.   Abdominal: Soft. Bowel sounds are normal. She exhibits no distension and no mass. There is no tenderness.   Lymphadenopathy:     She has no cervical adenopathy.   Neurological: She is alert and oriented to person, place, and time.   Skin: Skin is warm.   Psychiatric: She has a normal mood and affect. Her behavior is normal. Judgment and thought content normal.   Nursing note and vitals reviewed.        Current Outpatient Medications:   •  albuterol (PROVENTIL HFA;VENTOLIN HFA) 108 (90 Base) MCG/ACT inhaler, Inhale 2 puffs Every 4 (Four) Hours As Needed for Wheezing., Disp: , Rfl:   •  buPROPion XL (WELLBUTRIN XL) 300 MG 24 hr tablet, Take 1 tablet by mouth Every Morning., Disp: 90 tablet, Rfl: 4  •  busPIRone (BUSPAR) 10 MG tablet, Take 1 tablet by mouth 2 (Two) Times a Day., Disp: 180 tablet, Rfl: 4  •  cetirizine (ZyrTEC) 10 MG tablet, Take 10 mg by mouth Every Night., Disp: , Rfl:   •  Cholecalciferol (VITAMIN D-3 PO), Take 4,000 Units by mouth Daily., Disp: , Rfl:   •  gabapentin (NEURONTIN) 600 MG tablet, TAKE ONE TABLET BY MOUTH TWICE A DAY, Disp: 180 tablet, " Rfl: 0  •  lidocaine (LIDODERM) 5 %, Place 1 patch on the skin as directed by provider Daily. Remove & Discard patch within 12 hours or as directed by MD, Disp: 30 patch, Rfl: 6  •  naproxen (NAPROSYN) 500 MG tablet, TAKE ONE TABLET BY MOUTH TWICE A DAY WITH MEALS, Disp: 180 tablet, Rfl: 0  •  ondansetron (ZOFRAN) 8 MG tablet, Take 8 mg by mouth Every 8 (Eight) Hours As Needed for Nausea or Vomiting., Disp: , Rfl:   •  pantoprazole (PROTONIX) 40 MG EC tablet, Take 1 tablet by mouth Daily., Disp: 90 tablet, Rfl: 4  •  traZODone (DESYREL) 50 MG tablet, Take 1 tablet by mouth Every Night., Disp: 90 tablet, Rfl: 4    Assessment/Plan   Eugenie was seen today for annual exam and depression.    Diagnoses and all orders for this visit:    Other migraine without status migrainosus, not intractable    Gastroesophageal reflux disease, esophagitis presence not specified  -     Ambulatory Referral to Gastroenterology    Fibromyalgia    Insomnia, unspecified type    Chronic arthralgias of knees and hips  -     Ambulatory Referral to Physical Therapy Evaluate and treat    Colon cancer screening  -     Ambulatory Referral to Gastroenterology    Sprain of left ankle, unspecified ligament, initial encounter  -     Ambulatory Referral to Physical Therapy Evaluate and treat    Well adult exam    Other orders  -     busPIRone (BUSPAR) 10 MG tablet; Take 1 tablet by mouth 2 (Two) Times a Day.  -     buPROPion XL (WELLBUTRIN XL) 300 MG 24 hr tablet; Take 1 tablet by mouth Every Morning.  -     lidocaine (LIDODERM) 5 %; Place 1 patch on the skin as directed by provider Daily. Remove & Discard patch within 12 hours or as directed by MD  -     pantoprazole (PROTONIX) 40 MG EC tablet; Take 1 tablet by mouth Daily.  -     traZODone (DESYREL) 50 MG tablet; Take 1 tablet by mouth Every Night.      Stop using technology at night to help her with insomnia.  Continue gabapentin/trazodone and start melatonin  Start counseling for grief and  depression  Start PT for left ankle and right knee pain.  Needs rehab for better functionality and pain control  Recommend counseling Jose Angel Herrera.    Increase buspar to 10 mg bid  Continue wellbutrin  mg qd  Refer to GI Dr. Nagel for further evaluation for GERD and need for CRC screening. Needs EGD as well as CN.  Continue protonix.  Using prn tums and pepcid qhs  Preventive counseling performed  Would like to see her reduce dosage of naprosyn to 500 mg qam and none in the evening.  Increase hydration.    Labs reviewed           I have asked for the patient to return to clinic in 12month(s).

## 2020-03-24 DIAGNOSIS — M79.7 FIBROMYALGIA: ICD-10-CM

## 2020-03-25 RX ORDER — NAPROXEN 500 MG/1
TABLET ORAL
Qty: 180 TABLET | Refills: 0 | Status: SHIPPED | OUTPATIENT
Start: 2020-03-25 | End: 2020-09-21

## 2020-03-25 RX ORDER — GABAPENTIN 600 MG/1
TABLET ORAL
Qty: 180 TABLET | Refills: 0 | Status: SHIPPED | OUTPATIENT
Start: 2020-03-25 | End: 2020-06-26

## 2020-05-13 ENCOUNTER — TRANSCRIBE ORDERS (OUTPATIENT)
Dept: CARDIOLOGY | Facility: HOSPITAL | Age: 50
End: 2020-05-13

## 2020-05-13 ENCOUNTER — HOSPITAL ENCOUNTER (OUTPATIENT)
Dept: CARDIOLOGY | Facility: HOSPITAL | Age: 50
Discharge: HOME | End: 2020-05-13
Admitting: ORTHOPAEDIC SURGERY

## 2020-05-13 ENCOUNTER — APPOINTMENT (OUTPATIENT)
Dept: LAB | Facility: HOSPITAL | Age: 50
End: 2020-05-13

## 2020-05-13 ENCOUNTER — LAB (OUTPATIENT)
Dept: LAB | Facility: HOSPITAL | Age: 50
End: 2020-05-13

## 2020-05-13 ENCOUNTER — TRANSCRIBE ORDERS (OUTPATIENT)
Dept: ADMINISTRATIVE | Facility: HOSPITAL | Age: 50
End: 2020-05-13

## 2020-05-13 DIAGNOSIS — Z01.811 PRE-OP CHEST EXAM: Primary | ICD-10-CM

## 2020-05-13 DIAGNOSIS — M25.561 RIGHT KNEE PAIN, UNSPECIFIED CHRONICITY: ICD-10-CM

## 2020-05-13 DIAGNOSIS — M25.561 RIGHT KNEE PAIN, UNSPECIFIED CHRONICITY: Primary | ICD-10-CM

## 2020-05-13 LAB
ALBUMIN SERPL-MCNC: 4.5 G/DL (ref 3.5–5.2)
ALBUMIN/GLOB SERPL: 1.4 G/DL
ALP SERPL-CCNC: 79 U/L (ref 39–117)
ALT SERPL W P-5'-P-CCNC: 21 U/L (ref 1–33)
ANION GAP SERPL CALCULATED.3IONS-SCNC: 12.5 MMOL/L (ref 5–15)
AST SERPL-CCNC: 22 U/L (ref 1–32)
BASOPHILS # BLD AUTO: 0.05 10*3/MM3 (ref 0–0.2)
BASOPHILS NFR BLD AUTO: 0.8 % (ref 0–1.5)
BILIRUB SERPL-MCNC: 0.7 MG/DL (ref 0.2–1.2)
BILIRUB UR QL STRIP: NEGATIVE
BUN BLD-MCNC: 15 MG/DL (ref 6–20)
BUN/CREAT SERPL: 13.8 (ref 7–25)
CALCIUM SPEC-SCNC: 9.8 MG/DL (ref 8.6–10.5)
CHLORIDE SERPL-SCNC: 101 MMOL/L (ref 98–107)
CLARITY UR: CLEAR
CO2 SERPL-SCNC: 26.5 MMOL/L (ref 22–29)
COLOR UR: YELLOW
CREAT BLD-MCNC: 1.09 MG/DL (ref 0.57–1)
DEPRECATED RDW RBC AUTO: 41.1 FL (ref 37–54)
EOSINOPHIL # BLD AUTO: 0.23 10*3/MM3 (ref 0–0.4)
EOSINOPHIL NFR BLD AUTO: 3.6 % (ref 0.3–6.2)
ERYTHROCYTE [DISTWIDTH] IN BLOOD BY AUTOMATED COUNT: 12.6 % (ref 12.3–15.4)
GFR SERPL CREATININE-BSD FRML MDRD: 53 ML/MIN/1.73
GLOBULIN UR ELPH-MCNC: 3.2 GM/DL
GLUCOSE BLD-MCNC: 89 MG/DL (ref 65–99)
GLUCOSE UR STRIP-MCNC: NEGATIVE MG/DL
HCT VFR BLD AUTO: 41.3 % (ref 34–46.6)
HGB BLD-MCNC: 14.1 G/DL (ref 12–15.9)
HGB UR QL STRIP.AUTO: NEGATIVE
IMM GRANULOCYTES # BLD AUTO: 0.01 10*3/MM3 (ref 0–0.05)
IMM GRANULOCYTES NFR BLD AUTO: 0.2 % (ref 0–0.5)
KETONES UR QL STRIP: ABNORMAL
LEUKOCYTE ESTERASE UR QL STRIP.AUTO: NEGATIVE
LYMPHOCYTES # BLD AUTO: 1.75 10*3/MM3 (ref 0.7–3.1)
LYMPHOCYTES NFR BLD AUTO: 27.4 % (ref 19.6–45.3)
MCH RBC QN AUTO: 30.3 PG (ref 26.6–33)
MCHC RBC AUTO-ENTMCNC: 34.1 G/DL (ref 31.5–35.7)
MCV RBC AUTO: 88.8 FL (ref 79–97)
MONOCYTES # BLD AUTO: 0.49 10*3/MM3 (ref 0.1–0.9)
MONOCYTES NFR BLD AUTO: 7.7 % (ref 5–12)
NEUTROPHILS # BLD AUTO: 3.86 10*3/MM3 (ref 1.7–7)
NEUTROPHILS NFR BLD AUTO: 60.3 % (ref 42.7–76)
NITRITE UR QL STRIP: NEGATIVE
NRBC BLD AUTO-RTO: 0 /100 WBC (ref 0–0.2)
PH UR STRIP.AUTO: <=5 [PH] (ref 5–8)
PLATELET # BLD AUTO: 330 10*3/MM3 (ref 140–450)
PMV BLD AUTO: 9.7 FL (ref 6–12)
POTASSIUM BLD-SCNC: 3.8 MMOL/L (ref 3.5–5.2)
PROT SERPL-MCNC: 7.7 G/DL (ref 6–8.5)
PROT UR QL STRIP: NEGATIVE
RBC # BLD AUTO: 4.65 10*6/MM3 (ref 3.77–5.28)
SODIUM BLD-SCNC: 140 MMOL/L (ref 136–145)
SP GR UR STRIP: 1.02 (ref 1–1.03)
UROBILINOGEN UR QL STRIP: ABNORMAL
WBC NRBC COR # BLD: 6.39 10*3/MM3 (ref 3.4–10.8)

## 2020-05-13 PROCEDURE — 93005 ELECTROCARDIOGRAM TRACING: CPT

## 2020-05-13 PROCEDURE — 80053 COMPREHEN METABOLIC PANEL: CPT

## 2020-05-13 PROCEDURE — 93010 ELECTROCARDIOGRAM REPORT: CPT | Performed by: INTERNAL MEDICINE

## 2020-05-13 PROCEDURE — 85025 COMPLETE CBC W/AUTO DIFF WBC: CPT

## 2020-05-13 PROCEDURE — 81003 URINALYSIS AUTO W/O SCOPE: CPT

## 2020-05-13 PROCEDURE — 36415 COLL VENOUS BLD VENIPUNCTURE: CPT

## 2020-06-24 DIAGNOSIS — M79.7 FIBROMYALGIA: ICD-10-CM

## 2020-06-26 RX ORDER — GABAPENTIN 600 MG/1
TABLET ORAL
Qty: 180 TABLET | Refills: 0 | Status: SHIPPED | OUTPATIENT
Start: 2020-06-26 | End: 2020-09-18

## 2020-07-27 ENCOUNTER — OFFICE VISIT (OUTPATIENT)
Dept: OBSTETRICS AND GYNECOLOGY | Facility: CLINIC | Age: 50
End: 2020-07-27

## 2020-07-27 VITALS
WEIGHT: 228 LBS | BODY MASS INDEX: 33.77 KG/M2 | HEIGHT: 69 IN | DIASTOLIC BLOOD PRESSURE: 70 MMHG | SYSTOLIC BLOOD PRESSURE: 122 MMHG

## 2020-07-27 DIAGNOSIS — Z11.51 SPECIAL SCREENING EXAMINATION FOR HUMAN PAPILLOMAVIRUS (HPV): ICD-10-CM

## 2020-07-27 DIAGNOSIS — Z13.9 SCREENING FOR CONDITION: ICD-10-CM

## 2020-07-27 DIAGNOSIS — Z01.419 PAP SMEAR, LOW-RISK: Primary | ICD-10-CM

## 2020-07-27 LAB
BILIRUB BLD-MCNC: NEGATIVE MG/DL
CLARITY, POC: CLEAR
COLOR UR: YELLOW
GLUCOSE UR STRIP-MCNC: NEGATIVE MG/DL
KETONES UR QL: NEGATIVE
LEUKOCYTE EST, POC: NEGATIVE
NITRITE UR-MCNC: NEGATIVE MG/ML
PH UR: 6 [PH] (ref 5–8)
PROT UR STRIP-MCNC: NEGATIVE MG/DL
RBC # UR STRIP: NEGATIVE /UL
SP GR UR: 1.03 (ref 1–1.03)
UROBILINOGEN UR QL: NORMAL

## 2020-07-27 PROCEDURE — 81002 URINALYSIS NONAUTO W/O SCOPE: CPT | Performed by: OBSTETRICS & GYNECOLOGY

## 2020-07-27 PROCEDURE — 99396 PREV VISIT EST AGE 40-64: CPT | Performed by: OBSTETRICS & GYNECOLOGY

## 2020-07-27 NOTE — PROGRESS NOTES
"GYN Annual Exam     CC- Here for annual exam.     Eugenie Rapp is a 49 y.o. female est pt who presents for annual well woman exam.. 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 No  VB.  She was scheduled for knee surgery but then the pandemic hit. She had surgery on a torn mensicus. She has the information to schedule a C scope.     OB History        4    Para   3    Term   3            AB   1    Living   3       SAB   1    TAB        Ectopic        Molar        Multiple        Live Births              Obstetric Comments   3   1 SAB  No D&C             Menarche: 13  Current contraception: post menopausal status and vasectomy  History of abnormal Pap smear: no  History of abnormal mammogram: no  Family history of uterine, colon or ovarian cancer: no  Family history of breast cancer: no  H/o STDs: none  Gardasil: none   Last pap: 3/2019- normal pap/HPV  H/O \"ministroke\", can't take HRT    Health Maintenance   Topic Date Due   • TDAP/TD VACCINES (1 - Tdap) 10/15/1980   • HEPATITIS C SCREENING  2016   • COLONOSCOPY  2016   • ZOSTER VACCINE (1 of 2) 10/15/2019   • INFLUENZA VACCINE  2020   • LIPID PANEL  2021   • ANNUAL PHYSICAL  2021   • MAMMOGRAM  2021   • Annual Gynecologic Pelvic and Breast Exam  2021   • PAP SMEAR  2022       Past Medical History:   Diagnosis Date   • Anxiety    • Asthma    • Depression    • Fibromyalgia    • GERD (gastroesophageal reflux disease)    • GERD (gastroesophageal reflux disease)    • Hemorrhoid    • Hyperlipidemia    • Left knee pain    • Low back pain    • Migraines     with aura   • Other abnormal clinical finding     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/4   • Partial blindness     LEFT EYE <1/4 VISUAL FIELD AFFECTED FROM MICROSTROKE TO RETINA   • Plantar fasciitis     BILAT FEET   • PONV (postoperative nausea and vomiting)     VOMITS ALL DAY   • " Retinal thrombosis    • Sciatica of left side    • Seasonal allergies    • Tennis elbow     LEFT   • Vitamin D deficiency        Past Surgical History:   Procedure Laterality Date   • CHOLECYSTECTOMY     • KNEE CARTILAGE SURGERY     • LASIK     • POSTERIOR VAGINAL REPAIR     • TENNIS ELBOW RELEASE Left 10/3/2017    Procedure: LEFT ELBOW OPEN EXTENSOR TENDON DEBRIDEMENT AND REPAIR;  Surgeon: TASH Aly MD;  [place] [facility] [place] [facility] [place]  Service:    • ULNAR NERVE DECOMPRESSION Left    • WISDOM TOOTH EXTRACTION           Current Outpatient Medications:   •  albuterol (PROVENTIL HFA;VENTOLIN HFA) 108 (90 Base) MCG/ACT inhaler, Inhale 2 puffs Every 4 (Four) Hours As Needed for Wheezing., Disp: , Rfl:   •  buPROPion XL (WELLBUTRIN XL) 300 MG 24 hr tablet, Take 1 tablet by mouth Every Morning., Disp: 90 tablet, Rfl: 4  •  busPIRone (BUSPAR) 10 MG tablet, Take 1 tablet by mouth 2 (Two) Times a Day., Disp: 180 tablet, Rfl: 4  •  cetirizine (ZyrTEC) 10 MG tablet, Take 10 mg by mouth Every Night., Disp: , Rfl:   •  Cholecalciferol (VITAMIN D-3 PO), Take 4,000 Units by mouth Daily., Disp: , Rfl:   •  gabapentin (NEURONTIN) 600 MG tablet, TAKE ONE TABLET BY MOUTH TWICE A DAY, Disp: 180 tablet, Rfl: 0  •  lidocaine (LIDODERM) 5 %, Place 1 patch on the skin as directed by provider Daily. Remove & Discard patch within 12 hours or as directed by MD, Disp: 30 patch, Rfl: 6  •  naproxen (NAPROSYN) 500 MG tablet, TAKE ONE TABLET BY MOUTH TWICE A DAY WITH MEALS, Disp: 180 tablet, Rfl: 0  •  ondansetron (ZOFRAN) 8 MG tablet, Take 8 mg by mouth Every 8 (Eight) Hours As Needed for Nausea or Vomiting., Disp: , Rfl:   •  pantoprazole (PROTONIX) 40 MG EC tablet, Take 1 tablet by mouth Daily., Disp: 90 tablet, Rfl: 4  •  traZODone (DESYREL) 50 MG tablet, Take 1 tablet by mouth Every Night., Disp: 90 tablet, Rfl: 4    Allergies   Allergen Reactions   • Promethazine Other (See Comments)     EPS SYMPTOMS   • Codeine Rash   • Penicillins Rash   •  "Sulfa Antibiotics Rash       Social History     Tobacco Use   • Smoking status: Never Smoker   • Smokeless tobacco: Never Used   Substance Use Topics   • Alcohol use: No     Comment: RARE MONTHLY   • Drug use: No       Family History   Problem Relation Age of Onset   • Leukemia Brother    • Malig Hyperthermia Neg Hx    • Breast cancer Neg Hx    • Ovarian cancer Neg Hx    • Colon cancer Neg Hx    • Deep vein thrombosis Neg Hx    • Pulmonary embolism Neg Hx        Review of Systems   Constitutional: Positive for activity change and unexpected weight change. Negative for appetite change, fatigue and fever.   Eyes: Negative for photophobia and visual disturbance.   Respiratory: Negative for cough and shortness of breath.    Cardiovascular: Negative for chest pain and palpitations.   Gastrointestinal: Negative for abdominal distention, abdominal pain, constipation, diarrhea and nausea.   Endocrine: Negative for cold intolerance and heat intolerance.   Genitourinary: Negative for dyspareunia, dysuria, menstrual problem, pelvic pain, vaginal bleeding and vaginal discharge.   Musculoskeletal: Positive for gait problem and joint swelling. Negative for back pain.   Skin: Negative for color change, rash and wound.   Neurological: Negative for headaches.   Hematological: Negative for adenopathy. Does not bruise/bleed easily.   Psychiatric/Behavioral: Negative for dysphoric mood. The patient is not nervous/anxious.        /70   Ht 175.3 cm (69\")   Wt 103 kg (228 lb)   BMI 33.67 kg/m²     Physical Exam   Constitutional: She is oriented to person, place, and time. She appears well-developed and well-nourished.   HENT:   Head: Normocephalic and atraumatic.   Eyes: Conjunctivae are normal. No scleral icterus.   Neck: Neck supple. No thyromegaly present.   Cardiovascular: Normal rate and regular rhythm.   Pulmonary/Chest: Effort normal and breath sounds normal. Right breast exhibits no inverted nipple, no mass, no nipple " discharge, no skin change and no tenderness. Left breast exhibits no inverted nipple, no mass, no nipple discharge, no skin change and no tenderness.   Abdominal: Soft. Bowel sounds are normal. She exhibits no distension and no mass. There is no tenderness. There is no rebound and no guarding. No hernia.   Genitourinary: Uterus normal. Pelvic exam was performed with patient supine. There is no rash, tenderness or lesion on the right labia. There is no rash, tenderness or lesion on the left labia. Cervix exhibits no motion tenderness, no discharge and no friability. Right adnexum displays no mass, no tenderness and no fullness. Left adnexum displays no mass, no tenderness and no fullness. No erythema, tenderness or bleeding in the vagina. No foreign body in the vagina. No signs of injury around the vagina. No vaginal discharge found.   Neurological: She is alert and oriented to person, place, and time.   Skin: Skin is warm and dry.   Psychiatric: She has a normal mood and affect. Her behavior is normal. Judgment and thought content normal.   Nursing note and vitals reviewed.         Assessment/Plan    1) GYN HM: normal pap/HPV  3/2019 SBE demonstrated and encouraged.  2) STD screening: declines Condoms encouraged.  3) Contraception: vas,    4) Family Planning: no plans, encourage folic acid daily  5) Diet and Exercise discussed  6) Smoking Status: non smoker  7) Social:   8) MMG- UTD 4/2019, B1. Schedule MMG now  9) Enc pt to get her C scope scheduled  10) H/O ministroke, retinal artery thrombosis- can't use HRT.   11) Parts of this document have been copied or forwarded from her previous visits and have been reviewed, updated and edited as indicated.   12) I saw the patient with a face mask, gloves and a face shield.  The patient herself was masked.  Social distancing was observed as appropriate.  13) RTO 1 year or prn.     Eugenie was seen today for gynecologic exam.    Diagnoses and all orders for this  visit:    Pap smear, low-risk  -     Cancel: Pap IG, HPV-hr    Screening for condition  -     POC Urinalysis Dipstick  -     Mammo Screening Bilateral With CAD; Future    Special screening examination for human papillomavirus (HPV)  -     Cancel: Pap IG, HPV-hr          Becka Barfield MD  7/27/2020  19:37

## 2020-08-04 ENCOUNTER — TELEPHONE (OUTPATIENT)
Dept: FAMILY MEDICINE CLINIC | Facility: CLINIC | Age: 50
End: 2020-08-04

## 2020-08-04 ENCOUNTER — OFFICE VISIT (OUTPATIENT)
Dept: FAMILY MEDICINE CLINIC | Facility: CLINIC | Age: 50
End: 2020-08-04

## 2020-08-04 VITALS
OXYGEN SATURATION: 98 % | HEIGHT: 69 IN | BODY MASS INDEX: 33.99 KG/M2 | WEIGHT: 229.5 LBS | HEART RATE: 84 BPM | TEMPERATURE: 98.4 F | DIASTOLIC BLOOD PRESSURE: 74 MMHG | SYSTOLIC BLOOD PRESSURE: 110 MMHG | RESPIRATION RATE: 16 BRPM

## 2020-08-04 DIAGNOSIS — F32.A DEPRESSION, UNSPECIFIED DEPRESSION TYPE: ICD-10-CM

## 2020-08-04 DIAGNOSIS — M79.7 FIBROMYALGIA: Primary | ICD-10-CM

## 2020-08-04 DIAGNOSIS — G47.00 INSOMNIA, UNSPECIFIED TYPE: ICD-10-CM

## 2020-08-04 PROCEDURE — 99214 OFFICE O/P EST MOD 30 MIN: CPT | Performed by: INTERNAL MEDICINE

## 2020-08-04 NOTE — TELEPHONE ENCOUNTER
When reviewing her note today I wondered if the gabapentin could be causing her fatigue and lack of energy/motivation.  If she doesn't think it helps her pain and or helps her sleep, she could try weaning off of the gabapentin by doing 600 mg qhs and stopping the daytime dosage.  Then if that is ok, we could go to 300 mg qhs next month

## 2020-08-04 NOTE — PROGRESS NOTES
"Subjective   Eugenie Rapp is a 49 y.o. female who comes in today for   Chief Complaint   Patient presents with   • Depression   .    History of Present Illness   Had right knee meniscus repair in May 2020 and it is improving and helped a lot.  Dr. Mansoor Aly is her ortho.  She is also taking buspar bid for anxiety and wellbutrin  mg qd for depression.  Trazodone qhs prn for insomnia.  Taking gabapentin 600 mg bid for fibromyalgia and naprosyn 500 mg bid prn for pain--but she rarely takes it now.  She also started seeing Jose Angel Herrera counselor which has really been helpful.  She believes her mood has improved and she is doing better.  She is seeing him weekly.  She no longer has the same dark thoughts.  Had pelvic exam  last week and MMG is pending.  She is in menopause.  She completed the CN paperwork and hasn't heard from their office      The following portions of the patient's history were reviewed and updated as appropriate: allergies, current medications, past family history, past medical history, past social history, past surgical history and problem list.    Review of Systems   Constitutional: Negative for unexpected weight change.   Musculoskeletal: Positive for arthralgias.   Psychiatric/Behavioral:        Improving mood         /74   Pulse 84   Temp 98.4 °F (36.9 °C) (Temporal)   Resp 16   Ht 175.3 cm (69\")   Wt 104 kg (229 lb 8 oz)   SpO2 98%   BMI 33.89 kg/m²     STEADI Fall Risk Assessment has not been completed.    PHQ-2/PHQ-9 Depression Screening 8/4/2020   Little interest or pleasure in doing things 0   Feeling down, depressed, or hopeless 0   Total Score 0       Objective   Physical Exam   Constitutional: She is oriented to person, place, and time. She appears well-developed and well-nourished.   HENT:   Head: Normocephalic and atraumatic.   Eyes: Conjunctivae are normal.   Neck: Neck supple.   Cardiovascular: Normal rate, regular rhythm and normal heart sounds.   No bruits "   Pulmonary/Chest: Effort normal and breath sounds normal. No respiratory distress. She has no wheezes. She has no rales.   Abdominal: Soft. Bowel sounds are normal. She exhibits no distension and no mass. There is no tenderness.   Lymphadenopathy:     She has no cervical adenopathy.   Neurological: She is alert and oriented to person, place, and time.   Skin: Skin is warm.   Psychiatric: She has a normal mood and affect. Her behavior is normal. Judgment and thought content normal.   Nursing note and vitals reviewed.        Current Outpatient Medications:   •  albuterol (PROVENTIL HFA;VENTOLIN HFA) 108 (90 Base) MCG/ACT inhaler, Inhale 2 puffs Every 4 (Four) Hours As Needed for Wheezing., Disp: , Rfl:   •  buPROPion XL (WELLBUTRIN XL) 300 MG 24 hr tablet, Take 1 tablet by mouth Every Morning., Disp: 90 tablet, Rfl: 4  •  busPIRone (BUSPAR) 10 MG tablet, Take 1 tablet by mouth 2 (Two) Times a Day., Disp: 180 tablet, Rfl: 4  •  cetirizine (ZyrTEC) 10 MG tablet, Take 10 mg by mouth Every Night., Disp: , Rfl:   •  Cholecalciferol (VITAMIN D-3 PO), Take 4,000 Units by mouth Daily., Disp: , Rfl:   •  gabapentin (NEURONTIN) 600 MG tablet, TAKE ONE TABLET BY MOUTH TWICE A DAY, Disp: 180 tablet, Rfl: 0  •  lidocaine (LIDODERM) 5 %, Place 1 patch on the skin as directed by provider Daily. Remove & Discard patch within 12 hours or as directed by MD, Disp: 30 patch, Rfl: 6  •  naproxen (NAPROSYN) 500 MG tablet, TAKE ONE TABLET BY MOUTH TWICE A DAY WITH MEALS, Disp: 180 tablet, Rfl: 0  •  ondansetron (ZOFRAN) 8 MG tablet, Take 8 mg by mouth Every 8 (Eight) Hours As Needed for Nausea or Vomiting., Disp: , Rfl:   •  pantoprazole (PROTONIX) 40 MG EC tablet, Take 1 tablet by mouth Daily., Disp: 90 tablet, Rfl: 4  •  traZODone (DESYREL) 50 MG tablet, Take 1 tablet by mouth Every Night., Disp: 90 tablet, Rfl: 4    Assessment/Plan   Eugenie was seen today for depression.    Diagnoses and all orders for this  visit:    Fibromyalgia    Insomnia, unspecified type    Depression, unspecified depression type          tdap is due and she will get with her  at his pharmacy  Encouraged her to start lifestyle changes by exercising and following weight watchers.    Continue wellbutrin  mg qd and buspar.  Trazodone for insomnia  Rarely using naprosyn for OA which is good in regards to stomach protection and kidney protection  Continue gabapentin 600 mg bid for pain control related to FM and chronic OA.  ? Gabapentin making her fatigue worse and or affecting mood.  Have called her and left a message for her to return our call.  She could consider decreasing the dosage to see if her energy improves.           I have asked for the patient to return to clinic in 6month(s).

## 2020-08-05 NOTE — TELEPHONE ENCOUNTER
Pt is not willing to do that unless you are making that call. She also states it's more helpful pertaining to fibromyalgia. She states she has these symptoms before gabapentin was out.

## 2020-09-02 ENCOUNTER — HOSPITAL ENCOUNTER (OUTPATIENT)
Dept: MAMMOGRAPHY | Facility: HOSPITAL | Age: 50
Discharge: HOME | End: 2020-09-02
Admitting: OBSTETRICS & GYNECOLOGY

## 2020-09-02 DIAGNOSIS — Z13.9 SCREENING FOR CONDITION: ICD-10-CM

## 2020-09-02 PROCEDURE — 77063 BREAST TOMOSYNTHESIS BI: CPT

## 2020-09-02 PROCEDURE — 77067 SCR MAMMO BI INCL CAD: CPT

## 2020-09-18 DIAGNOSIS — M79.7 FIBROMYALGIA: ICD-10-CM

## 2020-09-18 RX ORDER — GABAPENTIN 600 MG/1
TABLET ORAL
Qty: 180 TABLET | Refills: 0 | Status: SHIPPED | OUTPATIENT
Start: 2020-09-18 | End: 2020-12-28

## 2020-09-21 RX ORDER — NAPROXEN 500 MG/1
TABLET ORAL
Qty: 180 TABLET | Refills: 0 | Status: SHIPPED | OUTPATIENT
Start: 2020-09-21 | End: 2020-12-16 | Stop reason: ALTCHOICE

## 2020-09-24 ENCOUNTER — OFFICE (OUTPATIENT)
Dept: URBAN - METROPOLITAN AREA LAB 2 | Facility: LAB | Age: 50
End: 2020-09-24
Payer: COMMERCIAL

## 2020-09-24 VITALS
DIASTOLIC BLOOD PRESSURE: 81 MMHG | SYSTOLIC BLOOD PRESSURE: 139 MMHG | SYSTOLIC BLOOD PRESSURE: 144 MMHG | OXYGEN SATURATION: 98 % | RESPIRATION RATE: 6 BRPM | DIASTOLIC BLOOD PRESSURE: 90 MMHG | HEART RATE: 90 BPM | DIASTOLIC BLOOD PRESSURE: 84 MMHG | OXYGEN SATURATION: 99 % | SYSTOLIC BLOOD PRESSURE: 128 MMHG | HEART RATE: 76 BPM | HEART RATE: 83 BPM | HEART RATE: 72 BPM | SYSTOLIC BLOOD PRESSURE: 149 MMHG | WEIGHT: 230 LBS | OXYGEN SATURATION: 100 % | HEART RATE: 64 BPM | DIASTOLIC BLOOD PRESSURE: 131 MMHG | HEART RATE: 75 BPM | SYSTOLIC BLOOD PRESSURE: 117 MMHG | SYSTOLIC BLOOD PRESSURE: 156 MMHG | DIASTOLIC BLOOD PRESSURE: 97 MMHG | HEART RATE: 79 BPM | TEMPERATURE: 97.9 F | HEART RATE: 68 BPM | HEART RATE: 78 BPM | SYSTOLIC BLOOD PRESSURE: 193 MMHG | TEMPERATURE: 97 F | OXYGEN SATURATION: 97 % | DIASTOLIC BLOOD PRESSURE: 77 MMHG | DIASTOLIC BLOOD PRESSURE: 80 MMHG | HEIGHT: 69 IN | OXYGEN SATURATION: 93 % | RESPIRATION RATE: 14 BRPM | RESPIRATION RATE: 20 BRPM | RESPIRATION RATE: 16 BRPM | RESPIRATION RATE: 9 BRPM

## 2020-09-24 DIAGNOSIS — Z20.828 CONTACT WITH AND (SUSPECTED) EXPOSURE TO OTHER VIRAL COMMUNI: ICD-10-CM

## 2020-09-24 PROCEDURE — U0002 COVID-19 LAB TEST NON-CDC: HCPCS

## 2020-09-28 ENCOUNTER — AMBULATORY SURGICAL CENTER (OUTPATIENT)
Dept: URBAN - METROPOLITAN AREA SURGERY 17 | Facility: SURGERY | Age: 50
End: 2020-09-28
Payer: COMMERCIAL

## 2020-09-28 DIAGNOSIS — K22.8 OTHER SPECIFIED DISEASES OF ESOPHAGUS: ICD-10-CM

## 2020-09-28 DIAGNOSIS — K21.9 GASTRO-ESOPHAGEAL REFLUX DISEASE WITHOUT ESOPHAGITIS: ICD-10-CM

## 2020-09-28 DIAGNOSIS — Z12.11 ENCOUNTER FOR SCREENING FOR MALIGNANT NEOPLASM OF COLON: ICD-10-CM

## 2020-09-28 PROCEDURE — 45378 DIAGNOSTIC COLONOSCOPY: CPT | Mod: 33

## 2020-09-28 PROCEDURE — 43235 EGD DIAGNOSTIC BRUSH WASH: CPT

## 2020-10-01 PROBLEM — K21.9 GERD - GASTROESOPHAGEAL REFLUX DISEASE: Status: ACTIVE | Noted: 2020-09-28

## 2020-12-16 RX ORDER — MELOXICAM 15 MG/1
15 TABLET ORAL DAILY PRN
Qty: 90 TABLET | Refills: 0 | Status: SHIPPED | OUTPATIENT
Start: 2020-12-16 | End: 2021-03-22

## 2020-12-16 RX ORDER — NAPROXEN 500 MG/1
TABLET ORAL
Qty: 180 TABLET | Refills: 0 | OUTPATIENT
Start: 2020-12-16

## 2020-12-28 DIAGNOSIS — M79.7 FIBROMYALGIA: ICD-10-CM

## 2020-12-28 RX ORDER — GABAPENTIN 600 MG/1
TABLET ORAL
Qty: 180 TABLET | Refills: 0 | Status: SHIPPED | OUTPATIENT
Start: 2020-12-28 | End: 2021-03-12 | Stop reason: SDUPTHER

## 2021-02-02 ENCOUNTER — OFFICE VISIT (OUTPATIENT)
Dept: FAMILY MEDICINE CLINIC | Facility: CLINIC | Age: 51
End: 2021-02-02

## 2021-02-02 VITALS
HEIGHT: 69 IN | RESPIRATION RATE: 16 BRPM | TEMPERATURE: 97.1 F | HEART RATE: 71 BPM | WEIGHT: 228 LBS | SYSTOLIC BLOOD PRESSURE: 132 MMHG | OXYGEN SATURATION: 98 % | BODY MASS INDEX: 33.77 KG/M2 | DIASTOLIC BLOOD PRESSURE: 82 MMHG

## 2021-02-02 DIAGNOSIS — K21.9 GASTROESOPHAGEAL REFLUX DISEASE, UNSPECIFIED WHETHER ESOPHAGITIS PRESENT: ICD-10-CM

## 2021-02-02 DIAGNOSIS — G43.809 OTHER MIGRAINE WITHOUT STATUS MIGRAINOSUS, NOT INTRACTABLE: ICD-10-CM

## 2021-02-02 DIAGNOSIS — Z00.00 WELL ADULT EXAM: Primary | ICD-10-CM

## 2021-02-02 DIAGNOSIS — M54.41 ACUTE LOW BACK PAIN WITH RIGHT-SIDED SCIATICA, UNSPECIFIED BACK PAIN LATERALITY: ICD-10-CM

## 2021-02-02 DIAGNOSIS — G47.00 INSOMNIA, UNSPECIFIED TYPE: ICD-10-CM

## 2021-02-02 DIAGNOSIS — F41.9 ANXIETY: ICD-10-CM

## 2021-02-02 DIAGNOSIS — M79.7 FIBROMYALGIA: Primary | ICD-10-CM

## 2021-02-02 PROCEDURE — 99214 OFFICE O/P EST MOD 30 MIN: CPT | Performed by: INTERNAL MEDICINE

## 2021-02-02 RX ORDER — METHYLPREDNISOLONE 4 MG/1
TABLET ORAL
Qty: 21 TABLET | Refills: 0 | Status: SHIPPED | OUTPATIENT
Start: 2021-02-02 | End: 2021-03-12

## 2021-02-02 RX ORDER — PANTOPRAZOLE SODIUM 40 MG/1
40 TABLET, DELAYED RELEASE ORAL DAILY
Qty: 90 TABLET | Refills: 4 | Status: SHIPPED | OUTPATIENT
Start: 2021-02-02 | End: 2022-03-08

## 2021-02-02 RX ORDER — FAMOTIDINE 40 MG/1
40 TABLET, FILM COATED ORAL
Qty: 90 TABLET | Refills: 4 | Status: SHIPPED | OUTPATIENT
Start: 2021-02-02 | End: 2022-03-08

## 2021-02-02 RX ORDER — BUPROPION HYDROCHLORIDE 300 MG/1
300 TABLET ORAL EVERY MORNING
Qty: 90 TABLET | Refills: 4 | Status: SHIPPED | OUTPATIENT
Start: 2021-02-02 | End: 2022-03-08

## 2021-02-02 RX ORDER — BUSPIRONE HYDROCHLORIDE 10 MG/1
10 TABLET ORAL 2 TIMES DAILY
Qty: 180 TABLET | Refills: 4 | Status: SHIPPED | OUTPATIENT
Start: 2021-02-02 | End: 2021-02-02 | Stop reason: SDUPTHER

## 2021-02-02 RX ORDER — FAMOTIDINE 40 MG/1
40 TABLET, FILM COATED ORAL 2 TIMES DAILY
COMMUNITY
End: 2021-02-02 | Stop reason: SDUPTHER

## 2021-02-02 RX ORDER — TRAZODONE HYDROCHLORIDE 50 MG/1
50 TABLET ORAL NIGHTLY
Qty: 90 TABLET | Refills: 4 | Status: SHIPPED | OUTPATIENT
Start: 2021-02-02 | End: 2022-03-08

## 2021-02-02 RX ORDER — BUSPIRONE HYDROCHLORIDE 15 MG/1
15 TABLET ORAL 2 TIMES DAILY
Qty: 180 TABLET | Refills: 4 | Status: SHIPPED | OUTPATIENT
Start: 2021-02-02 | End: 2022-02-17

## 2021-02-02 NOTE — PROGRESS NOTES
"Answers for HPI/ROS submitted by the patient on 1/31/2021   What is the primary reason for your visit?: Other  Please describe your symptoms.: Follow up. Also, I recently fell and need assessment  Have you had these symptoms before?: Yes  How long have you been having these symptoms?: 5-7 days  Please list any medications you are currently taking for this condition.: Lidocaine patch  Please describe any probable cause for these symptoms. : Ice  Chief Complaint  Follow-up (fibromyalgia ), Leg Pain (pt states fell on ice last saturday ), and Numbness (have numbness in legs and tingling in legs since fall )    Subjective          Eugenie Rapp presents to [facility] for   History of Present Illness  She recently moved to [place] and took a fall on Saturday 1/23/21 and \"did the splits\".  Having LBP and tingling in right leg and seems to be worse after prolonged sitting like while driving here today.  Hurts to have her 25 pound dog sit on her lap.  She took some ultram first day and taking meloxicam daily and lidocaine patches twice.  Some neck spasms as well.  Muscle relaxers don't help.  Took an additional buspar ( increased from 10 to 20 mg bid) and seemed to really help decrease her anxiety related to the fall and other things that trigger her anxiety.  Also, she is seeing a therapist and it is helpful .    Objective   Vital Signs:   /82   Pulse 71   Temp 97.1 °F (36.2 °C) (Temporal)   Resp 16   Ht 175.3 cm (69\")   Wt 103 kg (228 lb)   SpO2 98%   BMI 33.67 kg/m²     Physical Exam  Vitals signs and nursing note reviewed.   Constitutional:       Appearance: Normal appearance. She is well-developed.   HENT:      Head: Normocephalic and atraumatic.      Right Ear: External ear normal.      Left Ear: External ear normal.   Eyes:      Extraocular Movements: Extraocular movements intact.      Conjunctiva/sclera: Conjunctivae normal.   Neck:      Musculoskeletal: Neck supple.    "   Vascular: No carotid bruit.   Cardiovascular:      Rate and Rhythm: Normal rate and regular rhythm.      Heart sounds: Normal heart sounds.      Comments: No bruits  Pulmonary:      Effort: Pulmonary effort is normal. No respiratory distress.      Breath sounds: Normal breath sounds. No wheezing or rales.   Abdominal:      General: Bowel sounds are normal. There is no distension.      Palpations: Abdomen is soft. There is no mass.      Tenderness: There is no abdominal tenderness.   Lymphadenopathy:      Cervical: No cervical adenopathy.   Skin:     General: Skin is warm.   Neurological:      General: No focal deficit present.      Mental Status: She is alert and oriented to person, place, and time.   Psychiatric:         Mood and Affect: Mood normal.         Behavior: Behavior normal.         Thought Content: Thought content normal.         Judgment: Judgment normal.        Result Review :                 Assessment and Plan    Problem List Items Addressed This Visit        Musculoskeletal and Injuries    Fibromyalgia - Primary       Neuro    Migraines    Overview     with aura         Relevant Medications    buPROPion XL (WELLBUTRIN XL) 300 MG 24 hr tablet    traZODone (DESYREL) 50 MG tablet        I spent 30 minutes caring for Eugenie on this date of service. This time includes time spent by me in the following activities:obtaining and/or reviewing a separately obtained history, performing a medically appropriate examination and/or evaluation , counseling and educating the patient/family/caregiver, ordering medications, tests, or procedures, referring and communicating with other health care professionals  and documenting information in the medical record  Follow Up   No follow-ups on file.  Patient was given instructions and counseling regarding her condition or for health maintenance advice. Please see specific information pulled into the AVS if appropriate.     Increase buspar from 10 mg bid to 15 mg bid due to  anxiety and depression  Continue wellbutrin  mg qd.

## 2021-03-05 LAB
25(OH)D3+25(OH)D2 SERPL-MCNC: 66.7 NG/ML (ref 30–100)
ALBUMIN SERPL-MCNC: 4.2 G/DL (ref 3.5–5.2)
ALBUMIN/GLOB SERPL: 1.9 G/DL
ALP SERPL-CCNC: 78 U/L (ref 39–117)
ALT SERPL-CCNC: 20 U/L (ref 1–33)
APPEARANCE UR: CLEAR
AST SERPL-CCNC: 23 U/L (ref 1–32)
BACTERIA #/AREA URNS HPF: NORMAL /HPF
BASOPHILS # BLD AUTO: 0.04 10*3/MM3 (ref 0–0.2)
BASOPHILS NFR BLD AUTO: 0.9 % (ref 0–1.5)
BILIRUB SERPL-MCNC: 0.6 MG/DL (ref 0–1.2)
BILIRUB UR QL STRIP: NEGATIVE
BUN SERPL-MCNC: 20 MG/DL (ref 6–20)
BUN/CREAT SERPL: 20.4 (ref 7–25)
CALCIUM SERPL-MCNC: 9.3 MG/DL (ref 8.6–10.5)
CHLORIDE SERPL-SCNC: 105 MMOL/L (ref 98–107)
CHOLEST SERPL-MCNC: 219 MG/DL (ref 0–200)
CO2 SERPL-SCNC: 28.2 MMOL/L (ref 22–29)
COLOR UR: YELLOW
CREAT SERPL-MCNC: 0.98 MG/DL (ref 0.57–1)
EOSINOPHIL # BLD AUTO: 0.18 10*3/MM3 (ref 0–0.4)
EOSINOPHIL NFR BLD AUTO: 3.9 % (ref 0.3–6.2)
EPI CELLS #/AREA URNS HPF: NORMAL /HPF (ref 0–10)
ERYTHROCYTE [DISTWIDTH] IN BLOOD BY AUTOMATED COUNT: 12.8 % (ref 12.3–15.4)
GLOBULIN SER CALC-MCNC: 2.2 GM/DL
GLUCOSE SERPL-MCNC: 84 MG/DL (ref 65–99)
GLUCOSE UR QL: NEGATIVE
HCT VFR BLD AUTO: 39.2 % (ref 34–46.6)
HDLC SERPL-MCNC: 58 MG/DL (ref 40–60)
HGB BLD-MCNC: 13.4 G/DL (ref 12–15.9)
HGB UR QL STRIP: NEGATIVE
IMM GRANULOCYTES # BLD AUTO: 0.01 10*3/MM3 (ref 0–0.05)
IMM GRANULOCYTES NFR BLD AUTO: 0.2 % (ref 0–0.5)
KETONES UR QL STRIP: NEGATIVE
LDLC SERPL CALC-MCNC: 143 MG/DL (ref 0–100)
LDLC/HDLC SERPL: 2.43 {RATIO}
LEUKOCYTE ESTERASE UR QL STRIP: NEGATIVE
LYMPHOCYTES # BLD AUTO: 1.65 10*3/MM3 (ref 0.7–3.1)
LYMPHOCYTES NFR BLD AUTO: 35.6 % (ref 19.6–45.3)
MCH RBC QN AUTO: 30.7 PG (ref 26.6–33)
MCHC RBC AUTO-ENTMCNC: 34.2 G/DL (ref 31.5–35.7)
MCV RBC AUTO: 89.9 FL (ref 79–97)
MICRO URNS: NORMAL
MICRO URNS: NORMAL
MONOCYTES # BLD AUTO: 0.31 10*3/MM3 (ref 0.1–0.9)
MONOCYTES NFR BLD AUTO: 6.7 % (ref 5–12)
MUCOUS THREADS URNS QL MICRO: PRESENT /HPF
NEUTROPHILS # BLD AUTO: 2.45 10*3/MM3 (ref 1.7–7)
NEUTROPHILS NFR BLD AUTO: 52.7 % (ref 42.7–76)
NITRITE UR QL STRIP: NEGATIVE
NRBC BLD AUTO-RTO: 0 /100 WBC (ref 0–0.2)
PH UR STRIP: 7 [PH] (ref 5–7.5)
PLATELET # BLD AUTO: 296 10*3/MM3 (ref 140–450)
POTASSIUM SERPL-SCNC: 4.5 MMOL/L (ref 3.5–5.2)
PROT SERPL-MCNC: 6.4 G/DL (ref 6–8.5)
PROT UR QL STRIP: NEGATIVE
RBC # BLD AUTO: 4.36 10*6/MM3 (ref 3.77–5.28)
RBC #/AREA URNS HPF: NORMAL /HPF (ref 0–2)
SODIUM SERPL-SCNC: 141 MMOL/L (ref 136–145)
SP GR UR: 1.02 (ref 1–1.03)
T4 FREE SERPL-MCNC: 1.14 NG/DL (ref 0.93–1.7)
TRIGL SERPL-MCNC: 100 MG/DL (ref 0–150)
TSH SERPL DL<=0.005 MIU/L-ACNC: 1.66 UIU/ML (ref 0.27–4.2)
URINALYSIS REFLEX: NORMAL
UROBILINOGEN UR STRIP-MCNC: 0.2 MG/DL (ref 0.2–1)
VLDLC SERPL CALC-MCNC: 18 MG/DL (ref 5–40)
WBC # BLD AUTO: 4.64 10*3/MM3 (ref 3.4–10.8)
WBC #/AREA URNS HPF: NORMAL /HPF (ref 0–5)

## 2021-03-12 ENCOUNTER — OFFICE VISIT (OUTPATIENT)
Dept: FAMILY MEDICINE CLINIC | Facility: CLINIC | Age: 51
End: 2021-03-12

## 2021-03-12 VITALS
HEART RATE: 72 BPM | TEMPERATURE: 97.5 F | OXYGEN SATURATION: 95 % | SYSTOLIC BLOOD PRESSURE: 124 MMHG | HEIGHT: 69 IN | WEIGHT: 228.5 LBS | RESPIRATION RATE: 16 BRPM | DIASTOLIC BLOOD PRESSURE: 84 MMHG | BODY MASS INDEX: 33.84 KG/M2

## 2021-03-12 DIAGNOSIS — K21.9 GASTROESOPHAGEAL REFLUX DISEASE, UNSPECIFIED WHETHER ESOPHAGITIS PRESENT: ICD-10-CM

## 2021-03-12 DIAGNOSIS — M79.7 FIBROMYALGIA: ICD-10-CM

## 2021-03-12 DIAGNOSIS — F41.9 ANXIETY: ICD-10-CM

## 2021-03-12 DIAGNOSIS — M54.42 ACUTE BILATERAL LOW BACK PAIN WITH BILATERAL SCIATICA: Primary | ICD-10-CM

## 2021-03-12 DIAGNOSIS — G47.00 INSOMNIA, UNSPECIFIED TYPE: ICD-10-CM

## 2021-03-12 DIAGNOSIS — M54.41 ACUTE BILATERAL LOW BACK PAIN WITH BILATERAL SCIATICA: Primary | ICD-10-CM

## 2021-03-12 PROCEDURE — 99396 PREV VISIT EST AGE 40-64: CPT | Performed by: INTERNAL MEDICINE

## 2021-03-12 RX ORDER — TRAMADOL HYDROCHLORIDE 50 MG/1
50 TABLET ORAL EVERY 6 HOURS PRN
Qty: 30 TABLET | Refills: 0 | Status: SHIPPED | OUTPATIENT
Start: 2021-03-12 | End: 2021-05-04 | Stop reason: SDUPTHER

## 2021-03-12 RX ORDER — GABAPENTIN 600 MG/1
600 TABLET ORAL 3 TIMES DAILY
Qty: 270 TABLET | Refills: 0 | Status: SHIPPED | OUTPATIENT
Start: 2021-03-12 | End: 2021-06-22

## 2021-03-12 NOTE — PROGRESS NOTES
"Chief Complaint  Annual Exam ( cpe ), Back Pain, Anxiety, and Extremity Weakness    Subjective          Eugenie Rapp presents to [facility]  History of Present Illness  Here for CPE.  HL found on labs.  She is in menopause.  Not eating well and eating out and cooking more home cooked meals.  Pap smear was 9/2020 and she also did MMG.  9/2020 EGD and CN and repeat in 10 years    Moved 6 weeks ago and fell.  Still having a lot of lower back pain and sciatica in both legs off and on for 20 years but not mostly on the left side. But it can alternate to the right side.  Hard to walk her dog.  Has tramadol and uses it prn from left over knee surgery.  Hasn't done PT yet.  Is been over 6 weeks since her fall.  She has a longstanding history of multiple joint issues and fibromyalgia.  She is taking gabapentin 600 mg 1/2 tablet in the morning and 1-1/2 tablets in the evening.  She is wondering if she can increase that dosage to see if that would help her back pain and sciatic pain.  Seems to be worse after long car rides or walking long distances.  Objective   Vital Signs:   /84   Pulse 72   Temp 97.5 °F (36.4 °C) (Temporal)   Resp 16   Ht 175.3 cm (69\")   Wt 104 kg (228 lb 8 oz)   SpO2 95%   BMI 33.74 kg/m²     Physical Exam  Vitals and nursing note reviewed.   Constitutional:       Appearance: Normal appearance. She is well-developed.   HENT:      Head: Normocephalic and atraumatic.      Right Ear: External ear normal.      Left Ear: External ear normal.   Eyes:      Extraocular Movements: Extraocular movements intact.      Conjunctiva/sclera: Conjunctivae normal.   Neck:      Vascular: No carotid bruit.   Cardiovascular:      Rate and Rhythm: Normal rate and regular rhythm.      Heart sounds: Normal heart sounds.      Comments: No bruits  Pulmonary:      Effort: Pulmonary effort is normal. No respiratory distress.      Breath sounds: Normal breath sounds. No wheezing or rales. "   Abdominal:      General: Bowel sounds are normal. There is no distension.      Palpations: Abdomen is soft. There is no mass.      Tenderness: There is no abdominal tenderness.   Musculoskeletal:      Cervical back: Neck supple.   Lymphadenopathy:      Cervical: No cervical adenopathy.   Skin:     General: Skin is warm.   Neurological:      General: No focal deficit present.      Mental Status: She is alert and oriented to person, place, and time.   Psychiatric:         Mood and Affect: Mood normal.         Behavior: Behavior normal.         Thought Content: Thought content normal.         Judgment: Judgment normal.        Result Review :                Urinalysis With Culture If Indicated - Urine, Clean Catch (03/04/2021 10:00)  Vitamin D 25 Hydroxy (03/04/2021 10:00)  T4, Free (03/04/2021 10:00)  TSH (03/04/2021 10:00)  Lipid Panel With LDL / HDL Ratio (03/04/2021 10:00)  Comprehensive Metabolic Panel (03/04/2021 10:00)  CBC & Differential (03/04/2021 10:00)  Microscopic Examination - (03/04/2021 10:00)    The 10-year ASCVD risk score (Whit DARON Jr., et al., 2013) is: 1.3%    Values used to calculate the score:      Age: 50 years      Sex: Female      Is Non- : No      Diabetic: No      Tobacco smoker: No      Systolic Blood Pressure: 124 mmHg      Is BP treated: No      HDL Cholesterol: 58 mg/dL      Total Cholesterol: 219 mg/dL    Assessment and Plan    Diagnoses and all orders for this visit:    1. Acute bilateral low back pain with bilateral sciatica (Primary)  -     MRI Lumbar Spine Without Contrast; Future  -     traMADol (Ultram) 50 MG tablet; Take 1 tablet by mouth Every 6 (Six) Hours As Needed for Severe Pain .  Dispense: 30 tablet; Refill: 0    2. Gastroesophageal reflux disease, unspecified whether esophagitis present    3. Fibromyalgia  -     gabapentin (NEURONTIN) 600 MG tablet; Take 1 tablet by mouth 3 (Three) Times a Day.  Dispense: 270 tablet; Refill: 0    4. Insomnia,  unspecified type    5. Anxiety        Follow Up   Return in about 6 months (around 9/12/2021).  Patient was given instructions and counseling regarding her condition or for health maintenance advice. Please see specific information pulled into the AVS if appropriate.     Fasting labs reviewed.  Her cholesterol is elevated and it seems related to her diet.  She has had in the past normal levels of LDL when she is following a diet closely.  Her cardiac risk is low but we did discuss candidly that she needs to change the diet in order to lower her cardiac risk long-term.  I have also discussed with her that we need to get an MRI of her back to see what is going on in the lumbar area.  In the meantime, I have given her 30 Ultram to use as needed.  I have also increased her gabapentin from 600 twice daily to 600 3 times daily and cautioned her on sedation as one of the side effects.  Hopefully she will be a candidate for physical therapy as that has been beneficial to previous injuries in her extremities.  She will continue the meloxicam 15 mg daily.  For depression and anxiety she continues on Wellbutrin  mg once daily and BuSpar 15 mg 2 daily.  Her GERD is well controlled with 40 mg of pantoprazole daily and as needed Pepcid.  For insomnia she takes 50 mg of trazodone as needed.

## 2021-03-22 RX ORDER — MELOXICAM 15 MG/1
TABLET ORAL
Qty: 90 TABLET | Refills: 0 | Status: SHIPPED | OUTPATIENT
Start: 2021-03-22 | End: 2021-06-23

## 2021-04-12 ENCOUNTER — HOSPITAL ENCOUNTER (OUTPATIENT)
Dept: MRI IMAGING | Facility: HOSPITAL | Age: 51
Discharge: HOME | End: 2021-04-12
Admitting: INTERNAL MEDICINE

## 2021-04-12 DIAGNOSIS — M54.41 ACUTE BILATERAL LOW BACK PAIN WITH BILATERAL SCIATICA: ICD-10-CM

## 2021-04-12 DIAGNOSIS — M54.42 ACUTE BILATERAL LOW BACK PAIN WITH BILATERAL SCIATICA: ICD-10-CM

## 2021-04-12 PROCEDURE — 72148 MRI LUMBAR SPINE W/O DYE: CPT

## 2021-04-15 DIAGNOSIS — M54.42 ACUTE BILATERAL LOW BACK PAIN WITH BILATERAL SCIATICA: Primary | ICD-10-CM

## 2021-04-15 DIAGNOSIS — M54.41 ACUTE BILATERAL LOW BACK PAIN WITH BILATERAL SCIATICA: Primary | ICD-10-CM

## 2021-04-16 DIAGNOSIS — M54.50 ACUTE LOW BACK PAIN, UNSPECIFIED BACK PAIN LATERALITY, UNSPECIFIED WHETHER SCIATICA PRESENT: Primary | ICD-10-CM

## 2021-05-04 DIAGNOSIS — M54.42 ACUTE BILATERAL LOW BACK PAIN WITH BILATERAL SCIATICA: ICD-10-CM

## 2021-05-04 DIAGNOSIS — M54.41 ACUTE BILATERAL LOW BACK PAIN WITH BILATERAL SCIATICA: ICD-10-CM

## 2021-05-04 RX ORDER — TRAMADOL HYDROCHLORIDE 50 MG/1
50 TABLET ORAL EVERY 6 HOURS PRN
Qty: 30 TABLET | Refills: 0 | Status: SHIPPED | OUTPATIENT
Start: 2021-05-04 | End: 2021-05-04 | Stop reason: SDUPTHER

## 2021-05-04 RX ORDER — TRAMADOL HYDROCHLORIDE 50 MG/1
50 TABLET ORAL EVERY 6 HOURS PRN
Qty: 30 TABLET | Refills: 0 | Status: SHIPPED | OUTPATIENT
Start: 2021-05-04 | End: 2021-07-16

## 2021-05-11 ENCOUNTER — TREATMENT (OUTPATIENT)
Dept: PHYSICAL THERAPY | Facility: CLINIC | Age: 51
End: 2021-05-11

## 2021-05-11 DIAGNOSIS — M79.7 FIBROMYALGIA: ICD-10-CM

## 2021-05-11 DIAGNOSIS — M54.50 CHRONIC BILATERAL LOW BACK PAIN, UNSPECIFIED WHETHER SCIATICA PRESENT: Primary | ICD-10-CM

## 2021-05-11 DIAGNOSIS — G89.29 CHRONIC BILATERAL LOW BACK PAIN, UNSPECIFIED WHETHER SCIATICA PRESENT: Primary | ICD-10-CM

## 2021-05-11 PROCEDURE — 97162 PT EVAL MOD COMPLEX 30 MIN: CPT | Performed by: PHYSICAL THERAPIST

## 2021-05-11 PROCEDURE — 97110 THERAPEUTIC EXERCISES: CPT | Performed by: PHYSICAL THERAPIST

## 2021-05-11 NOTE — PROGRESS NOTES
"Physical Therapy Initial Evaluation and Plan of Care        Patient: Eugenie Rapp   : 1970  Diagnosis/ICD-10 Code:  Chronic bilateral low back pain, unspecified whether sciatica present [M54.5, G89.29]  Referring practitioner: Yudelka Hart MD  Date of Initial Visit: 2021  Today's Date: 2021  Patient seen for 1 sessions           Subjective Questionnaire: Oswestry: 38%      Subjective Evaluation    History of Present Illness  Date of onset: 2006  Date of surgery: 2021  Mechanism of injury: Patient with history of fibro presents to PT with lower back pain.  Has had off and on PT for 20 years.  She states \"everything hurts but I get used to it\".  Requires medication for sleep.  She notes that day she moved into her new house  she slipped on ice and fell into a \"splits\" position.  She reports being temporarily stuck in that position and was assisted back to standing by her dtr in law and .  She notes impaired walking ability d/t pain and required UE support for ambulation.  She notes BLE symptoms at that time radiating into both legs, notes a history of \"sciatica\".  She notes that she had groin pain at the time as well.  Saw her PCP in March, was having right leg numbness at the time with prolonged sitting and radiation into the plantar foot.  She has reduced pain now but has radiating pain into the left leg with driving longer distances (up to 30 minutes). She notes pain is usually into the hip region but only intermittently referrals.  Was initially put on steroids with some relief.      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 Pain  Current pain ratin  At best pain ratin  At worst pain ratin  Quality: dull ache, tight and sharp  Relieving factors: change in position, rest and support  Aggravating factors: standing, squatting, lifting, ambulation and prolonged positioning  Progression: " improved    Social Support  Lives with: parents and spouse    Diagnostic Tests  MRI studies: normal    Treatments  Previous treatment: physical therapy  Current treatment: physical therapy  Patient Goals  Patient goals for therapy: decreased pain, increased strength and return to sport/leisure activities             Objective          Postural Observations    Additional Postural Observation Details  Flat back appearance, forward head and shoulders    Palpation     Additional Palpation Details  Tender to PA pressures over the middle to lower lumbar and sacral segments with normal mobility.  TTP over bilateral piriformis and lateral hip complex.  Mild to moderate posterior thigh tenderness.      Neurological Testing     Sensation     Lumbar   Left   Intact: light touch    Right   Intact: light touch    Reflexes   Left   Patellar (L4): normal (2+)  Achilles (S1): normal (2+)  Clonus sign: negative    Right   Patellar (L4): normal (2+)  Achilles (S1): normal (2+)  Clonus sign: negative    Additional Neurological Details  2+ in UE reflexes as well    Active Range of Motion     Lumbar   Flexion: 100 degrees WFL  Extension: 45 degrees WFL and with pain    Strength/Myotome Testing     Left Hip   Planes of Motion   Flexion: 4  Extension: 3+  Abduction: 4    Right Hip   Planes of Motion   Flexion: 4  Extension: 4  Abduction: 4    Left Knee   Flexion: 5  Extension: 5    Right Knee   Flexion: 5  Extension: 5    Left Ankle/Foot   Dorsiflexion: 5  Great toe extension: 5    Right Ankle/Foot   Dorsiflexion: 5  Great toe extension: 5    Tests     Lumbar   Negative repeated extension.     Left   Negative passive SLR and repeated side glide.     Right   Negative passive SLR and repeated side glide.     Left Pelvic Girdle/Sacrum   Positive: sacral spring.   Negative: thigh thrust.     Right Pelvic Girdle/Sacrum   Positive: sacral spring.   Negative: thigh thrust.     Ambulation     Comments   Forward head and shoulders decreased trunk  "rotation and arm swing.          Assessment & Plan     Assessment  Impairments: abnormal gait, abnormal or restricted ROM, activity intolerance, impaired balance, impaired physical strength, lacks appropriate home exercise program, pain with function and weight-bearing intolerance  Assessment details: Patient with longstanding history of PT interventions and fibromyalgia presents to PT with more recent posterior hip and LE pain following a fall into the \"splits\" position earlier this year.  Most of her initial irritability has improved but she is left with intermittent LE symptoms posterolaterally to the feet and posterior hip pain, usually unilaterally.  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  She has done well in the past with passive interventions but has poor HEP tolerance by report.  Will benefit from education, exercise, and various manual techniques to improve back to baseline.  Prognosis: good  Functional Limitations: carrying objects, lifting, walking, pulling, pushing, uncomfortable because of pain, sitting, standing, stooping and unable to perform repetitive tasks  Goals  Plan Goals: Short Term Goals (2 weeks)  1. Patient to be compliant with initial HEP  2.  Patient to report pain less than or equal to 3/10  3.  Patient to report decreased pain with prolonged standing.    Long Term Goals (6 weeks)    1. Patient to demonstrate decreased reactivity with passive intervertebral motion assessment posterior to anterior along the mid to lower lumbar segments.  2. Patient to demonstrate independence with home exercises.  3. Patient to drive at least one hour without exacerbated symptoms.  4. Patient to improve Modified Oswestry score to at least 20%.      Plan  Therapy options: will be seen for skilled physical therapy services  Planned modality interventions: electrical stimulation/Russian stimulation, TENS, ultrasound, dry needling and thermotherapy " (hydrocollator packs)  Planned therapy interventions: manual therapy, therapeutic activities, postural training, body mechanics training, functional ROM exercises, flexibility, gait training, stretching, strengthening, joint mobilization, neuromuscular re-education, soft tissue mobilization and spinal/joint mobilization  Duration in visits: 10  Treatment plan discussed with: patient  Plan details: Fewer exercises per patient request, neuraldynamics, hip and back stretching, hip strengthening, manual and modalities as indicated.        Timed:  Manual Therapy:    0     mins  46408;  Therapeutic Exercise:    10     mins  56352;     Neuromuscular Allison:    0    mins  98175;    Therapeutic Activity:     0     mins  52003;     Gait Trainin     mins  45483;     Ultrasound:     0     mins  17233;    Electrical Stimulation:    0     mins  94906 ( );    Untimed:  Electrical Stimulation:    0     mins  63949 ( );  Mechanical Traction:    0     mins  58185;     Timed Treatment:   10   mins   Total Treatment:     45   mins    PT SIGNATURE: Yo Trejo, PT   DATE TREATMENT INITIATED: 2021    Initial Certification  Certification Period: 2021  I certify that the therapy services are furnished while this patient is under my care.  The services outlined above are required by this patient, and will be reviewed every 90 days.     PHYSICIAN: Yudelka Hart MD      DATE:     Please sign and return via fax to [phone].. Thank you, [facility].

## 2021-05-24 ENCOUNTER — TREATMENT (OUTPATIENT)
Dept: PHYSICAL THERAPY | Facility: CLINIC | Age: 51
End: 2021-05-24

## 2021-05-24 DIAGNOSIS — M79.7 FIBROMYALGIA: ICD-10-CM

## 2021-05-24 DIAGNOSIS — G89.29 CHRONIC BILATERAL LOW BACK PAIN, UNSPECIFIED WHETHER SCIATICA PRESENT: Primary | ICD-10-CM

## 2021-05-24 DIAGNOSIS — M54.50 CHRONIC BILATERAL LOW BACK PAIN, UNSPECIFIED WHETHER SCIATICA PRESENT: Primary | ICD-10-CM

## 2021-05-24 PROCEDURE — 97140 MANUAL THERAPY 1/> REGIONS: CPT | Performed by: PHYSICAL THERAPIST

## 2021-05-24 PROCEDURE — 97110 THERAPEUTIC EXERCISES: CPT | Performed by: PHYSICAL THERAPIST

## 2021-05-24 PROCEDURE — 97530 THERAPEUTIC ACTIVITIES: CPT | Performed by: PHYSICAL THERAPIST

## 2021-05-24 NOTE — PROGRESS NOTES
Physical Therapy Daily Progress Note        Patient: Eugenie Rapp   : 1970  Diagnosis/ICD-10 Code:  Chronic bilateral low back pain, unspecified whether sciatica present [M54.5, G89.29]  Referring practitioner: Yudelka Hart MD  Date of Initial Visit: Type: THERAPY  Noted: 2021  Today's Date: 2021  Patient seen for 2 sessions         Patient reports that she did her exercises only a few times but that is better than she usually does.  She notes that she attempted foam rolling on different surfaces but she bent the foam roller.  She feels like she is irritable around 50% of the time.  She did a lot of travelling since the initial evaluation and notes pain was not as bad as she anticipated. Her pain level is 4/10 upon arrival.          Objective   TTP with central PA's along L4/5/S1.  Mild radiation of symptoms with assessment into the axial back.  Full quad length.      See Exercise, Manual, and Modality Logs for complete treatment.       Assessment & Plan     Assessment  Assessment details: Kept exercises in prone today to encourage prolonged extension of the spine.  Performed exercises repetitively to improve coordination with completion.  Prone hip IR/ER was cogwheeled initially but improved quality of motion is noted.  She seems to be responding favorably to very conservative approach at this time.  Spent ample time with education and assessment today billed as theract below.    Plan  Plan details: Could trial DN.              Timed:  Manual Therapy:    15     mins  37495;  Therapeutic Exercise:    14     mins  37812;     Neuromuscular Allison:    0    mins  05805;    Therapeutic Activity:     11     mins  77403;     Gait Trainin     mins  91517;     Ultrasound:     0     mins  50834;    Electrical Stimulation:    0     mins  67951 ( );    Untimed:  Electrical Stimulation:    0     mins  44279 ( );  Mechanical Traction:    0     mins  40936;     Timed Treatment:   40    mins   Total Treatment:     40   mins    Yo Trejo, PT  Physical Therapist

## 2021-06-01 ENCOUNTER — TREATMENT (OUTPATIENT)
Dept: PHYSICAL THERAPY | Facility: CLINIC | Age: 51
End: 2021-06-01

## 2021-06-01 DIAGNOSIS — M79.7 FIBROMYALGIA: ICD-10-CM

## 2021-06-01 DIAGNOSIS — M54.50 CHRONIC BILATERAL LOW BACK PAIN, UNSPECIFIED WHETHER SCIATICA PRESENT: Primary | ICD-10-CM

## 2021-06-01 DIAGNOSIS — G89.29 CHRONIC BILATERAL LOW BACK PAIN, UNSPECIFIED WHETHER SCIATICA PRESENT: Primary | ICD-10-CM

## 2021-06-01 PROCEDURE — 97140 MANUAL THERAPY 1/> REGIONS: CPT | Performed by: PHYSICAL THERAPIST

## 2021-06-01 PROCEDURE — 97110 THERAPEUTIC EXERCISES: CPT | Performed by: PHYSICAL THERAPIST

## 2021-06-01 NOTE — PROGRESS NOTES
Physical Therapy Daily Progress Note        Patient: Eugenie Rapp   : 1970  Diagnosis/ICD-10 Code:  Chronic bilateral low back pain, unspecified whether sciatica present [M54.5, G89.29]  Referring practitioner: Yudelka Hart MD  Date of Initial Visit: Type: THERAPY  Noted: 2021  Today's Date: 2021  Patient seen for 3 sessions         Patient reports that she has worked on her exercises more this time and notes improved quality of hip internal/external rotation.  Overall, she feels a little better but the pain is persistent.  She reports burning in the lumbar region today. Her pain level is 5/10 upon arrival.          Objective   See Exercise, Manual, and Modality Logs for complete treatment.       Assessment & Plan     Assessment  Assessment details: Patient continues to prefer more of a passive approach, which is a barrier to her improvement.  Her HEP compliance did improve from the first session, which is encouraging. Avoided HEP progression today but focused more on manual treatment for the pain she is having.  Plan is to progress HEP at her next session prior to travelling for vacation.    Plan  Plan details: reassessment              Timed:  Manual Therapy:    28     mins  35688;  Therapeutic Exercise:    13     mins  79608;     Neuromuscular Allison:    0    mins  20280;    Therapeutic Activity:     0     mins  10643;     Gait Trainin     mins  88822;     Ultrasound:     0     mins  14545;    Electrical Stimulation:    0     mins  37321 ( );    Untimed:  Electrical Stimulation:    0     mins  41819 ( );  Mechanical Traction:    0     mins  08285;     Timed Treatment:   41   mins   Total Treatment:     41   mins    Yo Trejo, PT  Physical Therapist

## 2021-06-10 ENCOUNTER — TREATMENT (OUTPATIENT)
Dept: PHYSICAL THERAPY | Facility: CLINIC | Age: 51
End: 2021-06-10

## 2021-06-10 DIAGNOSIS — G89.29 CHRONIC BILATERAL LOW BACK PAIN, UNSPECIFIED WHETHER SCIATICA PRESENT: Primary | ICD-10-CM

## 2021-06-10 DIAGNOSIS — M79.7 FIBROMYALGIA: ICD-10-CM

## 2021-06-10 DIAGNOSIS — M54.50 CHRONIC BILATERAL LOW BACK PAIN, UNSPECIFIED WHETHER SCIATICA PRESENT: Primary | ICD-10-CM

## 2021-06-10 PROCEDURE — 97530 THERAPEUTIC ACTIVITIES: CPT | Performed by: PHYSICAL THERAPIST

## 2021-06-10 PROCEDURE — 97140 MANUAL THERAPY 1/> REGIONS: CPT | Performed by: PHYSICAL THERAPIST

## 2021-06-10 PROCEDURE — 97110 THERAPEUTIC EXERCISES: CPT | Performed by: PHYSICAL THERAPIST

## 2021-06-10 NOTE — PROGRESS NOTES
"Re-Assessment / Re-Certification        Patient: Eugenie Rapp   : 1970  Diagnosis/ICD-10 Code:  Chronic bilateral low back pain, unspecified whether sciatica present [M54.5, G89.29]  Referring practitioner: Yudelka Hart MD  Date of Initial Visit: Type: THERAPY  Noted: 2021  Today's Date: 6/10/2021  Patient seen for 4 sessions      Subjective:     Subjective Questionnaire: Oswestry: 44%  Clinical Progress: improved  Home Program Compliance: Yes partial  Treatment has included: therapeutic exercise, manual therapy and therapeutic activity    Subjective Evaluation    History of Present Illness    Subjective comment: Patient notes that she hasn't been \"super compliant\" this time.  She has done nerve gliding more often than the others because it is in sitting.  She notes that she is \"okay\" per initial complaints but she is having \"other pain complaints\" that have been an issue.   She notes less leg pain more recently but more axial pain.  She did drive to [place] earlier in the week and she did \"okay\".  Pain  Current pain ratin  At best pain ratin  At worst pain ratin         Objective          Postural Observations    Additional Postural Observation Details  Flat back appearance, forward head and shoulders    Palpation     Additional Palpation Details  Tender to PA pressures over the middle to lower lumbar and sacral segments with normal mobility.  TTP with PA pressures over the low to mid thoracic segments. TTP over bilateral piriformis and lateral hip complex.  Mild to moderate posterior thigh tenderness.      Neurological Testing     Sensation     Lumbar   Left   Intact: light touch    Right   Intact: light touch    Reflexes   Left   Patellar (L4): normal (2+)  Achilles (S1): normal (2+)  Clonus sign: negative    Right   Patellar (L4): trace (1+)  Achilles (S1): normal (2+)  Clonus sign: negative    Additional Neurological Details  2+ in UE reflexes as well    Active Range of Motion "     Lumbar   Flexion: 100 degrees WFL  Extension: 45 degrees WFL and with pain    Strength/Myotome Testing     Left Hip   Planes of Motion   Flexion: 4+  Extension: 4  Abduction: 4    Right Hip   Planes of Motion   Flexion: 4+  Extension: 4  Abduction: 4    Left Knee   Flexion: 5  Extension: 5    Right Knee   Flexion: 5  Extension: 5    Left Ankle/Foot   Dorsiflexion: 5  Great toe extension: 5    Right Ankle/Foot   Dorsiflexion: 5  Great toe extension: 5    Tests     Lumbar   Negative repeated extension.     Left   Negative passive SLR and repeated side glide.     Right   Negative passive SLR and repeated side glide.     Left Pelvic Girdle/Sacrum   Positive: sacral spring.   Negative: thigh thrust.     Right Pelvic Girdle/Sacrum   Positive: sacral spring.   Negative: thigh thrust.     Ambulation     Comments   Forward head and shoulders decreased trunk rotation and arm swing.      Assessment & Plan     Assessment  Impairments: abnormal gait, abnormal or restricted ROM, activity intolerance, impaired balance, impaired physical strength, lacks appropriate home exercise program, pain with function and weight-bearing intolerance  Assessment details: Patient ha attended one month of PT.  She has overall fair to poor HEP compliance but has reduced instances of LE symptoms and has improved her ability to drive and sit for prolonged periods.  She still has palpable irritability and pain with PA pressures over the lumbosacral and thoracic regions of the spine.  She has responded favorably to initial PT interventions but would like to improve overall compliance for a long term management program. Will continue PT interventions for at least one more month.  Prognosis: good  Functional Limitations: carrying objects, lifting, walking, pulling, pushing, uncomfortable because of pain, sitting, standing, stooping and unable to perform repetitive tasks  Goals  Plan Goals: Short Term Goals (2 weeks)  1. Patient to be compliant with  initial HEP. Partially MET  2.  Patient to report pain less than or equal to 3/10 ongoing  3.  Patient to report decreased pain with prolonged standing MET    Long Term Goals (6 weeks)    1. Patient to demonstrate decreased reactivity with passive intervertebral motion assessment posterior to anterior along the mid to lower lumbar segments. ONGOING  2. Patient to demonstrate independence with home exercises. ONGOING  3. Patient to drive at least one hour without exacerbated symptoms. MET  4. Patient to improve Modified Oswestry score to at least 20%. NOT MET      Plan  Therapy options: will be seen for skilled physical therapy services  Planned modality interventions: electrical stimulation/Russian stimulation, TENS, ultrasound, dry needling and thermotherapy (hydrocollator packs)  Planned therapy interventions: manual therapy, therapeutic activities, postural training, body mechanics training, functional ROM exercises, flexibility, gait training, stretching, strengthening, joint mobilization, neuromuscular re-education, soft tissue mobilization and spinal/joint mobilization  Frequency: 1x week  Duration in visits: 6  Treatment plan discussed with: patient  Plan details: Progression of HEP, core and hip strengthening, neural dynamics, manual and modalities as indicated.      Progress toward previous goals: Partially Met        Timeframe: 6 weeks  Prognosis to achieve goals: fair    PT Signature: Yo Trejo, PT      Based upon review of the patient's progress and continued therapy plan, it is my medical opinion that Eugenie Rapp should continue physical therapy treatment at [facility]  [address] [place] [id number] [phone].    Signature: __________________________________  Yudelka Hart MD    Timed:  Manual Therapy:    15     mins  79700;  Therapeutic Exercise:    13     mins  37600;     Neuromuscular Allison:    0    mins  82631;    Therapeutic  Activity:     10     mins  43461;     Gait Trainin     mins  17691;     Ultrasound:     0     mins  80288;    Electrical Stimulation:    0     mins  40071 ( );    Untimed:  Electrical Stimulation:    0     mins  66715 ( );  Mechanical Traction:    0     mins  03545;     Timed Treatment:   38   mins   Total Treatment:     38   mins

## 2021-06-21 DIAGNOSIS — M79.7 FIBROMYALGIA: ICD-10-CM

## 2021-06-22 ENCOUNTER — TREATMENT (OUTPATIENT)
Dept: PHYSICAL THERAPY | Facility: CLINIC | Age: 51
End: 2021-06-22

## 2021-06-22 DIAGNOSIS — G89.29 CHRONIC BILATERAL LOW BACK PAIN, UNSPECIFIED WHETHER SCIATICA PRESENT: Primary | ICD-10-CM

## 2021-06-22 DIAGNOSIS — M54.50 CHRONIC BILATERAL LOW BACK PAIN, UNSPECIFIED WHETHER SCIATICA PRESENT: Primary | ICD-10-CM

## 2021-06-22 DIAGNOSIS — M79.7 FIBROMYALGIA: ICD-10-CM

## 2021-06-22 PROCEDURE — 97140 MANUAL THERAPY 1/> REGIONS: CPT | Performed by: PHYSICAL THERAPIST

## 2021-06-22 PROCEDURE — 97110 THERAPEUTIC EXERCISES: CPT | Performed by: PHYSICAL THERAPIST

## 2021-06-22 RX ORDER — GABAPENTIN 600 MG/1
TABLET ORAL
Qty: 270 TABLET | Refills: 0 | Status: SHIPPED | OUTPATIENT
Start: 2021-06-22 | End: 2021-09-13

## 2021-06-22 NOTE — PROGRESS NOTES
Physical Therapy Daily Progress Note        Patient: Eugenie Rapp   : 1970  Diagnosis/ICD-10 Code:  Chronic bilateral low back pain, unspecified whether sciatica present [M54.5, G89.29]  Referring practitioner: Yudelka Hart MD  Date of Initial Visit: Type: THERAPY  Noted: 2021  Today's Date: 2021  Patient seen for 5 sessions         Patient reports that she travelled to [place] last week.  She notes that the first day she had to go up and down steps on a tour and she noted increased pain, particularly in the ankle with repetitive eversion moments in regards to the left ankle. Her pain level is 4/10 upon arrival.          Objective   Minimal posterior tib tenderness.  Bilateral Achilles tenderness R>L.  See Exercise, Manual, and Modality Logs for complete treatment.       Assessment & Plan     Assessment  Assessment details: Patient again reports minimal overall HEP compliance.  Her energy was lower and her pain was higher today.  We started eccentrics for her bilateral Achilles pain and continued manual heavy approach.    Plan  Plan details: Attempt to increase activity level in clinic.              Timed:  Manual Therapy:    26     mins  31357;  Therapeutic Exercise:    15     mins  88459;     Neuromuscular Allison:    0    mins  13465;    Therapeutic Activity:     0     mins  68106;     Gait Trainin     mins  49122;     Ultrasound:     0     mins  73301;    Electrical Stimulation:    0     mins  61087 ( );    Untimed:  Electrical Stimulation:    0     mins  83475 ( );  Mechanical Traction:    0     mins  62011;     Timed Treatment:   41   mins   Total Treatment:     41   mins    Yo Trejo, PT  Physical Therapist

## 2021-06-23 RX ORDER — MELOXICAM 15 MG/1
TABLET ORAL
Qty: 90 TABLET | Refills: 0 | Status: SHIPPED | OUTPATIENT
Start: 2021-06-23 | End: 2021-09-14

## 2021-06-29 ENCOUNTER — TREATMENT (OUTPATIENT)
Dept: PHYSICAL THERAPY | Facility: CLINIC | Age: 51
End: 2021-06-29

## 2021-06-29 DIAGNOSIS — G89.29 CHRONIC BILATERAL LOW BACK PAIN, UNSPECIFIED WHETHER SCIATICA PRESENT: Primary | ICD-10-CM

## 2021-06-29 DIAGNOSIS — M79.7 FIBROMYALGIA: ICD-10-CM

## 2021-06-29 DIAGNOSIS — M54.50 CHRONIC BILATERAL LOW BACK PAIN, UNSPECIFIED WHETHER SCIATICA PRESENT: Primary | ICD-10-CM

## 2021-06-29 PROCEDURE — 97110 THERAPEUTIC EXERCISES: CPT | Performed by: PHYSICAL THERAPIST

## 2021-06-29 PROCEDURE — 97140 MANUAL THERAPY 1/> REGIONS: CPT | Performed by: PHYSICAL THERAPIST

## 2021-06-29 NOTE — PROGRESS NOTES
Physical Therapy Daily Progress Note        Patient: Eugenie Rapp   : 1970  Diagnosis/ICD-10 Code:  Chronic bilateral low back pain, unspecified whether sciatica present [M54.5, G89.29]  Referring practitioner: No ref. provider found  Date of Initial Visit: Type: THERAPY  Noted: 2021  Today's Date: 2021  Patient seen for 6 sessions         Patient reports that she feels a little better today.  She has worked on the exercises a little more this time and her pain along the ankles are better.  She notes that she is travelling this weekend, driving several hours. Her pain level is 4/10 upon arrival.          Objective   TTP along L>R gluteal region.    See Exercise, Manual, and Modality Logs for complete treatment.       Assessment & Plan     Assessment  Assessment details: Patient seems to be improving as she is further away from her last trip and vacation.  There is some concern over increasing pain over the next week with additional travels.      Plan  Plan details: Reassessment              Timed:  Manual Therapy:    27     mins  02928;  Therapeutic Exercise:    13     mins  84292;     Neuromuscular Allison:    0    mins  69748;    Therapeutic Activity:     0     mins  46270;     Gait Trainin     mins  72065;     Ultrasound:     0     mins  06202;    Electrical Stimulation:    0     mins  10761 ( );    Untimed:  Electrical Stimulation:    0     mins  78358 ( );  Mechanical Traction:    0     mins  46014;     Timed Treatment:   40   mins   Total Treatment:     40   mins    Yo Trejo, PT  Physical Therapist

## 2021-07-07 ENCOUNTER — TREATMENT (OUTPATIENT)
Dept: PHYSICAL THERAPY | Facility: CLINIC | Age: 51
End: 2021-07-07

## 2021-07-07 DIAGNOSIS — M79.7 FIBROMYALGIA: ICD-10-CM

## 2021-07-07 DIAGNOSIS — G89.29 CHRONIC BILATERAL LOW BACK PAIN, UNSPECIFIED WHETHER SCIATICA PRESENT: Primary | ICD-10-CM

## 2021-07-07 DIAGNOSIS — M54.50 CHRONIC BILATERAL LOW BACK PAIN, UNSPECIFIED WHETHER SCIATICA PRESENT: Primary | ICD-10-CM

## 2021-07-07 PROCEDURE — 97140 MANUAL THERAPY 1/> REGIONS: CPT | Performed by: PHYSICAL THERAPIST

## 2021-07-07 PROCEDURE — 97530 THERAPEUTIC ACTIVITIES: CPT | Performed by: PHYSICAL THERAPIST

## 2021-07-07 NOTE — PROGRESS NOTES
Re-Assessment / Re-Certification        Patient: Eugenie Rapp   : 1970  Diagnosis/ICD-10 Code:  Chronic bilateral low back pain, unspecified whether sciatica present [M54.5, G89.29]  Referring practitioner: Yudelka Hart MD  Date of Initial Visit: Type: THERAPY  Noted: 2021  Today's Date: 2021  Patient seen for 7 sessions      Subjective:     Subjective Questionnaire: Oswestry: 38%  Clinical Progress: improved  Home Program Compliance: Yes  Treatment has included: therapeutic exercise, neuromuscular re-education, manual therapy and therapeutic activity    Subjective Evaluation    History of Present Illness    Subjective comment: Patient notes [place] to [place] and [place].  She walked 5 miles in [place] and 4 miles the next day.  Noted that she needed to take Tramadol to help her but she did well.  She notes that her AChilles and knees hurt her but her back also hurt.  She broke up her drive by stopping to go for walks. Pain  Current pain ratin         Objective          Postural Observations    Additional Postural Observation Details  Flat back appearance, forward head and shoulders    Palpation     Additional Palpation Details  Mild tenderness in the lower lumbar levels with PA and L/RAYMON pressures.  TTP over the right insertional achilles region and the left MTJ region.  Generalized posterior knee tenderness bilaterally.    Neurological Testing     Sensation     Lumbar   Left   Intact: light touch    Right   Intact: light touch    Reflexes   Left   Patellar (L4): normal (2+)  Achilles (S1): normal (2+)  Clonus sign: negative    Right   Patellar (L4): trace (1+)  Achilles (S1): normal (2+)  Clonus sign: negative    Additional Neurological Details  2+ in UE reflexes as well    Active Range of Motion     Lumbar   Flexion: 100 degrees WFL  Extension: 45 degrees WFL and with pain    Strength/Myotome Testing     Left Hip   Planes of Motion   Flexion: 4+  Extension:  4  Abduction: 4    Right Hip   Planes of Motion   Flexion: 4+  Extension: 4  Abduction: 4    Left Knee   Flexion: 5  Extension: 5    Right Knee   Flexion: 5  Extension: 5    Left Ankle/Foot   Dorsiflexion: 5  Great toe extension: 5    Right Ankle/Foot   Dorsiflexion: 5  Great toe extension: 5    Tests     Lumbar   Negative repeated extension.     Left   Negative passive SLR and repeated side glide.     Right   Negative passive SLR and repeated side glide.     Left Pelvic Girdle/Sacrum   Positive: sacral spring.   Negative: thigh thrust.     Right Pelvic Girdle/Sacrum   Positive: sacral spring.   Negative: thigh thrust.     Ambulation     Comments   Forward head and shoulders decreased trunk rotation and arm swing.      Assessment & Plan     Assessment  Impairments: abnormal gait, abnormal or restricted ROM, activity intolerance, impaired balance, impaired physical strength, lacks appropriate home exercise program, pain with function and weight-bearing intolerance  Assessment details: Patient has now attended around 2 months of PT.  Her HEP compliance is better than the previous month and her overall activity tolerance seems to be improving.  She still has a long standing history of hypermobility and fibromyalgia that are barriers but she seems to be improving her confidence and is beginning to increase participation in more recreational activities.  At this point, she is appropriate to reduce in-clinic frequency but will benefit from continued education, strengthening, and manual techniques.  Will continue at every other week for 1-2 additional months for progression.  Prognosis: good  Functional Limitations: carrying objects, lifting, walking, pulling, pushing, uncomfortable because of pain, sitting, standing, stooping and unable to perform repetitive tasks  Goals  Plan Goals: Short Term Goals (2 weeks)  1. Patient to be compliant with initial HEP. Partially MET  2.  Patient to report pain less than or equal to  3/10 ONGOING  3.  Patient to report decreased pain with prolonged standing MET    Long Term Goals (6 weeks)    1. Patient to demonstrate decreased reactivity with passive intervertebral motion assessment posterior to anterior along the mid to lower lumbar segments. ONGOING  2. Patient to demonstrate independence with home exercises. ONGOING  3. Patient to drive at least one hour without exacerbated symptoms. MET  4. Patient to improve Modified Oswestry score to at least 20%. NOT MET    NEW GOALS:  5. Patient to resume recreational hiking less than 2 miles without a significant pain increase by one month.  6. Patient to transition to I HEP and recreational exercise by 2 months.      Plan  Therapy options: will be seen for skilled physical therapy services  Planned modality interventions: electrical stimulation/Russian stimulation, TENS, ultrasound, dry needling and thermotherapy (hydrocollator packs)  Planned therapy interventions: manual therapy, therapeutic activities, postural training, body mechanics training, functional ROM exercises, flexibility, gait training, stretching, strengthening, joint mobilization, neuromuscular re-education, soft tissue mobilization and spinal/joint mobilization  Frequency: 1x week  Duration in visits: 6  Duration in weeks: 8  Treatment plan discussed with: patient  Plan details: Progression of HEP, core and hip strengthening, neural dynamics, manual and modalities as indicated.      Progress toward previous goals: Partially Met          Timeframe: 2 months  Prognosis to achieve goals: fair    PT Signature: Yo Trejo, PT      Based upon review of the patient's progress and continued therapy plan, it is my medical opinion that Eugenie Rapp should continue physical therapy treatment at [facility]  [address] [place] [id number] [phone].    Signature: __________________________________  Yudelka Hart,  MD    Timed:  Manual Therapy:    25     mins  87130;  Therapeutic Exercise:    0     mins  08190;     Neuromuscular Allison:    0    mins  38306;    Therapeutic Activity:     15     mins  63354;     Gait Trainin     mins  53263;     Ultrasound:     0     mins  97071;    Electrical Stimulation:    0     mins  42984 ( );    Untimed:  Electrical Stimulation:    0     mins  74849 ( );  Mechanical Traction:    0     mins  46440;     Timed Treatment:   40   mins   Total Treatment:     40   mins

## 2021-07-14 DIAGNOSIS — G89.29 CHRONIC LOW BACK PAIN, UNSPECIFIED BACK PAIN LATERALITY, UNSPECIFIED WHETHER SCIATICA PRESENT: Primary | ICD-10-CM

## 2021-07-14 DIAGNOSIS — M54.41 ACUTE BILATERAL LOW BACK PAIN WITH BILATERAL SCIATICA: ICD-10-CM

## 2021-07-14 DIAGNOSIS — M54.42 ACUTE BILATERAL LOW BACK PAIN WITH BILATERAL SCIATICA: ICD-10-CM

## 2021-07-14 DIAGNOSIS — M54.50 CHRONIC LOW BACK PAIN, UNSPECIFIED BACK PAIN LATERALITY, UNSPECIFIED WHETHER SCIATICA PRESENT: Primary | ICD-10-CM

## 2021-07-16 RX ORDER — TRAMADOL HYDROCHLORIDE 50 MG/1
TABLET ORAL
Qty: 30 TABLET | Refills: 0 | Status: SHIPPED | OUTPATIENT
Start: 2021-07-16 | End: 2022-12-19

## 2021-07-22 ENCOUNTER — TREATMENT (OUTPATIENT)
Dept: PHYSICAL THERAPY | Facility: CLINIC | Age: 51
End: 2021-07-22

## 2021-07-22 DIAGNOSIS — G89.29 CHRONIC BILATERAL LOW BACK PAIN, UNSPECIFIED WHETHER SCIATICA PRESENT: Primary | ICD-10-CM

## 2021-07-22 DIAGNOSIS — M79.7 FIBROMYALGIA: ICD-10-CM

## 2021-07-22 DIAGNOSIS — M54.50 CHRONIC BILATERAL LOW BACK PAIN, UNSPECIFIED WHETHER SCIATICA PRESENT: Primary | ICD-10-CM

## 2021-07-22 PROCEDURE — 97110 THERAPEUTIC EXERCISES: CPT | Performed by: PHYSICAL THERAPIST

## 2021-07-22 PROCEDURE — 97140 MANUAL THERAPY 1/> REGIONS: CPT | Performed by: PHYSICAL THERAPIST

## 2021-07-22 NOTE — PROGRESS NOTES
"Physical Therapy Daily Progress Note        Patient: Eugenie Rapp   : 1970  Diagnosis/ICD-10 Code:  Chronic bilateral low back pain, unspecified whether sciatica present [M54.5, G89.29]  Referring practitioner: Yudelka Hart MD  Date of Initial Visit: Type: THERAPY  Noted: 2021  Today's Date: 2021  Patient seen for 8 sessions         Patient reports that she had been doing better but she walked with her  and dog about a mile last week and she had increased central upper lumbar back pain with radiation into the right hip and groin afterwards.  She reports pain increased fairly significantly and she found it difficult to get out of her recliner.  The pain dissipated over the course of a few days.  She also notes that she had trouble \"bridging\" to move around in bed at the time. Her pain level is 4/10 upon arrival.          Objective   See Exercise, Manual, and Modality Logs for complete treatment.       Assessment & Plan     Assessment  Assessment details: Patient is discouraged because of increased pain last week, but was making progress towards increased participation in more recreational activities.   Taught her appropriate sequencing with a bridge today for transfers and to perform standing extensions as a rescue next time her back pain increases.  Advised to attempt a hike between now and her next session.    Plan  Plan details: Progress neuraldynamics.                Timed:  Manual Therapy:    25     mins  46476;  Therapeutic Exercise:    11     mins  82658;     Neuromuscular Allison:    0    mins  73163;    Therapeutic Activity:     0     mins  41968;     Gait Trainin     mins  86456;     Ultrasound:     0     mins  52429;    Electrical Stimulation:    0     mins  52891 ( );    Untimed:  Electrical Stimulation:    0     mins  81203 ( );  Mechanical Traction:    0     mins  49165;     Timed Treatment:   36   mins   Total Treatment:     36   mins    Yo ALEJO " Neil, PT  Physical Therapist

## 2021-07-29 RX ORDER — LIDOCAINE 50 MG/G
PATCH TOPICAL
Qty: 30 PATCH | Refills: 6 | Status: SHIPPED | OUTPATIENT
Start: 2021-07-29 | End: 2023-02-20 | Stop reason: SDUPTHER

## 2021-08-05 ENCOUNTER — TREATMENT (OUTPATIENT)
Dept: PHYSICAL THERAPY | Facility: CLINIC | Age: 51
End: 2021-08-05

## 2021-08-05 DIAGNOSIS — M79.7 FIBROMYALGIA: ICD-10-CM

## 2021-08-05 DIAGNOSIS — G89.29 CHRONIC BILATERAL LOW BACK PAIN, UNSPECIFIED WHETHER SCIATICA PRESENT: Primary | ICD-10-CM

## 2021-08-05 DIAGNOSIS — M54.50 CHRONIC BILATERAL LOW BACK PAIN, UNSPECIFIED WHETHER SCIATICA PRESENT: Primary | ICD-10-CM

## 2021-08-05 PROCEDURE — 97530 THERAPEUTIC ACTIVITIES: CPT | Performed by: PHYSICAL THERAPIST

## 2021-08-05 PROCEDURE — 97140 MANUAL THERAPY 1/> REGIONS: CPT | Performed by: PHYSICAL THERAPIST

## 2021-08-05 NOTE — PROGRESS NOTES
Physical Therapy Daily Progress Note        Patient: Eugenie Rapp   : 1970  Diagnosis/ICD-10 Code:  Chronic bilateral low back pain, unspecified whether sciatica present [M54.5, G89.29]  Referring practitioner: Yudelka Hart MD  Date of Initial Visit: Type: THERAPY  Noted: 2021  Today's Date: 2021  Patient seen for 9 sessions         Patient reports she was able to hike without a lot of issues since last session.  Her Achilles are continuing to bother her intermittently.  She still struggles to go downhill at times.  She has not been HEP compliant. Her pain level is 3/10 upon arrival.          Objective   No Darlin at the Achilles, mild tenderness.  Moderate gluteal tenderness, no pain with CPA of the lumbar spine.    See Exercise, Manual, and Modality Logs for complete treatment.       Assessment & Plan     Assessment  Assessment details: Patient has met her therapy potential at this time.  Her HEP compliance has been poor but she has improved her overall awareness of her condition and is making a lot of positive lifestyle changes that seems to have improved her overall condition and pain.  She will benefit from continued hiking and enjoyable active recreational activities but the need for skilled PT services has now been exhausted.    Goals  Plan Goals: Plan Goals: Short Term Goals (2 weeks)  1. Patient to be compliant with initial HEP. Partially MET  2.  Patient to report pain less than or equal to 3/10 MET  3.  Patient to report decreased pain with prolonged standing MET    Long Term Goals (6 weeks)    1. Patient to demonstrate decreased reactivity with passive intervertebral motion assessment posterior to anterior along the mid to lower lumbar segments. MET  2. Patient to demonstrate independence with home exercises. ONGOING  3. Patient to drive at least one hour without exacerbated symptoms. MET  4. Patient to improve Modified Oswestry score to at least 20%. NOT MET    Plan  Plan  details: D/C to I HEP.                  Timed:  Manual Therapy:    25     mins  27183;  Therapeutic Exercise:    0     mins  48272;     Neuromuscular Allison:    0    mins  93450;    Therapeutic Activity:     10     mins  51772;     Gait Trainin     mins  79878;     Ultrasound:     0     mins  02700;    Electrical Stimulation:    0     mins  62844 ( );    Untimed:  Electrical Stimulation:    0     mins  85109 ( );  Mechanical Traction:    0     mins  28799;     Timed Treatment:   35   mins   Total Treatment:     35   mins    Yo Trejo, PT  Physical Therapist

## 2021-09-13 DIAGNOSIS — M79.7 FIBROMYALGIA: ICD-10-CM

## 2021-09-13 NOTE — TELEPHONE ENCOUNTER
Rx Refill Note  Requested Prescriptions     Pending Prescriptions Disp Refills   • gabapentin (NEURONTIN) 600 MG tablet [Pharmacy Med Name: GABAPENTIN 600 MG TABLET] 270 tablet      Sig: TAKE ONE TABLET BY MOUTH THREE TIMES A DAY   • meloxicam (MOBIC) 15 MG tablet [Pharmacy Med Name: MELOXICAM 15 MG TABLET] 90 tablet 0     Sig: TAKE ONE TABLET BY MOUTH DAILY AS NEEDED FOR MODERATE PAIN      Last office visit with prescribing clinician: 3/12/2021      Next office visit with prescribing clinician: Visit date not found       {TIP  Please add Last Relevant Lab Date if appropriate: 3/4/21    Dread Hernadez MA  09/13/21, 11:13 EDT

## 2021-09-14 RX ORDER — GABAPENTIN 600 MG/1
TABLET ORAL
Qty: 270 TABLET | Refills: 0 | Status: SHIPPED | OUTPATIENT
Start: 2021-09-14 | End: 2021-12-17

## 2021-09-14 RX ORDER — MELOXICAM 15 MG/1
TABLET ORAL
Qty: 90 TABLET | Refills: 0 | Status: SHIPPED | OUTPATIENT
Start: 2021-09-14 | End: 2021-12-07

## 2021-09-29 ENCOUNTER — TRANSCRIBE ORDERS (OUTPATIENT)
Dept: PHYSICAL THERAPY | Facility: CLINIC | Age: 51
End: 2021-09-29

## 2021-09-29 ENCOUNTER — TREATMENT (OUTPATIENT)
Dept: PHYSICAL THERAPY | Facility: CLINIC | Age: 51
End: 2021-09-29

## 2021-09-29 DIAGNOSIS — M25.511 CHRONIC PAIN OF BOTH SHOULDERS: Primary | ICD-10-CM

## 2021-09-29 DIAGNOSIS — G89.29 CHRONIC PAIN OF BOTH SHOULDERS: Primary | ICD-10-CM

## 2021-09-29 DIAGNOSIS — M25.512 CHRONIC PAIN OF BOTH SHOULDERS: Primary | ICD-10-CM

## 2021-09-29 PROCEDURE — 97110 THERAPEUTIC EXERCISES: CPT | Performed by: PHYSICAL THERAPIST

## 2021-09-29 PROCEDURE — 97162 PT EVAL MOD COMPLEX 30 MIN: CPT | Performed by: PHYSICAL THERAPIST

## 2021-09-29 NOTE — PROGRESS NOTES
"Physical Therapy Initial Evaluation and Plan of Care        Patient: Eugenie Rapp   : 1970  Diagnosis/ICD-10 Code:  Chronic pain of both shoulders [M25.511, G89.29, M25.512]  Referring practitioner: Gadiel Huff MD  Date of Initial Visit: 2021  Today's Date: 2021  Patient seen for 1 sessions           Subjective Questionnaire: QuickDASH: 34.09      Subjective Evaluation    History of Present Illness  Date of onset: 2011  Mechanism of injury: Patient with longstanding history of chronic pain that is well known to the clinic for multiple complaints presents today for bilateral shoulder pain.  She was recommended to begin water aerobics and recreational exercises but she wasn't able to initiate care.  She has not been pleased with her progress with pain management and she feels that she is just going to refill medications and not receiving any true \"pain management\".  She notes she has started CBD powder but doesn't feel like it has helped her pain but she feels she is frustrated.  She notes history of bilateral shoulder pain for at least 10 years.  She notes more limitations from her right shoulder than anything else.  She notes reduced ability to raise the right shoulder overhead and difficulty lifting weighted items above 90 degrees into ABDuction plane.  She had XR that were grossly negative.  She notes pain in the shoulder is along the superior shoulder and is tender to touch \"sharp\".  The left is tender in the same location but doesn't have the ROM limitations.      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    Cervical/Thoracic Screen   Cervical range of motion within normal limits    Active Range of Motion   Left Shoulder   Normal active range of motion    Right Shoulder   Normal active range of motion  Flexion: Right shoulder active forward flexion: end range pain.   Abduction: Right shoulder active abduction: end range pain.     Passive Range of Motion   Left Shoulder   Normal passive range of motion    Right Shoulder   Normal passive range of motion  External rotation 90°: Right shoulder passive external rotation at 90 degrees: excessive.   Internal rotation 90°: Right shoulder passive internal rotation at 90 degrees: excessive.     Strength/Myotome Testing     Left Shoulder     Planes of Motion   Flexion: 4   Abduction: 4   External rotation at 0°: 4+   External rotation at 45°: 4+   External rotation at 90°: 4   Internal rotation at 0°: 5     Isolated Muscles   Biceps: 5     Right Shoulder     Planes of Motion   Flexion: 4   Abduction: 4   External rotation at 0°: 4+   External rotation at 45°: 4   External rotation at 90°: 4   Internal rotation at 0°: 5     Isolated Muscles   Biceps: 5     Tests     Left Shoulder   Positive full can and painful arc.     Right Shoulder   Positive full can, Hawkin's and painful arc.     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  She has had poor HEP compliance in the past and plan to utilize HEP progression conservatively in number of exercises.  Pain affects her in tasks involving reaching into abduction and  flexion.  Functional Limitations: carrying objects, lifting, pulling, pushing, uncomfortable because of pain, reaching overhead and unable to perform repetitive tasks  Goals  Plan Goals: Short Term Goals (2wks)  1. Patient to report bilateral shoulder pain less than or equal to 3/10.  2. Patient will be independent and compliant with initial home exercise program.       Long Term Goals (6wks)    1. Patient to abduct to at least 120 with 1-2# in her hand.  2. Patient to report improved daily pain.  3. Pt. will be independent and compliant with advanced home exercise program to facilitate self-management of symptoms.  4. Patient will improve Quick Dash score to at least 22%.        Plan  Therapy options: will be seen for skilled physical therapy services  Planned modality interventions: TENS, thermotherapy (hydrocollator packs), cryotherapy, ultrasound and dry needling  Planned therapy interventions: manual therapy, strengthening, stretching, postural training, joint mobilization and neuromuscular re-education  Frequency: 1x week  Duration in visits: 6  Treatment plan discussed with: patient        Timed:  Manual Therapy:    0     mins  50762;  Therapeutic Exercise:    10     mins  25206;     Neuromuscular Allison:    0    mins  87211;    Therapeutic Activity:     0     mins  92680;     Gait Trainin     mins  94658;     Ultrasound:     0     mins  53630;    Electrical Stimulation:    0     mins  96039 ( );    Untimed:  Electrical Stimulation:    0     mins  33236 ( );  Mechanical Traction:    0     mins  75019;     Timed Treatment:   10   mins   Total Treatment:     39   mins    PT SIGNATURE: Yo Trejo, PT   DATE TREATMENT INITIATED: 2021    Initial Certification  Certification Period: 2021  I certify that the therapy services are furnished while this patient is under my care.  The services outlined above are required by this patient, and will be reviewed every 90  days.     PHYSICIAN: Gadiel Huff MD      DATE:     Please sign and return via fax to [phone].. Thank you, [facility].

## 2021-09-30 ENCOUNTER — TRANSCRIBE ORDERS (OUTPATIENT)
Dept: ADMINISTRATIVE | Facility: HOSPITAL | Age: 51
End: 2021-09-30

## 2021-09-30 ENCOUNTER — HOSPITAL ENCOUNTER (OUTPATIENT)
Dept: GENERAL RADIOLOGY | Facility: HOSPITAL | Age: 51
Discharge: HOME | End: 2021-09-30
Admitting: PHYSICIAN ASSISTANT

## 2021-09-30 DIAGNOSIS — M25.511 BILATERAL SHOULDER PAIN, UNSPECIFIED CHRONICITY: Primary | ICD-10-CM

## 2021-09-30 DIAGNOSIS — M25.512 BILATERAL SHOULDER PAIN, UNSPECIFIED CHRONICITY: ICD-10-CM

## 2021-09-30 DIAGNOSIS — M25.512 BILATERAL SHOULDER PAIN, UNSPECIFIED CHRONICITY: Primary | ICD-10-CM

## 2021-09-30 DIAGNOSIS — M25.511 BILATERAL SHOULDER PAIN, UNSPECIFIED CHRONICITY: ICD-10-CM

## 2021-09-30 PROCEDURE — 73030 X-RAY EXAM OF SHOULDER: CPT

## 2021-10-06 ENCOUNTER — TREATMENT (OUTPATIENT)
Dept: PHYSICAL THERAPY | Facility: CLINIC | Age: 51
End: 2021-10-06

## 2021-10-06 DIAGNOSIS — G89.29 CHRONIC PAIN OF BOTH SHOULDERS: Primary | ICD-10-CM

## 2021-10-06 DIAGNOSIS — M25.512 CHRONIC PAIN OF BOTH SHOULDERS: Primary | ICD-10-CM

## 2021-10-06 DIAGNOSIS — M25.511 CHRONIC PAIN OF BOTH SHOULDERS: Primary | ICD-10-CM

## 2021-10-06 PROCEDURE — 97110 THERAPEUTIC EXERCISES: CPT | Performed by: PHYSICAL THERAPIST

## 2021-10-06 PROCEDURE — 97140 MANUAL THERAPY 1/> REGIONS: CPT | Performed by: PHYSICAL THERAPIST

## 2021-10-06 NOTE — PROGRESS NOTES
Physical Therapy Daily Progress Note        Patient: Eugenie Rapp   : 1970  Diagnosis/ICD-10 Code:  Chronic pain of both shoulders [M25.511, G89.29, M25.512]  Referring practitioner: PAULA Lyles  Date of Initial Visit: Type: THERAPY  Noted: 2021  Today's Date: 10/6/2021  Patient seen for 2 sessions         Patient reports that she has done her exercies around 4 times, which is more than usual.  She is also on steroids (oral).  She notes improved ROM and overall pain but she has had ulnar nerve irritation during s/l ER with the [facility] due to history of transposition. Her pain level is 4-5/10 upon arrival.          Objective   See Exercise, Manual, and Modality Logs for complete treatment.       Assessment & Plan     Assessment  Assessment details: Patient appears to be improving but she also continues on oral steroids.  She continues to respond well to passive techniques but her HEP compliance is better than anticipated.    Plan  Plan details: Add resistance in Er planes.                  Timed:  Manual Therapy:    25     mins  62481;  Therapeutic Exercise:    10     mins  73192;     Neuromuscular Allison:    0    mins  21671;    Therapeutic Activity:     0     mins  97771;     Gait Trainin     mins  55514;     Ultrasound:     0     mins  61686;    Electrical Stimulation:    0     mins  24682 ( );    Untimed:  Electrical Stimulation:    0     mins  35835 ( );  Mechanical Traction:    0     mins  51337;     Timed Treatment:   35   mins   Total Treatment:     35   mins    Yo Trejo, PT  Physical Therapist    
Community acquired pneumonia

## 2021-10-14 ENCOUNTER — TREATMENT (OUTPATIENT)
Dept: PHYSICAL THERAPY | Facility: CLINIC | Age: 51
End: 2021-10-14

## 2021-10-14 DIAGNOSIS — M54.50 CHRONIC BILATERAL LOW BACK PAIN, UNSPECIFIED WHETHER SCIATICA PRESENT: ICD-10-CM

## 2021-10-14 DIAGNOSIS — M79.7 FIBROMYALGIA: ICD-10-CM

## 2021-10-14 DIAGNOSIS — G89.29 CHRONIC PAIN OF BOTH SHOULDERS: Primary | ICD-10-CM

## 2021-10-14 DIAGNOSIS — G89.29 CHRONIC BILATERAL LOW BACK PAIN, UNSPECIFIED WHETHER SCIATICA PRESENT: ICD-10-CM

## 2021-10-14 DIAGNOSIS — M25.512 CHRONIC PAIN OF BOTH SHOULDERS: Primary | ICD-10-CM

## 2021-10-14 DIAGNOSIS — M25.511 CHRONIC PAIN OF BOTH SHOULDERS: Primary | ICD-10-CM

## 2021-10-14 PROCEDURE — 97140 MANUAL THERAPY 1/> REGIONS: CPT | Performed by: PHYSICAL THERAPIST

## 2021-10-14 PROCEDURE — 97110 THERAPEUTIC EXERCISES: CPT | Performed by: PHYSICAL THERAPIST

## 2021-10-14 NOTE — PROGRESS NOTES
Physical Therapy Daily Progress Note        Patient: Eugenie Rapp   : 1970  Diagnosis/ICD-10 Code:  Chronic pain of both shoulders [M25.511, G89.29, M25.512]  Referring practitioner: PAULA Lyles  Date of Initial Visit: Type: THERAPY  Noted: 2021  Today's Date: 10/14/2021  Patient seen for 3 sessions         Patient reports that her shoulders are moderately better.  She has been doing her exercises 4-5x/wk.  She feels she is still having difficulty with weighted abduction activities.. Her pain level is 2-3/10 upon arrival.          Objective   See Exercise, Manual, and Modality Logs for complete treatment.       Assessment & Plan     Assessment  Assessment details: Patient notes  moderate improvements but she has fatigue related issues after band ER exercises.  Initiated strengthening today but concern over her compliance with this.  Overall encouraged to make progress and have some buy-in to HEP.    Plan  Plan details: Add rows.                  Timed:  Manual Therapy:    24     mins  73930;  Therapeutic Exercise:    15     mins  85360;     Neuromuscular Allison:    0    mins  91396;    Therapeutic Activity:     0     mins  16663;     Gait Trainin     mins  26239;     Ultrasound:     0     mins  43254;    Electrical Stimulation:    0     mins  09797 ( );    Untimed:  Electrical Stimulation:    0     mins  68525 ( );  Mechanical Traction:    0     mins  01619;     Timed Treatment:   39   mins   Total Treatment:     39   mins    Yo Trejo, PT  Physical Therapist

## 2021-10-20 ENCOUNTER — TREATMENT (OUTPATIENT)
Dept: PHYSICAL THERAPY | Facility: CLINIC | Age: 51
End: 2021-10-20

## 2021-10-20 DIAGNOSIS — M79.7 FIBROMYALGIA: ICD-10-CM

## 2021-10-20 DIAGNOSIS — G89.29 CHRONIC PAIN OF BOTH SHOULDERS: Primary | ICD-10-CM

## 2021-10-20 DIAGNOSIS — M25.511 CHRONIC PAIN OF BOTH SHOULDERS: Primary | ICD-10-CM

## 2021-10-20 DIAGNOSIS — M25.512 CHRONIC PAIN OF BOTH SHOULDERS: Primary | ICD-10-CM

## 2021-10-20 PROCEDURE — 97110 THERAPEUTIC EXERCISES: CPT | Performed by: PHYSICAL THERAPIST

## 2021-10-20 PROCEDURE — 97140 MANUAL THERAPY 1/> REGIONS: CPT | Performed by: PHYSICAL THERAPIST

## 2021-10-20 NOTE — PROGRESS NOTES
Physical Therapy Daily Progress Note        Patient: Eugenie Rapp   : 1970  Diagnosis/ICD-10 Code:  Chronic pain of both shoulders [M25.511, G89.29, M25.512]  Referring practitioner: PUALA Lyles  Date of Initial Visit: Type: THERAPY  Noted: 2021  Today's Date: 10/20/2021  Patient seen for 4 sessions         Patient reports that she had a little increased pain following the previous session attributed to external rotation exercises.  She notes she hasn't performed the new exercises at home.  She has a little active pain in the right shoulder and continued left shoulder pain while lying in right sidelying. Her pain level is 4/10 upon arrival.          Objective   See Exercise, Manual, and Modality Logs for complete treatment.       Assessment & Plan     Assessment  Assessment details: Patient continues to experience bilateral shoulder pain with hypermobility.  She has had better HEP compliance than anticipated and she is making some progress.  She becomes very discourage with pain onset and have tried to progress conservatively.    Plan  Plan details: Reassessment.                  Timed:  Manual Therapy:    24     mins  68455;  Therapeutic Exercise:    13     mins  15639;     Neuromuscular Allison:    0    mins  03869;    Therapeutic Activity:     0     mins  74139;     Gait Trainin     mins  88572;     Ultrasound:     0     mins  43733;    Electrical Stimulation:    0     mins  57988 ( );    Untimed:  Electrical Stimulation:    0     mins  83885 ( );  Mechanical Traction:    0     mins  20142;     Timed Treatment:   37   mins   Total Treatment:     37   mins    Yo Trejo, PT  Physical Therapist

## 2021-11-03 ENCOUNTER — TREATMENT (OUTPATIENT)
Dept: PHYSICAL THERAPY | Facility: CLINIC | Age: 51
End: 2021-11-03

## 2021-11-03 DIAGNOSIS — G89.29 CHRONIC PAIN OF BOTH SHOULDERS: Primary | ICD-10-CM

## 2021-11-03 DIAGNOSIS — M79.7 FIBROMYALGIA: ICD-10-CM

## 2021-11-03 DIAGNOSIS — M25.511 CHRONIC PAIN OF BOTH SHOULDERS: Primary | ICD-10-CM

## 2021-11-03 DIAGNOSIS — M25.512 CHRONIC PAIN OF BOTH SHOULDERS: Primary | ICD-10-CM

## 2021-11-03 PROCEDURE — 97530 THERAPEUTIC ACTIVITIES: CPT | Performed by: PHYSICAL THERAPIST

## 2021-11-03 PROCEDURE — 97140 MANUAL THERAPY 1/> REGIONS: CPT | Performed by: PHYSICAL THERAPIST

## 2021-11-03 PROCEDURE — 97110 THERAPEUTIC EXERCISES: CPT | Performed by: PHYSICAL THERAPIST

## 2021-11-03 NOTE — PROGRESS NOTES
Re-Assessment / Re-Certification        Patient: Eugenie Rapp   : 1970  Diagnosis/ICD-10 Code:  Chronic pain of both shoulders [M25.511, G89.29, M25.512]  Referring practitioner: PAULA Lyles  Date of Initial Visit: Type: THERAPY  Noted: 2021  Today's Date: 11/3/2021  Patient seen for 5 sessions      Subjective:     Subjective Questionnaire: QuickDASH: 40.91  Clinical Progress: improved  Home Program Compliance: No  Treatment has included: therapeutic exercise, neuromuscular re-education, manual therapy and therapeutic activity    Subjective Evaluation    History of Present Illness    Subjective comment: Patient notes poor compliance since she was on vacation last week.  She has been attempting exercises but usually only a few times a week.  She feels she needs to do them more and she is convinced they will help her.  She has been struggling with depression at times.Pain  Current pain ratin         Objective          Observations     Additional Shoulder Observation Details  Forward shoulder and head    Palpation     Additional Palpation Details  TTP over the lateral shoulder generally bilaterally.    Cervical/Thoracic Screen   Cervical range of motion within normal limits    Active Range of Motion   Left Shoulder   Normal active range of motion    Right Shoulder   Normal active range of motion  Flexion: Right shoulder active forward flexion: end range pain.   Abduction: Right shoulder active abduction: end range pain.     Passive Range of Motion   Left Shoulder   Normal passive range of motion    Right Shoulder   Normal passive range of motion  External rotation 90°: Right shoulder passive external rotation at 90 degrees: excessive.   Internal rotation 90°: Right shoulder passive internal rotation at 90 degrees: excessive.     Strength/Myotome Testing     Left Shoulder     Planes of Motion   Flexion: 4   Abduction: 4   External rotation at 0°: 4+   External rotation at 45°: 4+    External rotation at 90°: 4   Internal rotation at 0°: 5     Isolated Muscles   Biceps: 5     Right Shoulder     Planes of Motion   Flexion: 4   Abduction: 4   External rotation at 0°: 4+   External rotation at 45°: 4+   External rotation at 90°: 4   Internal rotation at 0°: 5     Isolated Muscles   Biceps: 5     Tests     Left Shoulder   Positive full can, Hawkin's and painful arc.     Right Shoulder   Positive full can.   Negative Hawkin's and painful arc.     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  She has had better than anticipated compliance with exercises but it is still poor overall.  Have tried to keep exercises most direct and specific to encourage compliance.  She generally has felt better when she exercises and have made sure to assure they are pain free. She will benefit from additional care due to persistent issues from the shoulder.  Functional Limitations: carrying objects, lifting, pulling, pushing, uncomfortable because of pain, reaching overhead and unable to perform repetitive tasks  Goals  Plan Goals: Short Term Goals (2wks) ALL ONGOING  1. Patient to report bilateral shoulder pain less than or equal to 3/10.  2. Patient will be independent and compliant with initial home exercise program.       Long Term Goals (6wks)  ALL ONGOING    1. Patient to abduct to at least 120 with 1-2# in her hand.  2. Patient to report improved daily pain.  3. Pt. will be independent and compliant with advanced home exercise program to facilitate self-management of symptoms.  4. Patient will improve Quick Dash score to  at least 22%.        Plan  Therapy options: will be seen for skilled physical therapy services  Planned modality interventions: TENS, thermotherapy (hydrocollator packs), cryotherapy, ultrasound and dry needling  Planned therapy interventions: manual therapy, strengthening, stretching, postural training, joint mobilization and neuromuscular re-education  Frequency: 1x week  Duration in visits: 6  Treatment plan discussed with: patient      Progress toward previous goals: Not Met            Timeframe: 1 month  Prognosis to achieve goals: fair    PT Signature: Yo Trejo, PT      Based upon review of the patient's progress and continued therapy plan, it is my medical opinion that Eugenie Rapp should continue physical therapy treatment at [facility]  [address] [place] [id number] [phone].    Signature: __________________________________  Gonsalo Weaver PA    Timed:  Manual Therapy:    17     mins  78961;  Therapeutic Exercise:    10     mins  49429;     Neuromuscular Allison:    0    mins  06015;    Therapeutic Activity:     10     mins  50777;     Gait Trainin     mins  87493;     Ultrasound:     0     mins  67413;    Electrical Stimulation:    0     mins  71767 ( );    Untimed:  Electrical Stimulation:    0     mins  76563 ( );  Mechanical Traction:    0     mins  75845;     Timed Treatment:   37   mins   Total Treatment:     37   mins

## 2021-11-10 ENCOUNTER — TREATMENT (OUTPATIENT)
Dept: PHYSICAL THERAPY | Facility: CLINIC | Age: 51
End: 2021-11-10

## 2021-11-10 DIAGNOSIS — M25.512 CHRONIC PAIN OF BOTH SHOULDERS: Primary | ICD-10-CM

## 2021-11-10 DIAGNOSIS — G89.29 CHRONIC PAIN OF BOTH SHOULDERS: Primary | ICD-10-CM

## 2021-11-10 DIAGNOSIS — M79.7 FIBROMYALGIA: ICD-10-CM

## 2021-11-10 DIAGNOSIS — M25.511 CHRONIC PAIN OF BOTH SHOULDERS: Primary | ICD-10-CM

## 2021-11-10 PROCEDURE — 97110 THERAPEUTIC EXERCISES: CPT | Performed by: PHYSICAL THERAPIST

## 2021-11-10 PROCEDURE — 97140 MANUAL THERAPY 1/> REGIONS: CPT | Performed by: PHYSICAL THERAPIST

## 2021-11-10 NOTE — PROGRESS NOTES
"Physical Therapy Daily Progress Note        Patient: Eugenie Rapp   : 1970  Diagnosis/ICD-10 Code:  Chronic pain of both shoulders [M25.511, G89.29, M25.512]  Referring practitioner: PAULA Lyles  Date of Initial Visit: Type: THERAPY  Noted: 2021  Today's Date: 11/10/2021  Patient seen for 6 sessions         Patient reports that she had to unexpectedly clean a bathroom over the weekend and her shoulders and knees \"are mad\".  She notes that she has not been compliant with her exercises.  Sometimes, she feels like she is wasting time coming to PT because she doesn't do the exercises. Her pain level is 6-7/10 upon arrival.          Objective   TTP over the anterior deltoid, proximal biceps.    See Exercise, Manual, and Modality Logs for complete treatment.       Assessment & Plan     Assessment  Assessment details: Patient continues to have HEP compliance issue.  There are a multitude of factors that are barriers including fear avoidance behaviors.  She seems to benefit from exercises, when she is compliant, but pain continues otherwise.    Plan  Plan details: Add CKC exercises.                  Timed:  Manual Therapy:    25     mins  43324;  Therapeutic Exercise:     13    mins  63774;     Neuromuscular Allison:    0    mins  35514;    Therapeutic Activity:     0     mins  47657;     Gait Trainin     mins  72980;     Ultrasound:     0     mins  28359;    Electrical Stimulation:    0     mins  20148 ( );    Untimed:  Electrical Stimulation:    0     mins  27083 ( );  Mechanical Traction:    0     mins  37533;     Timed Treatment:   38   mins   Total Treatment:     38   mins    Yo Trejo, PT  Physical Therapist    "

## 2021-11-17 ENCOUNTER — TREATMENT (OUTPATIENT)
Dept: PHYSICAL THERAPY | Facility: CLINIC | Age: 51
End: 2021-11-17

## 2021-11-17 DIAGNOSIS — G89.29 CHRONIC PAIN OF BOTH SHOULDERS: Primary | ICD-10-CM

## 2021-11-17 DIAGNOSIS — M79.7 FIBROMYALGIA: ICD-10-CM

## 2021-11-17 DIAGNOSIS — M25.512 CHRONIC PAIN OF BOTH SHOULDERS: Primary | ICD-10-CM

## 2021-11-17 DIAGNOSIS — M25.511 CHRONIC PAIN OF BOTH SHOULDERS: Primary | ICD-10-CM

## 2021-11-17 PROCEDURE — 97140 MANUAL THERAPY 1/> REGIONS: CPT | Performed by: PHYSICAL THERAPIST

## 2021-11-17 PROCEDURE — 97110 THERAPEUTIC EXERCISES: CPT | Performed by: PHYSICAL THERAPIST

## 2021-11-17 NOTE — PROGRESS NOTES
Physical Therapy Daily Treatment Note      Patient: Eugenie Rapp   : 1970  Referring practitioner: PAULA Lyles  Date of Initial Visit: Type: THERAPY  Noted: 2021  Today's Date: 2021  Patient seen for 7 sessions       Visit Diagnoses:    ICD-10-CM ICD-9-CM   1. Chronic pain of both shoulders  M25.511 719.41    G89.29 338.29    M25.512    2. Fibromyalgia  M79.7 729.1       Subjective   Patient reports that she is having a little more widespread pain today.  She has not been HEP compliant and this frustrates her.  She is discouraged due to inability to fit exercise into her routine.  She feels like she has increased lower back pain from driving a lot this week.  She received her COVID booster earlier today in the [facility], it is not sore at this time.  her pain level is 6/10 upon arrival.      Objective   See Exercise, Manual, and Modality Logs for complete treatment.       Assessment & Plan     Assessment  Assessment details: Patient's lack of compliance has been a consistent barrier.  We reviewed ways to improve this including setting a reminder in her phone or placing a reminder in a place she frequently passes. She recognizes the importance of exercises and believes they will help.  Did not advance HEP due to this.    Plan  Plan details: Follow up in 2 weeks, encouraged continued compliance.  Reassessment.          Timed:         Manual Therapy:    25     mins  84510;     Therapeutic Exercise:    15     mins  42810;     Neuromuscular Allison:    0    mins  77698;    Therapeutic Activity:     0     mins  96565;     Gait Trainin     mins  86181;     Ultrasound:     0     mins  93534;    Ionto                               0    mins   61730  Self Care                       0     mins   55136  Canalith Repos    0     mins 93250      Un-Timed:  Electrical Stimulation:    0     mins  63325 ( );  Dry Needling     0     mins self-pay  Traction     0     mins 20681      Timed  Treatment:   40   mins   Total Treatment:     40   mins    Yo Trejo, PT  [place] License: [id number]

## 2021-11-29 ENCOUNTER — TREATMENT (OUTPATIENT)
Dept: PHYSICAL THERAPY | Facility: CLINIC | Age: 51
End: 2021-11-29

## 2021-11-29 DIAGNOSIS — M79.7 FIBROMYALGIA: ICD-10-CM

## 2021-11-29 DIAGNOSIS — M25.511 CHRONIC PAIN OF BOTH SHOULDERS: Primary | ICD-10-CM

## 2021-11-29 DIAGNOSIS — M25.512 CHRONIC PAIN OF BOTH SHOULDERS: Primary | ICD-10-CM

## 2021-11-29 DIAGNOSIS — G89.29 CHRONIC BILATERAL LOW BACK PAIN, UNSPECIFIED WHETHER SCIATICA PRESENT: ICD-10-CM

## 2021-11-29 DIAGNOSIS — G89.29 CHRONIC PAIN OF BOTH SHOULDERS: Primary | ICD-10-CM

## 2021-11-29 DIAGNOSIS — M54.50 CHRONIC BILATERAL LOW BACK PAIN, UNSPECIFIED WHETHER SCIATICA PRESENT: ICD-10-CM

## 2021-11-29 PROCEDURE — 97110 THERAPEUTIC EXERCISES: CPT | Performed by: PHYSICAL THERAPIST

## 2021-11-29 PROCEDURE — 97140 MANUAL THERAPY 1/> REGIONS: CPT | Performed by: PHYSICAL THERAPIST

## 2021-11-29 PROCEDURE — 97530 THERAPEUTIC ACTIVITIES: CPT | Performed by: PHYSICAL THERAPIST

## 2021-11-29 NOTE — PROGRESS NOTES
"Physical Therapy Re Certification Of Plan of Care  Patient: Eugenie Rapp   : 1970  Diagnosis/ICD-10 Code:  Chronic pain of both shoulders [M25.511, G89.29, M25.512]  Referring practitioner: PAULA Lyles  Date of Initial Visit: 2021  Today's Date: 2021  Patient seen for 8 sessions         Visit Diagnoses:    ICD-10-CM ICD-9-CM   1. Chronic pain of both shoulders  M25.511 719.41    G89.29 338.29    M25.512    2. Fibromyalgia  M79.7 729.1   3. Chronic bilateral low back pain, unspecified whether sciatica present  M54.50 724.2    G89.29 338.29           Subjective Questionnaire: QuickDASH: 43.18  Clinical Progress: improved  Home Program Compliance: No  Treatment has included: therapeutic exercise, neuromuscular re-education, manual therapy and therapeutic activity      Subjective   Eugenie Rapp reports that the shoulders are \"better than they have been\" but she has been having multiple other complaints.  She continues to struggle with exercise compliance, she did try the wall push-up exercise 3-4 x's in the last two weeks but she struggled to remember how often to do them.  She has complained of medial wrist pain and swelling.  She has been trying to hang poppy decorations and she has had some shoulder/neck pain as a result of this.  Pain level is 8/10.  She notes that she has also had some Achilles related pain from climbing on a step-stool.      Objective          Observations     Additional Shoulder Observation Details  Forward shoulder and head    Palpation     Additional Palpation Details  TTP over the lateral shoulder generally bilaterally.    Cervical/Thoracic Screen   Cervical range of motion within normal limits    Active Range of Motion   Left Shoulder   Normal active range of motion  External rotation 90°: with pain  External rotation BTH: with pain    Right Shoulder   Normal active range of motion  Flexion: Right shoulder active forward flexion: end range pain. "   Abduction: Right shoulder active abduction: end range pain.   External rotation 90°: with pain  External rotation BTH: with pain    Passive Range of Motion   Left Shoulder   Normal passive range of motion    Right Shoulder   Normal passive range of motion  External rotation 90°: Right shoulder passive external rotation at 90 degrees: excessive.   Internal rotation 90°: Right shoulder passive internal rotation at 90 degrees: excessive.     Strength/Myotome Testing     Left Shoulder     Planes of Motion   Flexion: 4   Abduction: 4   External rotation at 0°: 4+   External rotation at 45°: 4+   External rotation at 90°: 4   Internal rotation at 0°: 5     Isolated Muscles   Biceps: 5     Right Shoulder     Planes of Motion   Flexion: 4   Abduction: 4   External rotation at 0°: 4+   External rotation at 45°: 4+   External rotation at 90°: 4   Internal rotation at 0°: 5     Isolated Muscles   Biceps: 5     Tests     Left Shoulder   Positive full can, Hawkin's and painful arc.     Right Shoulder   Positive full can.   Negative Hawkin's and painful arc.     Additional Tests Details  Full can weak and painful            Assessment & Plan     Assessment  Impairments: abnormal or restricted ROM, activity intolerance, impaired physical strength, lacks appropriate home exercise program and pain with function  Functional Limitations: carrying objects, lifting, pulling, pushing, uncomfortable because of pain, reaching overhead and unable to perform repetitive tasks  Assessment details: Patient presents with chronic widespread pain and hypermobility with chronic bilateral shoulder pain R>L consistent with supraspinatus impingement in a hypermobile shoulder.  She has had continued poor compliance with her overall exercise program.  She has a complex medical history that factors in to her lack of improvement including fibromyalgia, CVA, chronic pain, and depression.  She has made marginal improvements but likely will not improve  with continued poor compliance.  Will continue to treat her until she follows up with referring provider in early January but will d/c care if her pain and poor compliance is persistent until then.    Goals  Plan Goals: Short Term Goals (2wks) ALL ONGOING  1. Patient to report bilateral shoulder pain less than or equal to 3/10.  2. Patient will be independent and compliant with initial home exercise program.       Long Term Goals (6wks)  ALL ONGOING    1. Patient to abduct to at least 120 with 1-2# in her hand.  2. Patient to report improved daily pain.  3. Pt. will be independent and compliant with advanced home exercise program to facilitate self-management of symptoms.  4. Patient will improve Quick Dash score to at least 22%.        Plan  Therapy options: will be seen for skilled therapy services  Planned modality interventions: TENS, thermotherapy (hydrocollator packs), cryotherapy, ultrasound and dry needling  Planned therapy interventions: manual therapy, strengthening, stretching, postural training, joint mobilization and neuromuscular re-education  Frequency: 1x week  Duration in visits: 4  Treatment plan discussed with: patient           Recommendations: Continue as planned  Timeframe: 1 month  Prognosis to achieve goals: fair      Timed:         Manual Therapy:    15     mins  80933;     Therapeutic Exercise:    15     mins  62945;     Neuromuscular Allison:    0    mins  95527;    Therapeutic Activity:     14     mins  59226;     Gait Trainin     mins  91937;     Ultrasound:     0     mins  83455;    Ionto                               0    mins   43788  Self Care                       0     mins   42637  Canalith Repos    0     mins 90688      Un-Timed:  Electrical Stimulation:    0     mins  53475 (MC );  Dry Needling     0     mins self-pay  Traction     0     mins 65646  Re-Eval                           0    mins  80820      Timed Treatment:   44   mins   Total Treatment:     44    mins          PT: Yo Trejo, PT     [place] License:  [id number]    Electronically signed by Yo Trejo, PT, 11/29/21, 2:20 PM EST    Certification Period: 11/29/2021 thru 2/26/2022  I certify that the therapy services are furnished while this patient is under my care.  The services outlined above are required by this patient, and will be reviewed every 90 days.         Physician Signature:__________________________________________________    PHYSICIAN: Gonsalo Weaver PA      DATE:     Please sign and return via fax to [email or web address] . Thank you, [facility]

## 2021-12-07 RX ORDER — MELOXICAM 15 MG/1
TABLET ORAL
Qty: 90 TABLET | Refills: 0 | Status: SHIPPED | OUTPATIENT
Start: 2021-12-07 | End: 2022-03-08

## 2021-12-07 NOTE — TELEPHONE ENCOUNTER
Rx Refill Note  Requested Prescriptions     Pending Prescriptions Disp Refills   • meloxicam (MOBIC) 15 MG tablet [Pharmacy Med Name: MELOXICAM 15 MG TABLET] 90 tablet 0     Sig: TAKE ONE TABLET BY MOUTH DAILY AS NEEDED FOR MODERATE PAIN      Last office visit with prescribing clinician: 3/12/2021      Next office visit with prescribing clinician: Visit date not found            Dread Hernadez MA  12/07/21, 15:20 EST

## 2021-12-08 ENCOUNTER — TREATMENT (OUTPATIENT)
Dept: PHYSICAL THERAPY | Facility: CLINIC | Age: 51
End: 2021-12-08

## 2021-12-08 DIAGNOSIS — G89.29 CHRONIC PAIN OF BOTH SHOULDERS: Primary | ICD-10-CM

## 2021-12-08 DIAGNOSIS — M25.511 CHRONIC PAIN OF BOTH SHOULDERS: Primary | ICD-10-CM

## 2021-12-08 DIAGNOSIS — M25.512 CHRONIC PAIN OF BOTH SHOULDERS: Primary | ICD-10-CM

## 2021-12-08 DIAGNOSIS — M79.7 FIBROMYALGIA: ICD-10-CM

## 2021-12-08 PROCEDURE — 97140 MANUAL THERAPY 1/> REGIONS: CPT | Performed by: PHYSICAL THERAPIST

## 2021-12-08 PROCEDURE — 97110 THERAPEUTIC EXERCISES: CPT | Performed by: PHYSICAL THERAPIST

## 2021-12-08 NOTE — PROGRESS NOTES
Physical Therapy Daily Treatment Note      Patient: Eugenie Rapp   : 1970  Referring practitioner: PAULA Lyles  Date of Initial Visit: Type: THERAPY  Noted: 2021  Today's Date: 2021  Patient seen for 9 sessions       Visit Diagnoses:    ICD-10-CM ICD-9-CM   1. Chronic pain of both shoulders  M25.511 719.41    G89.29 338.29    M25.512    2. Fibromyalgia  M79.7 729.1       Subjective   Patient reports that she is frustrated because she is not compliant.  She notes that she doesn't feel like her right shoulder is doing as well because she doesn't exercise.  She will be travelling this weekend.  her pain level is 4-5/10 upon arrival.      Objective     See Exercise, Manual, and Modality Logs for complete treatment.       Assessment & Plan     Assessment    Assessment details: Patient continues with poor overall compliance.  She struggles LUE cocontraction of serratus/low trap.    Plan  Plan details: Progress SA/LT activities.          Timed:         Manual Therapy:    25     mins  69272;     Therapeutic Exercise:    10     mins  91669;     Neuromuscular Allison:    0    mins  48561;    Therapeutic Activity:     0     mins  45951;     Gait Trainin     mins  77992;     Ultrasound:     0     mins  26332;    Ionto                               0    mins   24834  Self Care                       0     mins   26814  Canalith Repos    0     mins 47920      Un-Timed:  Electrical Stimulation:    0     mins  36496 ( );  Dry Needling     0     mins self-pay  Traction     0     mins 92499      Timed Treatment:   35   mins   Total Treatment:     35   mins    Yo Trejo, PT  [place] License: [id number]

## 2021-12-17 DIAGNOSIS — M79.7 FIBROMYALGIA: ICD-10-CM

## 2021-12-17 NOTE — TELEPHONE ENCOUNTER
Rx Refill Note  Requested Prescriptions     Pending Prescriptions Disp Refills   • gabapentin (NEURONTIN) 600 MG tablet [Pharmacy Med Name: GABAPENTIN 600 MG TABLET] 270 tablet      Sig: TAKE ONE TABLET BY MOUTH THREE TIMES A DAY      Last office visit with prescribing clinician: 3/12/2021      Next office visit with prescribing clinician: Visit date not found            Dread Hernadez MA  12/17/21, 15:39 EST

## 2021-12-20 ENCOUNTER — TREATMENT (OUTPATIENT)
Dept: PHYSICAL THERAPY | Facility: CLINIC | Age: 51
End: 2021-12-20

## 2021-12-20 DIAGNOSIS — M25.511 CHRONIC PAIN OF BOTH SHOULDERS: Primary | ICD-10-CM

## 2021-12-20 DIAGNOSIS — G89.29 CHRONIC PAIN OF BOTH SHOULDERS: Primary | ICD-10-CM

## 2021-12-20 DIAGNOSIS — M25.512 CHRONIC PAIN OF BOTH SHOULDERS: Primary | ICD-10-CM

## 2021-12-20 DIAGNOSIS — M79.7 FIBROMYALGIA: ICD-10-CM

## 2021-12-20 PROCEDURE — 97110 THERAPEUTIC EXERCISES: CPT | Performed by: PHYSICAL THERAPIST

## 2021-12-20 PROCEDURE — 97140 MANUAL THERAPY 1/> REGIONS: CPT | Performed by: PHYSICAL THERAPIST

## 2021-12-20 NOTE — PROGRESS NOTES
Physical Therapy Daily Treatment Note      Patient: Eugenie Rapp   : 1970  Referring practitioner: PAULA Lyles  Date of Initial Visit: Type: THERAPY  Noted: 2021  Today's Date: 2021  Patient seen for 10 sessions       Visit Diagnoses:    ICD-10-CM ICD-9-CM   1. Chronic pain of both shoulders  M25.511 719.41    G89.29 338.29    M25.512    2. Fibromyalgia  M79.7 729.1       Subjective   Patient reports that she is doing well overall but she did extra walking today and her pain increased.  SHe notes her shoulders have been better, she has found it easier to work on exercises but still not really often.  her pain level is 4/10 upon arrival.      Objective   See Exercise, Manual, and Modality Logs for complete treatment.       Assessment & Plan     Assessment    Assessment details: Patient is extremely challenged in sidelying positions to stabilize the shoulder.  She demonstrates reduced overall pain after treatment and improved stability.    Plan  Plan details: Reassessment, d/c considerations.           Timed:         Manual Therapy:    25     mins  04847;     Therapeutic Exercise:    17     mins  86735;     Neuromuscular Allison:    0    mins  92427;    Therapeutic Activity:     0     mins  09697;     Gait Trainin     mins  02332;     Ultrasound:     0     mins  01759;    Ionto                               0    mins   64601  Self Care                       0     mins   47155  Canalith Repos    0     mins 92235      Un-Timed:  Electrical Stimulation:    0     mins  42165 ( );  Dry Needling     0     mins self-pay  Traction     0     mins 23998      Timed Treatment:   42   mins   Total Treatment:     42   mins    Yo Trejo, PT  [place] License: [id number]

## 2021-12-21 RX ORDER — GABAPENTIN 600 MG/1
TABLET ORAL
Qty: 270 TABLET | Refills: 0 | Status: SHIPPED | OUTPATIENT
Start: 2021-12-21 | End: 2022-03-28

## 2021-12-28 ENCOUNTER — TREATMENT (OUTPATIENT)
Dept: PHYSICAL THERAPY | Facility: CLINIC | Age: 51
End: 2021-12-28

## 2021-12-28 DIAGNOSIS — G89.29 CHRONIC PAIN OF BOTH SHOULDERS: Primary | ICD-10-CM

## 2021-12-28 DIAGNOSIS — M25.512 CHRONIC PAIN OF BOTH SHOULDERS: Primary | ICD-10-CM

## 2021-12-28 DIAGNOSIS — M79.7 FIBROMYALGIA: ICD-10-CM

## 2021-12-28 DIAGNOSIS — M25.511 CHRONIC PAIN OF BOTH SHOULDERS: Primary | ICD-10-CM

## 2021-12-28 PROCEDURE — 97140 MANUAL THERAPY 1/> REGIONS: CPT | Performed by: PHYSICAL THERAPIST

## 2021-12-28 PROCEDURE — 97110 THERAPEUTIC EXERCISES: CPT | Performed by: PHYSICAL THERAPIST

## 2021-12-28 PROCEDURE — 97530 THERAPEUTIC ACTIVITIES: CPT | Performed by: PHYSICAL THERAPIST

## 2021-12-28 NOTE — PROGRESS NOTES
Physical Therapy Re Certification Of Plan of Care  Patient: Eugenie Rapp   : 1970  Diagnosis/ICD-10 Code:  Chronic pain of both shoulders [M25.511, G89.29, M25.512]  Referring practitioner: PAULA Lyles  Date of Initial Visit: 2021  Today's Date: 2021  Patient seen for 11 sessions         Visit Diagnoses:    ICD-10-CM ICD-9-CM   1. Chronic pain of both shoulders  M25.511 719.41    G89.29 338.29    M25.512    2. Fibromyalgia  M79.7 729.1           Subjective Questionnaire: QuickDASH: 40.91  Clinical Progress: improved  Home Program Compliance: Yes, but poor.  Treatment has included: therapeutic exercise, neuromuscular re-education, manual therapy and therapeutic activity      Subjective   Eugenie Rapp reports she went and bought a kettlebell following her last session and has performed the exercises around 3-4 times.  She notes rapid fatigue but no pain with the exercise.  She also notes that she notes around 50% improvement in functional tasks involving reaching.  Her pain on arrival is 3/10.    Objective          Observations     Additional Shoulder Observation Details  Forward shoulder and head    Palpation     Additional Palpation Details  TTP over the lateral shoulder generally bilaterally.    Cervical/Thoracic Screen   Cervical range of motion within normal limits    Active Range of Motion   Left Shoulder   Normal active range of motion  External rotation 90°: with pain  External rotation BTH: with pain    Right Shoulder   Normal active range of motion  Flexion: Right shoulder active forward flexion: end range pain.   Abduction: Right shoulder active abduction: end range pain.   External rotation 90°: with pain  External rotation BTH: with pain    Passive Range of Motion   Left Shoulder   Normal passive range of motion    Right Shoulder   Normal passive range of motion  External rotation 90°: Right shoulder passive external rotation at 90 degrees: excessive.   Internal rotation  90°: Right shoulder passive internal rotation at 90 degrees: excessive.     Strength/Myotome Testing     Left Shoulder     Planes of Motion   Flexion: 4+   Abduction: 4+   External rotation at 0°: 5   External rotation at 45°: 5   External rotation at 90°: 4+   Internal rotation at 0°: 5     Isolated Muscles   Biceps: 5     Right Shoulder     Planes of Motion   Flexion: 4+   Abduction: 4   External rotation at 0°: 5   External rotation at 45°: 5   External rotation at 90°: 4   Internal rotation at 0°: 5     Isolated Muscles   Biceps: 5     Tests     Left Shoulder   Positive full can (strong and painful), Hawkin's and painful arc.     Right Shoulder   Positive full can (weak and painful) and painful arc.   Negative Hawkin's.     Additional Tests Details              Assessment & Plan     Assessment  Impairments: abnormal or restricted ROM, activity intolerance, impaired physical strength, lacks appropriate home exercise program and pain with function  Functional Limitations: carrying objects, lifting, pulling, pushing, uncomfortable because of pain, reaching overhead and unable to perform repetitive tasks  Assessment details: Patient presents with chronic widespread pain and hypermobility with chronic bilateral shoulder pain R>L consistent with supraspinatus impingement in a hypermobile shoulder.  Her HEP compliance has improved somewhat since her previous session. She has a complex medical history that factors in to her lack of improvement including fibromyalgia, CVA, chronic pain, and depression.  With improvements in her compliance, will plan to continue skilled PT services for around one additional month to work on an appropriate HEP before transition to I symptom management.    Goals  Plan Goals: Short Term Goals (2wks) ALL MET  1. Patient to report bilateral shoulder pain less than or equal to 3/10.  2. Patient will be independent and compliant with initial home exercise program.       Long Term Goals (6wks)   ALL ONGOING    1. Patient to abduct to at least 120 with 1-2# in her hand.  2. Patient to report improved daily pain.  3. Pt. will be independent and compliant with advanced home exercise program to facilitate self-management of symptoms.  4. Patient will improve Quick Dash score to at least 22%.        Plan  Therapy options: will be seen for skilled therapy services  Planned modality interventions: TENS, thermotherapy (hydrocollator packs), cryotherapy, ultrasound and dry needling  Planned therapy interventions: manual therapy, strengthening, stretching, postural training, joint mobilization and neuromuscular re-education  Frequency: 1x week  Duration in visits: 4  Treatment plan discussed with: patient           Recommendations: Continue as planned  Timeframe: 1 month  Prognosis to achieve goals: fair      Timed:         Manual Therapy:    24     mins  95684;     Therapeutic Exercise:    12     mins  92436;     Neuromuscular Allison:    0    mins  12157;    Therapeutic Activity:     10     mins  94770;     Gait Trainin     mins  15707;     Ultrasound:     0     mins  35881;    Ionto                               0    mins   82497  Self Care                       0     mins   45763  Canalith Repos    0     mins 31648      Un-Timed:  Electrical Stimulation:    0     mins  39152 ( );  Dry Needling     0     mins self-pay  Traction     0     mins 23630  Re-Eval                           0    mins  79078      Timed Treatment:   46   mins   Total Treatment:     46   mins          PT: Yo Trejo, PT     [place] License:  [id number]    Electronically signed by Yo Trejo, PT, 21, 2:29 PM EST    Certification Period: 2021 thru 3/27/2022  I certify that the therapy services are furnished while this patient is under my care.  The services outlined above are required by this patient, and will be reviewed every 90 days.         Physician  Signature:__________________________________________________    PHYSICIAN: Gonsalo Weaver PA      DATE:     Please sign and return via fax to [email or web address] . Thank you, [facility]

## 2022-02-17 RX ORDER — BUSPIRONE HYDROCHLORIDE 15 MG/1
TABLET ORAL
Qty: 180 TABLET | Refills: 0 | Status: SHIPPED | OUTPATIENT
Start: 2022-02-17 | End: 2022-05-19

## 2022-02-17 NOTE — TELEPHONE ENCOUNTER
Rx Refill Note  Requested Prescriptions     Pending Prescriptions Disp Refills   • busPIRone (BUSPAR) 15 MG tablet [Pharmacy Med Name: BUSPIRONE HCL 15 MG TABLET] 180 tablet 4     Sig: TAKE ONE TABLET BY MOUTH TWICE A DAY      Last office visit with prescribing clinician: 3/12/2021      Next office visit with prescribing clinician: Visit date not found            Dread Hernadez MA  02/17/22, 12:48 EST

## 2022-02-22 ENCOUNTER — TELEPHONE (OUTPATIENT)
Dept: FAMILY MEDICINE CLINIC | Facility: CLINIC | Age: 52
End: 2022-02-22

## 2022-02-22 DIAGNOSIS — Z00.00 WELL ADULT EXAM: Primary | ICD-10-CM

## 2022-02-22 NOTE — TELEPHONE ENCOUNTER
Caller: Eugenie Rapp    Relationship: Self    Best call back number: [phone]     What orders are you requesting (i.e. lab or imaging): LABS     In what timeframe would the patient need to come in: ASAP     Where will you receive your lab/imaging services: [facility]     Additional notes: PATIENT HAS PHYSICAL APPOINTMENT SCHEDULED 3/14/2022 WITH APRN

## 2022-02-22 NOTE — TELEPHONE ENCOUNTER
Caller: Eugenie Rapp    Relationship to patient: Self    Best call back number: [phone]   Patient is needing: PATIENT CALLED TO SCHEDULE PHYSICAL . PATIENT PCP NOT AVAILABLE. HUB SCHEDULED WITH APRN ON 3/14/2022           overnight  Now switched to Lasix drip    ROS:     Seen with- RN, ICU team and also CT attending    Unable to obtain ROS due to BiPAP      PMH/PSH/SH/Family History:     Past Medical History:   Diagnosis Date    CAD (coronary artery disease)     [facility]     Diabetes mellitus [facility]     Gout     Hyperlipidemia     Hypertension     Thyroid disease        Past Surgical History:   Procedure Laterality Date    BRONCHOSCOPY N/A 1/9/2020    BRONCHOSCOPY ALVEOLAR LAVAGE performed by Isadora Warren MD at [facility] CABG WITH AORTIC VALVE REPLACEMENT      CORONARY ANGIOPLASTY WITH STENT PLACEMENT  12/07/2010    NURIS- 4.0 x 28 to Cx    NC ASCEND AORTA GRAFT W ROOT REPLACMENT. VALVE CONDUIT/CORON RECONSTR N/A 1/21/2020    RE-DO STERNOTOMY X3, REPLACEMENT OF ASCENDING AORTA, RE-DO AORTIC VALVE REPLACEMENT WITH 19MM BLOCK INTUITY VALVE, CLOSURE OF OUTFLOW TRACK ATRIAL FISTULA, HYPOTHERMIC ARREST,  WITH TRANSESOPHAGEAL ECHOCARDIOGRAM, CIRCULATORY ARREST, 5 LEVEL BILATERAL INTERCOSTAL NERVE BLOCK, performed by Petr Foss MD at [place] [id number] Left 1/2/2020    EMERGENT THORACOTOMY, EVACUATION OF HEMOTOMA CHEST WALL HEMOSTASIS, PLEUREAL BIOPSY, LEFT UPPER LOBE RESECTION performed by Farzana Wilde MD at [address]        reports that he has been smoking cigarettes. He has been smoking about 2.00 packs per day. He uses smokeless tobacco. He reports current alcohol use of about 6.0 standard drinks of alcohol per week. He reports previous drug use.    family history is not on file.          Medication:     Current Facility-Administered Medications: meropenem (MERREM) 1 g in sodium chloride 0.9 % 100 mL IVPB (mini-bag), 1 g, Intravenous, Q12H  furosemide (LASIX) 100 mg in dextrose 5 % 100 mL infusion, 10 mg/hr, Intravenous, Continuous  norepinephrine (LEVOPHED) 16 mg in dextrose 5 % 250 mL infusion, 2 mcg/min, Intravenous, Titrated  DOBUTamine (DOBUTREX) 500 mg insight- normal           AAO X 3  Additional finding:      LABS:     Recent Labs     01/21/20  1845  01/22/20  0430  01/22/20  0849 01/23/20  0345 01/23/20  0635   WBC 14.9*  --  9.6  --   --  18.6*  --    HGB 9.7*   < > 8.6*   < > 7.4* 8.3* 7.8*   HCT 28.9*  --  25.1*  --   --  25.6*  --    *  --  66*  --   --  47*  --     < > = values in this interval not displayed.      Recent Labs     01/21/20  1845 01/22/20  0430 01/23/20  0345    141 134*   K 4.0 4.1 5.9*   CL 99 103 97*   CO2 24 26 18*   BUN 11 16 29*   CREATININE 0.8 1.1 1.6*   GLUCOSE 186* 199* 192*   MG 2.80* 2.50* 2.50*

## 2022-03-08 RX ORDER — TRAZODONE HYDROCHLORIDE 50 MG/1
TABLET ORAL
Qty: 90 TABLET | Refills: 4 | Status: SHIPPED | OUTPATIENT
Start: 2022-03-08 | End: 2022-12-19

## 2022-03-08 RX ORDER — MELOXICAM 15 MG/1
TABLET ORAL
Qty: 90 TABLET | Refills: 0 | Status: SHIPPED | OUTPATIENT
Start: 2022-03-08 | End: 2022-06-04

## 2022-03-08 RX ORDER — BUPROPION HYDROCHLORIDE 300 MG/1
TABLET ORAL
Qty: 90 TABLET | Refills: 4 | Status: SHIPPED | OUTPATIENT
Start: 2022-03-08

## 2022-03-08 RX ORDER — FAMOTIDINE 40 MG/1
TABLET, FILM COATED ORAL
Qty: 90 TABLET | Refills: 4 | Status: SHIPPED | OUTPATIENT
Start: 2022-03-08

## 2022-03-08 RX ORDER — PANTOPRAZOLE SODIUM 40 MG/1
TABLET, DELAYED RELEASE ORAL
Qty: 90 TABLET | Refills: 4 | Status: SHIPPED | OUTPATIENT
Start: 2022-03-08

## 2022-03-08 NOTE — TELEPHONE ENCOUNTER
Rx Refill Note  Requested Prescriptions     Pending Prescriptions Disp Refills   • famotidine (PEPCID) 40 MG tablet [Pharmacy Med Name: FAMOTIDINE 40 MG TABLET] 90 tablet 4     Sig: TAKE ONE TABLET BY MOUTH EVERY NIGHT AT BEDTIME   • pantoprazole (PROTONIX) 40 MG EC tablet [Pharmacy Med Name: PANTOPRAZOLE SOD DR 40 MG TAB] 90 tablet 4     Sig: TAKE ONE TABLET BY MOUTH DAILY   • traZODone (DESYREL) 50 MG tablet [Pharmacy Med Name: traZODone 50 MG TABLET] 90 tablet 4     Sig: TAKE ONE TABLET BY MOUTH ONCE NIGHTLY   • buPROPion XL (WELLBUTRIN XL) 300 MG 24 hr tablet [Pharmacy Med Name: buPROPion HCL  MG TABLET] 90 tablet 4     Sig: TAKE ONE TABLET BY MOUTH EVERY MORNING   • meloxicam (MOBIC) 15 MG tablet [Pharmacy Med Name: MELOXICAM 15 MG TABLET] 90 tablet 0     Sig: TAKE ONE TABLET BY MOUTH DAILY AS NEEDED FOR MODERATE PAIN      Last office visit with prescribing clinician: 3/12/2021      Next office visit with prescribing clinician: 3/30/2022            Dread Hernadez MA  03/08/22, 13:46 EST

## 2022-03-28 DIAGNOSIS — M79.7 FIBROMYALGIA: ICD-10-CM

## 2022-03-28 RX ORDER — GABAPENTIN 600 MG/1
TABLET ORAL
Qty: 270 TABLET | Refills: 0 | Status: SHIPPED | OUTPATIENT
Start: 2022-03-28 | End: 2022-06-26

## 2022-03-28 NOTE — TELEPHONE ENCOUNTER
Rx Refill Note  Requested Prescriptions     Pending Prescriptions Disp Refills   • gabapentin (NEURONTIN) 600 MG tablet [Pharmacy Med Name: GABAPENTIN 600 MG TABLET] 270 tablet      Sig: TAKE ONE TABLET BY MOUTH THREE TIMES A DAY      Last office visit with prescribing clinician: 3/12/2021      Next office visit with prescribing clinician: 3/30/2022            Dread Hernadez MA  03/28/22, 12:38 EDT     Needs up to date contract

## 2022-03-30 ENCOUNTER — OFFICE VISIT (OUTPATIENT)
Dept: FAMILY MEDICINE CLINIC | Facility: CLINIC | Age: 52
End: 2022-03-30

## 2022-03-30 VITALS
DIASTOLIC BLOOD PRESSURE: 76 MMHG | RESPIRATION RATE: 16 BRPM | BODY MASS INDEX: 31.49 KG/M2 | HEART RATE: 68 BPM | TEMPERATURE: 97.1 F | WEIGHT: 212.6 LBS | HEIGHT: 69 IN | SYSTOLIC BLOOD PRESSURE: 116 MMHG | OXYGEN SATURATION: 98 %

## 2022-03-30 DIAGNOSIS — G43.809 OTHER MIGRAINE WITHOUT STATUS MIGRAINOSUS, NOT INTRACTABLE: ICD-10-CM

## 2022-03-30 DIAGNOSIS — K21.9 GASTROESOPHAGEAL REFLUX DISEASE, UNSPECIFIED WHETHER ESOPHAGITIS PRESENT: Primary | ICD-10-CM

## 2022-03-30 DIAGNOSIS — G47.00 INSOMNIA, UNSPECIFIED TYPE: ICD-10-CM

## 2022-03-30 DIAGNOSIS — E78.5 HYPERLIPIDEMIA, UNSPECIFIED HYPERLIPIDEMIA TYPE: ICD-10-CM

## 2022-03-30 DIAGNOSIS — Z00.00 WELL ADULT EXAM: ICD-10-CM

## 2022-03-30 DIAGNOSIS — J98.01 COUGH DUE TO BRONCHOSPASM: ICD-10-CM

## 2022-03-30 DIAGNOSIS — M79.7 FIBROMYALGIA: ICD-10-CM

## 2022-03-30 PROCEDURE — 99396 PREV VISIT EST AGE 40-64: CPT | Performed by: INTERNAL MEDICINE

## 2022-03-30 RX ORDER — ALBUTEROL SULFATE 90 UG/1
2 AEROSOL, METERED RESPIRATORY (INHALATION) EVERY 6 HOURS PRN
Qty: 18 G | Refills: 1 | Status: SHIPPED | OUTPATIENT
Start: 2022-03-30

## 2022-03-30 NOTE — PROGRESS NOTES
"Chief Complaint  Annual Exam    Subjective          Eugenie Rapp presents to [facility]  History of Present Illness  Here for CPE.  She is living in [place].  She has lost 16 pounds with intermittent fasting --14 hours of fasting and eats between 9 am -7 pm and also eating less overall.  No sodas since July.  She is on 50 mg trazodone and 1200 mg gabapentin qhs and 600 mg in am and also on 5 mg melatonin qhs.  She is seeing pain clinic in [place] and is getting tramadol prn.  Taking it about once a day and also on CBD powder. Taking wellbutrin  mg qd and buspar 15 mg bid for depression.  She takes caffeine in the morning b/c she is tired from meds qhs.  She doesn't want to decrease any of her dosages at this time.  She does walk about 15 minutes /day with the dog o/w  Sedentary as she spends a lot of time with her mom that lives with her.   MMG and pap are not up to date   9/2020 EGD and CN and repeat CN in 10 years    Objective   Vital Signs:   /76   Pulse 68   Temp 97.1 °F (36.2 °C) (Temporal)   Resp 16   Ht 175.3 cm (69\")   Wt 96.4 kg (212 lb 9.6 oz)   SpO2 98%   BMI 31.40 kg/m²     BMI is above normal parameters. Recommendations: exercise counseling/recommendations and nutrition counseling/recommendations     Microscopic Examination - (03/25/2022 09:26)  Urinalysis With Culture If Indicated - Urine, Clean Catch (03/25/2022 09:26)  T4, Free (03/25/2022 09:26)  TSH (03/25/2022 09:26)  Lipid Panel With LDL / HDL Ratio (03/25/2022 09:26)  Comprehensive Metabolic Panel (03/25/2022 09:26)  CBC & Differential (03/25/2022 09:26)    Physical Exam  Vitals and nursing note reviewed.   Constitutional:       Appearance: Normal appearance. She is well-developed.   HENT:      Head: Normocephalic and atraumatic.      Right Ear: External ear normal.      Left Ear: External ear normal.   Eyes:      Extraocular Movements: Extraocular movements intact.      Conjunctiva/sclera: " Conjunctivae normal.   Neck:      Vascular: No carotid bruit.   Cardiovascular:      Rate and Rhythm: Normal rate and regular rhythm.      Heart sounds: Normal heart sounds.      Comments: No bruits  Pulmonary:      Effort: Pulmonary effort is normal. No respiratory distress.      Breath sounds: Normal breath sounds. No wheezing or rales.   Abdominal:      General: Bowel sounds are normal. There is no distension.      Palpations: Abdomen is soft. There is no mass.      Tenderness: There is no abdominal tenderness.   Musculoskeletal:      Cervical back: Neck supple.   Lymphadenopathy:      Cervical: No cervical adenopathy.   Skin:     General: Skin is warm.   Neurological:      General: No focal deficit present.      Mental Status: She is alert and oriented to person, place, and time.   Psychiatric:         Mood and Affect: Mood normal.         Behavior: Behavior normal.         Thought Content: Thought content normal.         Judgment: Judgment normal.        Result Review :                The 10-year ASCVD risk score (Whit NERI Jr., et al., 2013) is: 1.2%    Values used to calculate the score:      Age: 51 years      Sex: Female      Is Non- : No      Diabetic: No      Tobacco smoker: No      Systolic Blood Pressure: 116 mmHg      Is BP treated: No      HDL Cholesterol: 61 mg/dL      Total Cholesterol: 220 mg/dL    Assessment and Plan    Diagnoses and all orders for this visit:    1. Gastroesophageal reflux disease, unspecified whether esophagitis present (Primary)    2. Fibromyalgia    3. Other migraine without status migrainosus, not intractable    4. Insomnia, unspecified type    5. Well adult exam    6. Cough due to bronchospasm    7. Hyperlipidemia, unspecified hyperlipidemia type    Other orders  -     albuterol sulfate  (90 Base) MCG/ACT inhaler; Inhale 2 puffs Every 6 (Six) Hours As Needed for Wheezing.  Dispense: 18 g; Refill: 1        Follow Up   Return in about 1 year (around  3/30/2023).  Patient was given instructions and counseling regarding her condition or for health maintenance advice. Please see specific information pulled into the AVS if appropriate.     I have encouraged her to get in with her gynecologist, Dr. Barfield, for updated Pap smear and mammogram.  Refill albuterol that she uses on an as-needed basis when she gets an upper respiratory infection and has a bronchospastic cough.  Contract updated and signed due to gabapentin which she takes for chronic pain related to fibromyalgia and arthritis.  She is under the care of pain management in [place].  They do give her tramadol as needed.  Fasting labs been reviewed with patient.  She does have mild hyperlipidemia.  Her cardiac risk score is very low.  She prefers to continue lowering fatty foods and working on diet  I also suggested increase in activity with water aerobics or stationary bike.  Very encouraged with her weight loss!    Vaccinations are up to date.

## 2022-05-19 RX ORDER — BUSPIRONE HYDROCHLORIDE 15 MG/1
TABLET ORAL
Qty: 180 TABLET | Refills: 0 | Status: SHIPPED | OUTPATIENT
Start: 2022-05-19 | End: 2022-08-12

## 2022-05-19 NOTE — TELEPHONE ENCOUNTER
Rx Refill Note  Requested Prescriptions     Pending Prescriptions Disp Refills   • busPIRone (BUSPAR) 15 MG tablet [Pharmacy Med Name: BUSPIRONE HCL 15 MG TABLET] 180 tablet 0     Sig: TAKE ONE TABLET BY MOUTH TWICE A DAY      Last office visit with prescribing clinician: 3/30/2022      Next office visit with prescribing clinician: 4/25/2023            Dread Hernadez MA  05/19/22, 14:20 EDT

## 2022-06-04 RX ORDER — MELOXICAM 15 MG/1
TABLET ORAL
Qty: 90 TABLET | Refills: 1 | Status: SHIPPED | OUTPATIENT
Start: 2022-06-04 | End: 2022-11-22

## 2022-06-10 DIAGNOSIS — M79.7 FIBROMYALGIA: ICD-10-CM

## 2022-06-26 RX ORDER — GABAPENTIN 600 MG/1
TABLET ORAL
Qty: 270 TABLET | Refills: 0 | Status: SHIPPED | OUTPATIENT
Start: 2022-06-26 | End: 2022-09-19

## 2022-08-11 NOTE — TELEPHONE ENCOUNTER
Rx Refill Note  Requested Prescriptions     Pending Prescriptions Disp Refills   • busPIRone (BUSPAR) 15 MG tablet [Pharmacy Med Name: BUSPIRONE HCL 15 MG TABLET] 180 tablet 0     Sig: TAKE ONE TABLET BY MOUTH TWICE A DAY      Last office visit with prescribing clinician: 3/30/2022      Next office visit with prescribing clinician: 4/25/2023            Dread Hernadez MA  08/11/22, 11:06 EDT

## 2022-08-12 RX ORDER — BUSPIRONE HYDROCHLORIDE 15 MG/1
TABLET ORAL
Qty: 180 TABLET | Refills: 1 | Status: SHIPPED | OUTPATIENT
Start: 2022-08-12 | End: 2023-01-31

## 2022-09-19 DIAGNOSIS — M79.7 FIBROMYALGIA: ICD-10-CM

## 2022-09-19 RX ORDER — GABAPENTIN 600 MG/1
TABLET ORAL
Qty: 270 TABLET | Refills: 0 | Status: SHIPPED | OUTPATIENT
Start: 2022-09-19 | End: 2022-12-21

## 2022-09-19 NOTE — TELEPHONE ENCOUNTER
Rx Refill Note  Requested Prescriptions     Pending Prescriptions Disp Refills   • gabapentin (NEURONTIN) 600 MG tablet [Pharmacy Med Name: GABAPENTIN 600 MG TABLET] 270 tablet 0     Sig: TAKE ONE TABLET BY MOUTH THREE TIMES A DAY      Last office visit with prescribing clinician: 3/30/2022      Next office visit with prescribing clinician: 4/25/2023            Dread Hernadez MA  09/19/22, 16:22 EDT     Updated contract

## 2022-10-19 ENCOUNTER — OFFICE VISIT (OUTPATIENT)
Dept: OBSTETRICS AND GYNECOLOGY | Facility: CLINIC | Age: 52
End: 2022-10-19

## 2022-10-19 VITALS
DIASTOLIC BLOOD PRESSURE: 78 MMHG | BODY MASS INDEX: 31.07 KG/M2 | SYSTOLIC BLOOD PRESSURE: 118 MMHG | WEIGHT: 209.8 LBS | HEIGHT: 69 IN

## 2022-10-19 DIAGNOSIS — N95.0 PMB (POSTMENOPAUSAL BLEEDING): Primary | ICD-10-CM

## 2022-10-19 DIAGNOSIS — Z12.31 ENCOUNTER FOR SCREENING MAMMOGRAM FOR MALIGNANT NEOPLASM OF BREAST: ICD-10-CM

## 2022-10-19 LAB
BILIRUB BLD-MCNC: NEGATIVE MG/DL
CLARITY, POC: CLEAR
COLOR UR: YELLOW
GLUCOSE UR STRIP-MCNC: NEGATIVE MG/DL
KETONES UR QL: NEGATIVE
LEUKOCYTE EST, POC: NEGATIVE
NITRITE UR-MCNC: NEGATIVE MG/ML
PH UR: 5 [PH] (ref 5–8)
PROT UR STRIP-MCNC: NEGATIVE MG/DL
RBC # UR STRIP: NEGATIVE /UL
SP GR UR: 1 (ref 1–1.03)
UROBILINOGEN UR QL: NORMAL

## 2022-10-19 PROCEDURE — 58100 BIOPSY OF UTERUS LINING: CPT | Performed by: OBSTETRICS & GYNECOLOGY

## 2022-10-19 PROCEDURE — 81002 URINALYSIS NONAUTO W/O SCOPE: CPT | Performed by: OBSTETRICS & GYNECOLOGY

## 2022-10-19 RX ORDER — CHOLECALCIFEROL (VITAMIN D3) 125 MCG
CAPSULE ORAL
COMMUNITY

## 2022-10-19 NOTE — PROGRESS NOTES
"      Eugenie Rapp is a 52 y.o. patient who presents for follow up of   Chief Complaint   Patient presents with   • PMB       53 yo est pt here for  VB. She was last seen in 7/2020. She underwent menopause in 2018 and just had an episode of bright red vaginal bleeding after sex. She attributes this to vaginal dryness but we discussed the importance of endometrial sampling and she agrees to an endo bx in the office. I offered to do her annual today but she declines. Her US today shows a 6 cm AV uterus with an EL 0.97 cm and normal ovaries. There is no comparable imaging data.      The following portions of the patient's history were reviewed and updated as appropriate: allergies, current medications and problem list.    Review of Systems   Constitutional: Positive for activity change.   Genitourinary: Positive for vaginal bleeding and vaginal pain (dryness).   All other systems reviewed and are negative.      /78   Ht 175.3 cm (69\")   Wt 95.2 kg (209 lb 12.8 oz)   LMP  (LMP Unknown)   BMI 30.98 kg/m²     Physical Exam  Vitals and nursing note reviewed. Exam conducted with a chaperone present.   Constitutional:       Appearance: Normal appearance. She is well-developed. She is obese.   HENT:      Head: Normocephalic and atraumatic.   Eyes:      General: No scleral icterus.     Conjunctiva/sclera: Conjunctivae normal.   Neck:      Thyroid: No thyromegaly.   Abdominal:      General: There is no distension.      Palpations: Abdomen is soft. There is no mass.      Tenderness: There is no abdominal tenderness. There is no guarding or rebound.      Hernia: No hernia is present.   Genitourinary:     General: Normal vulva.      Vagina: Normal.      Cervix: Normal.      Uterus: Normal.       Adnexa: Right adnexa normal and left adnexa normal.      Comments: Moderate atrophy  Skin:     General: Skin is warm and dry.   Neurological:      Mental Status: She is alert and oriented to person, place, and time. "   Psychiatric:         Mood and Affect: Mood normal.         Behavior: Behavior normal.         Thought Content: Thought content normal.         Judgment: Judgment normal.       PROCEDURE NOTE    Procedures      Diagnosis  Post menopausal bleeding       No LMP recorded (lmp unknown). Patient is postmenopausal.         UCG: negative  Menopausal No   HRT  negative   Current contraception: post menopausal status and vasectomy    Applying [place] Precautions I properly identified the patient and sought her signature with informed consent.  The reason for the biopsy was discussed.  Using a betadine prep on the cervix the cervix was grasped with a tenaculum and the uterus sounded to 7 cm.  A disposable Pipelle was used to retrieve the specimen by passing it 5 times into the cavity hoping to sample more than one area.     Additional procedures necessary were not necessary  The specimen was labelled and placed in a formalin container.  Tissue was scant despite good sampling  The patient tolerated the procedure    Instructions were given on vaginal rest, OTC tylenol, Motrin or Aleve, and expected future bleeding.  Resulting and follow up were discussed.        A/P:  1.  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 S/P endo bx. If VB recurs, will need HS D&C.   2. MMG UTD 9/2020- B1. Schedule MMG now  3. C scope UTD 9/2020- repeat in 10 years  4. Enc pt to schedule annual   5.Time Spent: I spent > 30 minutes caring for Eugenie on this date of service. This time includes time spent by me in the following activities: preparing for the visit, reviewing tests, obtaining and/or reviewing a separately obtained history, performing a medically appropriate examination and/or evaluation, counseling and educating the patient/family/caregiver, ordering medications, tests, or procedures, documenting information in the medical record and independently interpreting results and communicating that information with the patient/family/caregiver.       Assessment & Plan    Diagnoses and all orders for this visit:    1. PMB (postmenopausal bleeding) (Primary)  -     POC Urinalysis Dipstick  -     Reference Histopathology    2. Encounter for screening mammogram for malignant neoplasm of breast  -     Mammo Screening Bilateral With CAD; Future                 No follow-ups on file.      Becka Barfield MD    10/19/2022  14:14 EDT

## 2022-10-21 LAB
DX ICD CODE: NORMAL
DX ICD CODE: NORMAL
PATH REPORT.FINAL DX SPEC: NORMAL
PATH REPORT.GROSS SPEC: NORMAL
PATH REPORT.SITE OF ORIGIN SPEC: NORMAL
PATHOLOGIST NAME: NORMAL
PAYMENT PROCEDURE: NORMAL

## 2022-11-01 ENCOUNTER — TRANSCRIBE ORDERS (OUTPATIENT)
Dept: ADMINISTRATIVE | Facility: HOSPITAL | Age: 52
End: 2022-11-01

## 2022-11-01 DIAGNOSIS — Z12.31 VISIT FOR SCREENING MAMMOGRAM: Primary | ICD-10-CM

## 2022-11-22 RX ORDER — MELOXICAM 15 MG/1
TABLET ORAL
Qty: 90 TABLET | Refills: 1 | Status: SHIPPED | OUTPATIENT
Start: 2022-11-22

## 2022-11-22 NOTE — TELEPHONE ENCOUNTER
Rx Refill Note  Requested Prescriptions     Pending Prescriptions Disp Refills   • meloxicam (MOBIC) 15 MG tablet [Pharmacy Med Name: MELOXICAM 15 MG TABLET] 90 tablet 1     Sig: TAKE ONE TABLET BY MOUTH DAILY AS NEEDED FOR MODERATE PAIN      Last office visit with prescribing clinician: 3/30/2022      Next office visit with prescribing clinician: 4/25/2023            Dread Hernadez MA  11/22/22, 13:12 EST

## 2022-12-08 ENCOUNTER — APPOINTMENT (OUTPATIENT)
Dept: OTHER | Facility: HOSPITAL | Age: 52
End: 2022-12-08

## 2022-12-08 ENCOUNTER — HOSPITAL ENCOUNTER (OUTPATIENT)
Dept: MAMMOGRAPHY | Facility: HOSPITAL | Age: 52
Discharge: HOME | End: 2022-12-08
Admitting: OBSTETRICS & GYNECOLOGY

## 2022-12-08 DIAGNOSIS — Z92.89 HISTORY OF MAMMOGRAM: ICD-10-CM

## 2022-12-08 DIAGNOSIS — Z12.31 VISIT FOR SCREENING MAMMOGRAM: ICD-10-CM

## 2022-12-08 PROCEDURE — 77067 SCR MAMMO BI INCL CAD: CPT

## 2022-12-08 PROCEDURE — 77063 BREAST TOMOSYNTHESIS BI: CPT | Performed by: RADIOLOGY

## 2022-12-08 PROCEDURE — 77067 SCR MAMMO BI INCL CAD: CPT | Performed by: RADIOLOGY

## 2022-12-08 PROCEDURE — 77063 BREAST TOMOSYNTHESIS BI: CPT

## 2022-12-19 ENCOUNTER — OFFICE VISIT (OUTPATIENT)
Dept: OBSTETRICS AND GYNECOLOGY | Facility: CLINIC | Age: 52
End: 2022-12-19

## 2022-12-19 VITALS
HEIGHT: 69 IN | BODY MASS INDEX: 31.7 KG/M2 | WEIGHT: 214 LBS | DIASTOLIC BLOOD PRESSURE: 72 MMHG | SYSTOLIC BLOOD PRESSURE: 122 MMHG

## 2022-12-19 DIAGNOSIS — Z01.419 ROUTINE GYNECOLOGICAL EXAMINATION: ICD-10-CM

## 2022-12-19 DIAGNOSIS — Z87.42 HISTORY OF POSTMENOPAUSAL BLEEDING: ICD-10-CM

## 2022-12-19 DIAGNOSIS — Z01.419 PAP SMEAR, LOW-RISK: Primary | ICD-10-CM

## 2022-12-19 DIAGNOSIS — Z12.31 ENCOUNTER FOR SCREENING MAMMOGRAM FOR MALIGNANT NEOPLASM OF BREAST: ICD-10-CM

## 2022-12-19 DIAGNOSIS — Z11.51 SPECIAL SCREENING EXAMINATION FOR HUMAN PAPILLOMAVIRUS (HPV): ICD-10-CM

## 2022-12-19 LAB
BILIRUB BLD-MCNC: NEGATIVE MG/DL
CLARITY, POC: CLEAR
COLOR UR: YELLOW
GLUCOSE UR STRIP-MCNC: NEGATIVE MG/DL
KETONES UR QL: NEGATIVE
LEUKOCYTE EST, POC: NEGATIVE
NITRITE UR-MCNC: NEGATIVE MG/ML
PH UR: 5 [PH] (ref 5–8)
PROT UR STRIP-MCNC: NEGATIVE MG/DL
RBC # UR STRIP: NORMAL /UL
SP GR UR: 1 (ref 1–1.03)
UROBILINOGEN UR QL: NORMAL

## 2022-12-19 PROCEDURE — 81002 URINALYSIS NONAUTO W/O SCOPE: CPT | Performed by: OBSTETRICS & GYNECOLOGY

## 2022-12-19 PROCEDURE — 99396 PREV VISIT EST AGE 40-64: CPT | Performed by: OBSTETRICS & GYNECOLOGY

## 2022-12-19 RX ORDER — TRAMADOL HYDROCHLORIDE 50 MG/1
TABLET ORAL EVERY 12 HOURS SCHEDULED
COMMUNITY

## 2022-12-19 NOTE — PROGRESS NOTES
"GYN Annual Exam     CC- Here for annual exam.     Eugenie Rapp is a 52 y.o. female est pt who presents for annual well woman exam.. 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 She had an episode of  VB in  and her endo bx was normal. We discussed that if she has another episode of  VB she will need a D&C.     OB History        4    Para   3    Term   3            AB   1    Living   3       SAB   1    IAB        Ectopic        Molar        Multiple        Live Births              Obstetric Comments   3   1 SAB  No D&C             Menarche: 13  Menopause: 47  HRT: none  Current contraception: post menopausal status and vasectomy  History of abnormal Pap smear: no  History of abnormal mammogram: no  Family history of uterine, colon or ovarian cancer: no  Family history of breast cancer: no  H/o STDs: none  Gardasil: none   Last pap: 3/2019- normal pap/HPV  H/O \"ministroke\", can't take HRT    Health Maintenance   Topic Date Due   • TDAP/TD VACCINES (1 - Tdap) Never done   • Annual Gynecologic Pelvic and Breast Exam  2021   • PAP SMEAR  2022   • LIPID PANEL  2023   • ANNUAL PHYSICAL  2023   • MAMMOGRAM  2024   • COLORECTAL CANCER SCREENING  2030   • COVID-19 Vaccine  Completed   • INFLUENZA VACCINE  Completed   • ZOSTER VACCINE  Completed   • Pneumococcal Vaccine 0-64  Aged Out   • HEPATITIS C SCREENING  Discontinued       Past Medical History:   Diagnosis Date   • Anemia 25-35 years ago   • Anxiety    • Asthma    • Depression    • Fibromyalgia    • GERD (gastroesophageal reflux disease)    • GERD (gastroesophageal reflux disease)    • Hemorrhoid    • Hyperlipidemia    • Left knee pain    • Low back pain    • Migraines     with aura   • Other abnormal clinical finding     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/4   • Partial blindness     LEFT EYE <1/4 VISUAL FIELD AFFECTED FROM " MICROSTROKE TO RETINA   • Plantar fasciitis     BILAT FEET   • PMS (premenstrual syndrome)    • PONV (postoperative nausea and vomiting)     VOMITS ALL DAY   • Retinal thrombosis    • Sciatica of left side    • Seasonal allergies    • Stroke (HCC) 15 years ago    Micro stroke left retina   • Tennis elbow     LEFT   • Urinary tract infection ~10 years ago    One time   • Varicella Childhood   • Vitamin D deficiency        Past Surgical History:   Procedure Laterality Date   • CHOLECYSTECTOMY     • COLONOSCOPY     • KNEE CARTILAGE SURGERY     • LAPAROSCOPIC CHOLECYSTECTOMY  ~ 14 years   • LASIK     • POSTERIOR VAGINAL REPAIR     • TENNIS ELBOW RELEASE Left 10/03/2017    Procedure: LEFT ELBOW OPEN EXTENSOR TENDON DEBRIDEMENT AND REPAIR;  Surgeon: TASH Aly MD;  Location: [facility];  Service:    • ULNAR NERVE DECOMPRESSION Left    • WISDOM TOOTH EXTRACTION           Current Outpatient Medications:   •  albuterol sulfate  (90 Base) MCG/ACT inhaler, Inhale 2 puffs Every 6 (Six) Hours As Needed for Wheezing., Disp: 18 g, Rfl: 1  •  BLACK COHOSH PO, Take  by mouth., Disp: , Rfl:   •  buPROPion XL (WELLBUTRIN XL) 300 MG 24 hr tablet, TAKE ONE TABLET BY MOUTH EVERY MORNING, Disp: 90 tablet, Rfl: 4  •  busPIRone (BUSPAR) 15 MG tablet, TAKE ONE TABLET BY MOUTH TWICE A DAY, Disp: 180 tablet, Rfl: 1  •  CAFFEINE PO, Take  by mouth., Disp: , Rfl:   •  cetirizine (ZyrTEC) 10 MG tablet, Take 10 mg by mouth Every Night., Disp: , Rfl:   •  Cholecalciferol (VITAMIN D-3 PO), Take 4,000 Units by mouth Daily., Disp: , Rfl:   •  famotidine (PEPCID) 40 MG tablet, TAKE ONE TABLET BY MOUTH EVERY NIGHT AT BEDTIME, Disp: 90 tablet, Rfl: 4  •  gabapentin (NEURONTIN) 600 MG tablet, TAKE ONE TABLET BY MOUTH THREE TIMES A DAY, Disp: 270 tablet, Rfl: 0  •  lidocaine (LIDODERM) 5 %, APPLY 1 PATCH TO AFFECTED AREA FOR 12 HOURS IN A 24 HOUR PERIOD, Disp: 30 patch, Rfl: 6  •  melatonin 5 MG tablet tablet, melatonin 5 mg tablet  Take by  "oral route., Disp: , Rfl:   •  meloxicam (MOBIC) 15 MG tablet, TAKE ONE TABLET BY MOUTH DAILY AS NEEDED FOR MODERATE PAIN, Disp: 90 tablet, Rfl: 1  •  pantoprazole (PROTONIX) 40 MG EC tablet, TAKE ONE TABLET BY MOUTH DAILY, Disp: 90 tablet, Rfl: 4  •  traMADol (ULTRAM) 50 MG tablet, Every 12 (Twelve) Hours., Disp: , Rfl:     Allergies   Allergen Reactions   • Promethazine Other (See Comments)     EPS SYMPTOMS   • Codeine Rash   • Penicillins Rash   • Sulfa Antibiotics Rash       Social History     Tobacco Use   • Smoking status: Never   • Smokeless tobacco: Never   Vaping Use   • Vaping Use: Never used   Substance Use Topics   • Alcohol use: No     Comment: RARE MONTHLY   • Drug use: Never       Family History   Problem Relation Age of Onset   • Leukemia Brother    • Hypertension Mother    • Stroke Father    • Hypertension Father    • Malig Hyperthermia Neg Hx    • Breast cancer Neg Hx    • Ovarian cancer Neg Hx    • Colon cancer Neg Hx    • Deep vein thrombosis Neg Hx    • Pulmonary embolism Neg Hx        Review of Systems   Constitutional: Positive for activity change. Negative for appetite change, fatigue, fever and unexpected weight change.   Eyes: Negative for photophobia and visual disturbance.   Respiratory: Negative for cough and shortness of breath.    Cardiovascular: Negative for chest pain and palpitations.   Gastrointestinal: Negative for abdominal distention and abdominal pain.   Endocrine: Negative for cold intolerance and heat intolerance.   Genitourinary: Negative for dyspareunia, dysuria, menstrual problem, pelvic pain, vaginal bleeding and vaginal discharge.   Skin: Negative for color change, rash and wound.   Neurological: Negative for headaches.   Hematological: Negative for adenopathy. Does not bruise/bleed easily.   Psychiatric/Behavioral: Negative for dysphoric mood. The patient is not nervous/anxious.        /72   Ht 175.3 cm (69\")   Wt 97.1 kg (214 lb)   LMP  (LMP Unknown)   " Breastfeeding No   BMI 31.60 kg/m²     Physical Exam   Constitutional: She is oriented to person, place, and time. She appears well-developed.   HENT:   Head: Normocephalic and atraumatic.   Eyes: Conjunctivae are normal. No scleral icterus.   Neck: No thyromegaly present.   Cardiovascular: Normal rate and regular rhythm.   Pulmonary/Chest: Effort normal and breath sounds normal. Right breast exhibits no inverted nipple, no mass, no nipple discharge, no skin change and no tenderness. Left breast exhibits no inverted nipple, no mass, no nipple discharge, no skin change and no tenderness.   Abdominal: Soft. Normal appearance and bowel sounds are normal. She exhibits no distension and no mass. There is no abdominal tenderness. There is no rebound and no guarding. No hernia.   Genitourinary:    Pelvic exam was performed with patient supine.   There is no rash, tenderness or lesion on the right labia. There is no rash, tenderness or lesion on the left labia. Uterus is not deviated, not enlarged, not fixed and not tender. Cervix exhibits no motion tenderness, no discharge and no friability. Right adnexum displays no mass, no tenderness and no fullness. Left adnexum displays no mass, no tenderness and no fullness.    No vaginal discharge, erythema, tenderness or bleeding.   No erythema, tenderness or bleeding in the vagina.    No foreign body in the vagina.      No signs of injury in the vagina.     Neurological: She is alert and oriented to person, place, and time.   Skin: Skin is warm and dry.   Psychiatric: Her behavior is normal. Mood, judgment and thought content normal.   Nursing note and vitals reviewed.         Assessment/Plan    1) GYN HM: normal pap/HPV  3/2019, check pap/HPV SBE demonstrated and encouraged.  2) STD screening: declines Condoms encouraged.  3) Contraception: vas,    4) Family Planning: no plans, encourage folic acid daily  5) Diet and Exercise discussed  6) Smoking Status: non smoker  7)  Social:   8) MMG- UTD 12/2022, B1. 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   11) DEXA- plan age 54  12) H/O  VB- normal US and endo bx. If recurs, proceed with D&C  13) Parts of this document have been copied or forwarded from her previous visits and have been reviewed, updated and edited as indicated.   14) I saw the patient with a face mask, gloves and eye protection  The patient herself was masked.  Social distancing was observed as appropriate.  15) RTO 1 year annual and/or prn.     Diagnoses and all orders for this visit:    1. Pap smear, low-risk (Primary)  -     POC Urinalysis Dipstick  -     IGP, Apt HPV,rfx 16 / 18,45    2. Routine gynecological examination  -     POC Urinalysis Dipstick  -     IGP, Apt HPV,rfx 16 / 18,45    3. Special screening examination for human papillomavirus (HPV)    4. Encounter for screening mammogram for malignant neoplasm of breast  -     Mammo Screening Bilateral With CAD; Future    5. History of postmenopausal bleeding          Becka Barfield MD  12/19/2022  10:27 EST

## 2022-12-20 DIAGNOSIS — M79.7 FIBROMYALGIA: ICD-10-CM

## 2022-12-20 NOTE — TELEPHONE ENCOUNTER
Rx Refill Note  Requested Prescriptions     Pending Prescriptions Disp Refills   • gabapentin (NEURONTIN) 600 MG tablet [Pharmacy Med Name: GABAPENTIN 600 MG TABLET] 270 tablet      Sig: TAKE ONE TABLET BY MOUTH THREE TIMES A DAY      Last office visit with prescribing clinician: 3/30/2022   Last telemedicine visit with prescribing clinician: 4/25/2023   Next office visit with prescribing clinician: 4/25/2023                         Would you like a call back once the refill request has been completed: [] Yes [] No    If the office needs to give you a call back, can they leave a voicemail: [] Yes [] No    Dread Hernadez MA  12/20/22, 16:58 EST     Updated contract

## 2022-12-21 RX ORDER — GABAPENTIN 600 MG/1
TABLET ORAL
Qty: 270 TABLET | Refills: 0 | Status: SHIPPED | OUTPATIENT
Start: 2022-12-21 | End: 2023-03-13

## 2022-12-29 LAB
CYTOLOGIST CVX/VAG CYTO: NORMAL
CYTOLOGY CVX/VAG DOC CYTO: NORMAL
CYTOLOGY CVX/VAG DOC THIN PREP: NORMAL
DX ICD CODE: NORMAL
HIV 1 & 2 AB SER-IMP: NORMAL
HPV I/H RISK 4 DNA CVX QL PROBE+SIG AMP: NEGATIVE
OTHER STN SPEC: NORMAL
STAT OF ADQ CVX/VAG CYTO-IMP: NORMAL

## 2023-01-01 NOTE — TELEPHONE ENCOUNTER
Aguiar she need refill on naprosyn 500 mg bid?  She just refilled in 9/22/20--I usually don't like people to stay on that type of anti inflammatory long term.  Prn is ok.  Or maybe mobic 15 mg qd instead of naprosyn   Problem: PAIN -   Goal: Displays adequate comfort level or baseline comfort level  Description: INTERVENTIONS:  - Perform pain scoring using age-appropriate tool with hands-on care as needed  Notify physician/AP of high pain scores not responsive to comfort measures  - Administer analgesics based on type and severity of pain and evaluate response  - Sucrose analgesia per protocol for brief minor painful procedures  - Teach parents interventions for comforting infant  Outcome: Progressing     Problem: THERMOREGULATION - PEDIATRICS  Goal: Maintains normal body temperature  Description: Interventions:  - Monitor temperature (axillary for Newborns) as ordered  - Monitor for signs of hypothermia or hyperthermia  - Provide thermal support measures  - Wean to open crib when appropriate  Outcome: Progressing     Problem: INFECTION -   Goal: No evidence of infection  Description: INTERVENTIONS:  - Instruct family/visitors to use good hand hygiene technique  - Identify and instruct in appropriate isolation precautions for identified infection/condition  - Change incubator every 2 weeks or as needed  - Monitor for symptoms of infection  - Monitor surgical sites and insertion sites for all indwelling lines, tubes, and drains for drainage, redness, or edema   - Monitor endotracheal and nasal secretions for changes in amount and color  - Monitor culture and CBC results  - Administer antibiotics as ordered    Monitor drug levels  Outcome: Progressing     Problem: SAFETY -   Goal: Patient will remain free from falls  Description: INTERVENTIONS:  - Instruct family/caregiver on patient safety  - Keep incubator doors and portholes closed when unattended  - Keep radiant warmer side rails and crib rails up when unattended  - Based on caregiver fall risk screen, instruct family/caregiver to ask for assistance with transferring infant if caregiver noted to have fall risk factors  Outcome: Progressing     Problem: Knowledge Deficit  Goal: Patient/family/caregiver demonstrates understanding of disease process, treatment plan, medications, and discharge instructions  Description: Complete learning assessment and assess knowledge base  Interventions:  - Provide teaching at level of understanding  - Provide teaching via preferred learning methods  Outcome: Progressing  Goal: Infant caregiver verbalizes understanding of benefits of skin-to-skin with healthy   Description: Prior to delivery, educate patient regarding skin-to-skin practice and its benefits  Initiate immediate and uninterrupted skin-to-skin contact after birth until breastfeeding is initiated or a minimum of one hour  Encourage continued skin-to-skin contact throughout the post partum stay    Outcome: Progressing  Goal: Infant caregiver verbalizes understanding of benefits and management of breastfeeding their healthy   Description: Help initiate breastfeeding within one hour of birth  Educate/assist with breastfeeding positioning and latch  Educate on safe positioning and to monitor their  for safety  Educate on how to maintain lactation even if they are  from their   Educate/initiate pumping for a mom with a baby in the NICU within 6 hours after birth  Give infants no food or drink other than breast milk unless medically indicated  Educate on feeding cues and encourage breastfeeding on demand    Outcome: Progressing  Goal: Infant caregiver verbalizes understanding of benefits to rooming-in with their healthy   Description: Promote rooming in 23 out of 24 hours per day  Educate on benefits to rooming-in  Provide  care in room with parents as long as infant and mother condition allow    Outcome: Progressing  Goal: Infant caregiver verbalizes understanding of support and resources for follow up after discharge  Description: Provide individual discharge education on when to call the doctor    Provide resources and contact information for post-discharge support      Outcome: Progressing     Problem: DISCHARGE PLANNING  Goal: Discharge to home or other facility with appropriate resources  Description: INTERVENTIONS:  - Identify barriers to discharge w/patient and caregiver  - Arrange for needed discharge resources and transportation as appropriate  - Identify discharge learning needs (meds, wound care, etc )  - Arrange for interpretive services to assist at discharge as needed  - Refer to Case Management Department for coordinating discharge planning if the patient needs post-hospital services based on physician/advanced practitioner order or complex needs related to functional status, cognitive ability, or social support system  Outcome: Progressing     Problem: NORMAL   Goal: Experiences normal transition  Description: INTERVENTIONS:  - Monitor vital signs  - Maintain thermoregulation  - Assess for hypoglycemia risk factors or signs and symptoms  - Assess for sepsis risk factors or signs and symptoms  - Assess for jaundice risk and/or signs and symptoms  Outcome: Progressing  Goal: Total weight loss less than 10% of birth weight  Description: INTERVENTIONS:  - Assess feeding patterns  - Weigh daily  Outcome: Progressing     Problem: Adequate NUTRIENT INTAKE -   Goal: Nutrient/Hydration intake appropriate for improving, restoring or maintaining nutritional needs  Description: INTERVENTIONS:  - Assess growth and nutritional status of patients and recommend course of action  - Monitor nutrient intake, labs, and treatment plans  - Recommend appropriate diets and vitamin/mineral supplements  - Monitor and recommend adjustments to tube feedings and TPN/PPN based on assessed needs  - Provide specific nutrition education as appropriate  Outcome: Progressing  Goal: Breast feeding baby will demonstrate adequate intake  Description: Interventions:  - Monitor/record daily weights and I&O  - Monitor milk transfer  - Increase maternal fluid intake  - Increase breastfeeding frequency and duration  - Teach mother to massage breast before feeding/during infant pauses during feeding  - Pump breast after feeding  - Review breastfeeding discharge plan with mother   Refer to breast feeding support groups  - Initiate discussion/inform physician of weight loss and interventions taken  - Help mother initiate breast feeding within an hour of birth  - Encourage skin to skin time with  within 5 minutes of birth  - Give  no food or drink other than breast milk  - Encourage rooming in  - Encourage breast feeding on demand  - Initiate SLP consult as needed  Outcome: Progressing

## 2023-01-30 NOTE — TELEPHONE ENCOUNTER
Rx Refill Note  Requested Prescriptions     Pending Prescriptions Disp Refills   • busPIRone (BUSPAR) 15 MG tablet [Pharmacy Med Name: BUSPIRONE HCL 15 MG TABLET] 180 tablet 1     Sig: TAKE ONE TABLET BY MOUTH TWICE A DAY      Last office visit with prescribing clinician: 3/30/2022   Last telemedicine visit with prescribing clinician: 4/25/2023   Next office visit with prescribing clinician: 4/25/2023                         Would you like a call back once the refill request has been completed: [] Yes [] No    If the office needs to give you a call back, can they leave a voicemail: [] Yes [] No    Dread Hernadez MA  01/30/23, 11:26 EST

## 2023-01-31 RX ORDER — BUSPIRONE HYDROCHLORIDE 15 MG/1
TABLET ORAL
Qty: 180 TABLET | Refills: 1 | Status: SHIPPED | OUTPATIENT
Start: 2023-01-31

## 2023-02-20 RX ORDER — LIDOCAINE 50 MG/G
1 PATCH TOPICAL SEE ADMIN INSTRUCTIONS
Qty: 30 PATCH | Refills: 6 | Status: SHIPPED | OUTPATIENT
Start: 2023-02-20

## 2023-02-20 NOTE — TELEPHONE ENCOUNTER
Rx Refill Note  Requested Prescriptions     Pending Prescriptions Disp Refills   • lidocaine (LIDODERM) 5 % 30 patch 6     Sig: Place 1 patch on the skin as directed by provider See Admin Instructions. Apply 1 patch to affected area for 12 hours in a 24 hour period.      Last office visit with prescribing clinician: 3/30/2022   Last telemedicine visit with prescribing clinician: 4/25/2023   Next office visit with prescribing clinician: 4/25/2023                         Would you like a call back once the refill request has been completed: [] Yes [] No    If the office needs to give you a call back, can they leave a voicemail: [] Yes [] No    Dread Hernadez MA  02/20/23, 16:02 EST

## 2023-03-11 DIAGNOSIS — M79.7 FIBROMYALGIA: ICD-10-CM

## 2023-03-13 RX ORDER — GABAPENTIN 600 MG/1
TABLET ORAL
Qty: 270 TABLET | Refills: 0 | Status: SHIPPED | OUTPATIENT
Start: 2023-03-13

## 2023-03-13 NOTE — TELEPHONE ENCOUNTER
Rx Refill Note  Requested Prescriptions     Pending Prescriptions Disp Refills   • gabapentin (NEURONTIN) 600 MG tablet [Pharmacy Med Name: GABAPENTIN 600 MG TABLET] 270 tablet 0     Sig: TAKE ONE TABLET BY MOUTH THREE TIMES A DAY      Last office visit with prescribing clinician: 3/30/2022   Last telemedicine visit with prescribing clinician: 4/25/2023   Next office visit with prescribing clinician: 4/25/2023                         Would you like a call back once the refill request has been completed: [] Yes [] No    If the office needs to give you a call back, can they leave a voicemail: [] Yes [] No    Dread Hernadez MA  03/13/23, 13:39 EDT     Updated contract

## 2023-04-17 ENCOUNTER — TELEPHONE (OUTPATIENT)
Dept: FAMILY MEDICINE CLINIC | Facility: CLINIC | Age: 53
End: 2023-04-17
Payer: COMMERCIAL

## 2023-04-17 DIAGNOSIS — Z00.00 WELL ADULT EXAM: Primary | ICD-10-CM

## 2023-04-21 ENCOUNTER — LAB (OUTPATIENT)
Dept: LAB | Facility: HOSPITAL | Age: 53
End: 2023-04-21
Payer: COMMERCIAL

## 2023-04-21 DIAGNOSIS — Z00.00 WELL ADULT EXAM: ICD-10-CM

## 2023-04-21 LAB
ALBUMIN SERPL-MCNC: 4.8 G/DL (ref 3.5–5.2)
ALBUMIN/GLOB SERPL: 1.8 G/DL
ALP SERPL-CCNC: 66 U/L (ref 39–117)
ALT SERPL W P-5'-P-CCNC: 11 U/L (ref 1–33)
ANION GAP SERPL CALCULATED.3IONS-SCNC: 10 MMOL/L (ref 5–15)
AST SERPL-CCNC: 21 U/L (ref 1–32)
BASOPHILS # BLD AUTO: 0.06 10*3/MM3 (ref 0–0.2)
BASOPHILS NFR BLD AUTO: 1 % (ref 0–1.5)
BILIRUB SERPL-MCNC: 0.5 MG/DL (ref 0–1.2)
BUN SERPL-MCNC: 24 MG/DL (ref 6–20)
BUN/CREAT SERPL: 23.1 (ref 7–25)
CALCIUM SPEC-SCNC: 9.4 MG/DL (ref 8.6–10.5)
CHLORIDE SERPL-SCNC: 105 MMOL/L (ref 98–107)
CO2 SERPL-SCNC: 25 MMOL/L (ref 22–29)
CREAT SERPL-MCNC: 1.04 MG/DL (ref 0.57–1)
DEPRECATED RDW RBC AUTO: 40.3 FL (ref 37–54)
EGFRCR SERPLBLD CKD-EPI 2021: 64.4 ML/MIN/1.73
EOSINOPHIL # BLD AUTO: 0.19 10*3/MM3 (ref 0–0.4)
EOSINOPHIL NFR BLD AUTO: 3.2 % (ref 0.3–6.2)
ERYTHROCYTE [DISTWIDTH] IN BLOOD BY AUTOMATED COUNT: 12.5 % (ref 12.3–15.4)
GLOBULIN UR ELPH-MCNC: 2.6 GM/DL
GLUCOSE SERPL-MCNC: 70 MG/DL (ref 65–99)
HCT VFR BLD AUTO: 39.9 % (ref 34–46.6)
HGB BLD-MCNC: 14 G/DL (ref 12–15.9)
IMM GRANULOCYTES # BLD AUTO: 0.01 10*3/MM3 (ref 0–0.05)
IMM GRANULOCYTES NFR BLD AUTO: 0.2 % (ref 0–0.5)
LYMPHOCYTES # BLD AUTO: 2.17 10*3/MM3 (ref 0.7–3.1)
LYMPHOCYTES NFR BLD AUTO: 36.8 % (ref 19.6–45.3)
MCH RBC QN AUTO: 31.2 PG (ref 26.6–33)
MCHC RBC AUTO-ENTMCNC: 35.1 G/DL (ref 31.5–35.7)
MCV RBC AUTO: 88.9 FL (ref 79–97)
MONOCYTES # BLD AUTO: 0.44 10*3/MM3 (ref 0.1–0.9)
MONOCYTES NFR BLD AUTO: 7.5 % (ref 5–12)
NEUTROPHILS NFR BLD AUTO: 3.02 10*3/MM3 (ref 1.7–7)
NEUTROPHILS NFR BLD AUTO: 51.3 % (ref 42.7–76)
NRBC BLD AUTO-RTO: 0 /100 WBC (ref 0–0.2)
PLATELET # BLD AUTO: 311 10*3/MM3 (ref 140–450)
PMV BLD AUTO: 9.6 FL (ref 6–12)
POTASSIUM SERPL-SCNC: 4.4 MMOL/L (ref 3.5–5.2)
PROT SERPL-MCNC: 7.4 G/DL (ref 6–8.5)
RBC # BLD AUTO: 4.49 10*6/MM3 (ref 3.77–5.28)
SODIUM SERPL-SCNC: 140 MMOL/L (ref 136–145)
T4 FREE SERPL-MCNC: 1.29 NG/DL (ref 0.93–1.7)
TSH SERPL DL<=0.05 MIU/L-ACNC: 1.62 UIU/ML (ref 0.27–4.2)
WBC NRBC COR # BLD: 5.89 10*3/MM3 (ref 3.4–10.8)

## 2023-04-21 PROCEDURE — 80050 GENERAL HEALTH PANEL: CPT

## 2023-04-21 PROCEDURE — 80061 LIPID PANEL: CPT

## 2023-04-21 PROCEDURE — 84439 ASSAY OF FREE THYROXINE: CPT

## 2023-04-21 PROCEDURE — 81003 URINALYSIS AUTO W/O SCOPE: CPT

## 2023-04-21 PROCEDURE — 36415 COLL VENOUS BLD VENIPUNCTURE: CPT

## 2023-04-22 LAB
BILIRUB UR QL STRIP: NEGATIVE
CHOLEST SERPL-MCNC: 251 MG/DL (ref 0–200)
CLARITY UR: ABNORMAL
COLOR UR: YELLOW
GLUCOSE UR STRIP-MCNC: NEGATIVE MG/DL
HDLC SERPL-MCNC: 64 MG/DL (ref 40–60)
HGB UR QL STRIP.AUTO: NEGATIVE
KETONES UR QL STRIP: NEGATIVE
LDLC SERPL CALC-MCNC: 172 MG/DL (ref 0–100)
LDLC/HDLC SERPL: 2.64 {RATIO}
LEUKOCYTE ESTERASE UR QL STRIP.AUTO: NEGATIVE
NITRITE UR QL STRIP: NEGATIVE
PH UR STRIP.AUTO: 5.5 [PH] (ref 5–8)
PROT UR QL STRIP: NEGATIVE
SP GR UR STRIP: 1.02 (ref 1–1.03)
TRIGL SERPL-MCNC: 89 MG/DL (ref 0–150)
UROBILINOGEN UR QL STRIP: ABNORMAL
VLDLC SERPL-MCNC: 15 MG/DL (ref 5–40)

## 2023-05-15 RX ORDER — MELOXICAM 15 MG/1
TABLET ORAL
Qty: 90 TABLET | Refills: 1 | Status: SHIPPED | OUTPATIENT
Start: 2023-05-15

## 2023-05-15 RX ORDER — TRAZODONE HYDROCHLORIDE 50 MG/1
TABLET ORAL
Qty: 90 TABLET | Refills: 1 | Status: SHIPPED | OUTPATIENT
Start: 2023-05-15

## 2023-05-15 RX ORDER — PANTOPRAZOLE SODIUM 40 MG/1
TABLET, DELAYED RELEASE ORAL
Qty: 90 TABLET | Refills: 4 | Status: SHIPPED | OUTPATIENT
Start: 2023-05-15

## 2023-05-15 RX ORDER — FAMOTIDINE 40 MG/1
TABLET, FILM COATED ORAL
Qty: 90 TABLET | Refills: 4 | Status: SHIPPED | OUTPATIENT
Start: 2023-05-15

## 2023-05-15 RX ORDER — BUPROPION HYDROCHLORIDE 300 MG/1
TABLET ORAL
Qty: 90 TABLET | Refills: 4 | Status: SHIPPED | OUTPATIENT
Start: 2023-05-15

## 2023-05-15 NOTE — TELEPHONE ENCOUNTER
Rx Refill Note  Requested Prescriptions     Pending Prescriptions Disp Refills   • famotidine (PEPCID) 40 MG tablet [Pharmacy Med Name: FAMOTIDINE 40 MG TABLET] 90 tablet 4     Sig: TAKE ONE TABLET BY MOUTH EVERY NIGHT AT BEDTIME   • buPROPion XL (WELLBUTRIN XL) 300 MG 24 hr tablet [Pharmacy Med Name: buPROPion HCL  MG TABLET] 90 tablet 4     Sig: TAKE ONE TABLET BY MOUTH EVERY MORNING   • traZODone (DESYREL) 50 MG tablet [Pharmacy Med Name: traZODone 50 MG TABLET] 90 tablet      Sig: TAKE ONE TABLET BY MOUTH ONCE NIGHTLY   • pantoprazole (PROTONIX) 40 MG EC tablet [Pharmacy Med Name: PANTOPRAZOLE SOD DR 40 MG TAB] 90 tablet 4     Sig: TAKE ONE TABLET BY MOUTH DAILY   • meloxicam (MOBIC) 15 MG tablet [Pharmacy Med Name: MELOXICAM 15 MG TABLET] 90 tablet 1     Sig: TAKE ONE TABLET BY MOUTH DAILY AS NEEDED FOR MODERATE PAIN      Last office visit with prescribing clinician: 3/30/2022   Last telemedicine visit with prescribing clinician: 4/17/2023   Next office visit with prescribing clinician: 5/24/2023                         Would you like a call back once the refill request has been completed: [] Yes [] No    If the office needs to give you a call back, can they leave a voicemail: [] Yes [] No    Taiwo Cai CMA  05/15/23, 09:14 EDT

## 2023-05-24 ENCOUNTER — OFFICE VISIT (OUTPATIENT)
Dept: FAMILY MEDICINE CLINIC | Facility: CLINIC | Age: 53
End: 2023-05-24
Payer: COMMERCIAL

## 2023-05-24 VITALS
BODY MASS INDEX: 30.81 KG/M2 | WEIGHT: 208 LBS | TEMPERATURE: 97.7 F | HEART RATE: 70 BPM | SYSTOLIC BLOOD PRESSURE: 128 MMHG | OXYGEN SATURATION: 98 % | HEIGHT: 69 IN | RESPIRATION RATE: 18 BRPM | DIASTOLIC BLOOD PRESSURE: 70 MMHG

## 2023-05-24 DIAGNOSIS — G47.00 INSOMNIA, UNSPECIFIED TYPE: ICD-10-CM

## 2023-05-24 DIAGNOSIS — Z00.00 WELL ADULT EXAM: ICD-10-CM

## 2023-05-24 DIAGNOSIS — M79.7 FIBROMYALGIA: Primary | ICD-10-CM

## 2023-05-24 RX ORDER — ALBUTEROL SULFATE 90 UG/1
2 AEROSOL, METERED RESPIRATORY (INHALATION) EVERY 6 HOURS PRN
Qty: 18 G | Refills: 1 | Status: SHIPPED | OUTPATIENT
Start: 2023-05-24

## 2023-05-24 NOTE — PROGRESS NOTES
"Chief Complaint  Annual Exam (Physical)    Subjective        Eugenie Rapp presents to [facility]  History of Present Illness  Here for CPE.  Has had a busy month with traveling and selling her home and moving into apartment and will start watching her new grandson in the fall.  Mentally she is feeling well and no complaints.  She still has aches and pains in her joints.  She uses lidoderm patches prn and takes gabapentin 600 mg 1 am and 2 pm and wellbutrin  mg qd.  GERD Is controlled with protonix 40 mg qd.  Insomnia is fairly well treated with gabapentin and trazodone.  Still seeing jorge vicente for counseling.    CN and EGD 9/2020 and repeat 2030  Pap smear and MMG were in 12/2022 with Dr. Barfield  She gets vaccinations with [facility] where her  works    Objective   Vital Signs:  /70   Pulse 70   Temp 97.7 °F (36.5 °C)   Resp 18   Ht 175.3 cm (69\")   Wt 94.3 kg (208 lb)   SpO2 98%   BMI 30.72 kg/m²   Estimated body mass index is 30.72 kg/m² as calculated from the following:    Height as of this encounter: 175.3 cm (69\").    Weight as of this encounter: 94.3 kg (208 lb).     The 10-year ASCVD risk score (Agueda SHRESTHA, et al., 2019) is: 1.8%    Values used to calculate the score:      Age: 52 years      Sex: Female      Is Non- : No      Diabetic: No      Tobacco smoker: No      Systolic Blood Pressure: 128 mmHg      Is BP treated: No      HDL Cholesterol: 64 mg/dL      Total Cholesterol: 251 mg/dL        T4, Free (04/21/2023 10:02)  Comprehensive Metabolic Panel (04/21/2023 10:02)  Lipid Panel With LDL / HDL Ratio (04/21/2023 10:02)  TSH (04/21/2023 10:02)  CBC & Differential (04/21/2023 10:02)  Urinalysis With Microscopic If Indicated (No Culture) - Urine, Clean Catch (04/21/2023 10:02)    Physical Exam  Vitals and nursing note reviewed.   Constitutional:       Appearance: Normal appearance. She is well-developed.   HENT: "      Head: Normocephalic and atraumatic.      Right Ear: External ear normal.      Left Ear: External ear normal.   Eyes:      Extraocular Movements: Extraocular movements intact.      Conjunctiva/sclera: Conjunctivae normal.   Neck:      Vascular: No carotid bruit.   Cardiovascular:      Rate and Rhythm: Normal rate and regular rhythm.      Heart sounds: Normal heart sounds.      Comments: No bruits  Pulmonary:      Effort: Pulmonary effort is normal. No respiratory distress.      Breath sounds: Normal breath sounds. No stridor. No wheezing, rhonchi or rales.   Chest:      Chest wall: No tenderness.   Abdominal:      General: Bowel sounds are normal. There is no distension.      Palpations: Abdomen is soft. There is no mass.      Tenderness: There is no abdominal tenderness. There is no guarding or rebound.      Hernia: No hernia is present.   Musculoskeletal:      Cervical back: Neck supple.   Lymphadenopathy:      Cervical: No cervical adenopathy.   Skin:     General: Skin is warm.   Neurological:      Mental Status: She is alert and oriented to person, place, and time. Mental status is at baseline.   Psychiatric:         Mood and Affect: Mood normal.         Behavior: Behavior normal.         Thought Content: Thought content normal.         Judgment: Judgment normal.        Result Review :                   Assessment and Plan   Diagnoses and all orders for this visit:    1. Fibromyalgia (Primary)    2. Well adult exam  -     Comprehensive Metabolic Panel; Future  -     CBC & Differential; Future  -     Lipid Panel With LDL / HDL Ratio; Future  -     TSH; Future  -     T4, Free; Future  -     Urinalysis With Culture If Indicated -; Future    3. Insomnia, unspecified type    Other orders  -     albuterol sulfate  (90 Base) MCG/ACT inhaler; Inhale 2 puffs Every 6 (Six) Hours As Needed for Wheezing.  Dispense: 18 g; Refill: 1      She is eligible for Tdap and I recommend that she get that ASAP since she is  now a grandmother  Refill albuterol inhaler that she uses prn for URI  Insomnia, fibromyalgia and chronic pain syndrome--continue 600 mg gabapentin in the morning with 2 tablets at night.  She also takes trazodone 50 mg nightly as needed.  Osteoarthritis continue meloxicam 15 mg daily.  Stressed the importance of taking this with food and with 64 ounces of water per day.  Slight elevation in creatinine and BUN most certainly related to being under hydrated as her specific gravity was also elevated on her urinalysis.  She is doing better about drinking her water since seen the blood work.   Hyperlipidemia --dietary restrictions encouraged and discussed with patient.  She does not meet criteria for starting cholesterol-lowering medication at this time.  Increasing HDL will also lower her cardiac risk and can be done through exercise.  Mammogram Pap smear and colonoscopy are all up-to-date.  Vaccinations have been discussed.  Preventative counseling provided.  Depression controlled with Wellbutrin  mg daily along with BuSpar.  Labs ordered for future follow-up visit.       Follow Up   Return in about 1 year (around 5/24/2024).  Patient was given instructions and counseling regarding her condition or for health maintenance advice. Please see specific information pulled into the AVS if appropriate.

## 2023-08-01 RX ORDER — BUSPIRONE HYDROCHLORIDE 15 MG/1
TABLET ORAL
Qty: 180 TABLET | Refills: 1 | Status: SHIPPED | OUTPATIENT
Start: 2023-08-01

## 2023-09-15 DIAGNOSIS — M79.7 FIBROMYALGIA: ICD-10-CM

## 2023-09-15 NOTE — TELEPHONE ENCOUNTER
Rx Refill Note  Requested Prescriptions     Pending Prescriptions Disp Refills    gabapentin (NEURONTIN) 600 MG tablet [Pharmacy Med Name: GABAPENTIN 600 MG TABLET] 270 tablet      Sig: TAKE ONE TABLET BY MOUTH THREE TIMES A DAY      Last office visit with prescribing clinician: 5/24/2023   Last telemedicine visit with prescribing clinician: Visit date not found   Next office visit with prescribing clinician: 6/19/2024                         Would you like a call back once the refill request has been completed: [] Yes [] No    If the office needs to give you a call back, can they leave a voicemail: [] Yes [] No    aJvan Winslow MA  09/15/23, 09:16 EDT

## 2023-09-18 RX ORDER — GABAPENTIN 600 MG/1
TABLET ORAL
Qty: 270 TABLET | Refills: 0 | Status: SHIPPED | OUTPATIENT
Start: 2023-09-18

## 2023-11-27 RX ORDER — MELOXICAM 15 MG/1
15 TABLET ORAL DAILY PRN
Qty: 90 TABLET | Refills: 1 | Status: SHIPPED | OUTPATIENT
Start: 2023-11-27

## 2023-12-11 NOTE — TELEPHONE ENCOUNTER
Rx Refill Note  Requested Prescriptions     Pending Prescriptions Disp Refills    traZODone (DESYREL) 50 MG tablet [Pharmacy Med Name: traZODone 50 MG TABLET] 90 tablet 1     Sig: TAKE ONE TABLET BY MOUTH ONCE NIGHTLY      Last office visit with prescribing clinician: 5/24/2023   Last telemedicine visit with prescribing clinician: Visit date not found   Next office visit with prescribing clinician: 6/19/2024                         Would you like a call back once the refill request has been completed: [] Yes [] No    If the office needs to give you a call back, can they leave a voicemail: [] Yes [] No    Javan Winslow MA  12/11/23, 10:47 EST

## 2023-12-12 RX ORDER — TRAZODONE HYDROCHLORIDE 50 MG/1
TABLET ORAL
Qty: 90 TABLET | Refills: 1 | Status: SHIPPED | OUTPATIENT
Start: 2023-12-12

## 2023-12-21 DIAGNOSIS — M79.7 FIBROMYALGIA: ICD-10-CM

## 2023-12-21 NOTE — TELEPHONE ENCOUNTER
Rx Refill Note  Requested Prescriptions     Pending Prescriptions Disp Refills    gabapentin (NEURONTIN) 600 MG tablet [Pharmacy Med Name: GABAPENTIN 600 MG TABLET] 270 tablet      Sig: TAKE ONE TABLET BY MOUTH THREE TIMES A DAY      Last office visit with prescribing clinician: 5/24/2023   Last telemedicine visit with prescribing clinician: Visit date not found   Next office visit with prescribing clinician: 6/19/2024                         Would you like a call back once the refill request has been completed: [] Yes [] No    If the office needs to give you a call back, can they leave a voicemail: [] Yes [] No    Javan Winslow MA  12/21/23, 09:21 EST

## 2023-12-22 RX ORDER — GABAPENTIN 600 MG/1
TABLET ORAL
Qty: 270 TABLET | Refills: 1 | Status: SHIPPED | OUTPATIENT
Start: 2023-12-22

## 2024-01-22 NOTE — TELEPHONE ENCOUNTER
Rx Refill Note  Requested Prescriptions     Pending Prescriptions Disp Refills    busPIRone (BUSPAR) 15 MG tablet [Pharmacy Med Name: BUSPIRONE HCL 15 MG TABLET] 180 tablet 1     Sig: TAKE 1 TABLET BY MOUTH TWICE A DAY      Last office visit with prescribing clinician: 5/24/2023   Last telemedicine visit with prescribing clinician: Visit date not found   Next office visit with prescribing clinician: 6/19/2024                         Would you like a call back once the refill request has been completed: [] Yes [] No    If the office needs to give you a call back, can they leave a voicemail: [] Yes [] No    Javan Winslow MA  01/22/24, 13:51 EST

## 2024-01-23 RX ORDER — BUSPIRONE HYDROCHLORIDE 15 MG/1
15 TABLET ORAL 2 TIMES DAILY
Qty: 180 TABLET | Refills: 1 | Status: SHIPPED | OUTPATIENT
Start: 2024-01-23

## 2024-02-29 ENCOUNTER — TRANSCRIBE ORDERS (OUTPATIENT)
Dept: GENERAL RADIOLOGY | Facility: HOSPITAL | Age: 54
End: 2024-02-29
Payer: COMMERCIAL

## 2024-02-29 ENCOUNTER — HOSPITAL ENCOUNTER (OUTPATIENT)
Dept: GENERAL RADIOLOGY | Facility: HOSPITAL | Age: 54
Discharge: HOME | End: 2024-02-29
Admitting: PHYSICIAN ASSISTANT
Payer: COMMERCIAL

## 2024-02-29 DIAGNOSIS — M43.06 LUMBAR SPONDYLOLYSIS: Primary | ICD-10-CM

## 2024-02-29 DIAGNOSIS — M43.06 LUMBAR SPONDYLOLYSIS: ICD-10-CM

## 2024-02-29 PROCEDURE — 72114 X-RAY EXAM L-S SPINE BENDING: CPT

## 2024-05-06 RX ORDER — LIDOCAINE 50 MG/G
1 PATCH TOPICAL SEE ADMIN INSTRUCTIONS
Qty: 30 PATCH | Refills: 6 | Status: SHIPPED | OUTPATIENT
Start: 2024-05-06

## 2024-06-06 RX ORDER — TRAZODONE HYDROCHLORIDE 50 MG/1
TABLET ORAL
Qty: 90 TABLET | Refills: 1 | Status: SHIPPED | OUTPATIENT
Start: 2024-06-06

## 2024-06-09 PROCEDURE — 87070 CULTURE OTHR SPECIMN AEROBIC: CPT | Performed by: NURSE PRACTITIONER

## 2024-06-09 PROCEDURE — 87205 SMEAR GRAM STAIN: CPT | Performed by: NURSE PRACTITIONER

## 2024-06-09 PROCEDURE — 87147 CULTURE TYPE IMMUNOLOGIC: CPT | Performed by: NURSE PRACTITIONER

## 2024-06-10 ENCOUNTER — PATIENT MESSAGE (OUTPATIENT)
Dept: FAMILY MEDICINE CLINIC | Facility: CLINIC | Age: 54
End: 2024-06-10
Payer: COMMERCIAL

## 2024-06-10 DIAGNOSIS — Z00.00 WELL ADULT EXAM: Primary | ICD-10-CM

## 2024-06-10 DIAGNOSIS — Z13.220 SCREENING FOR LIPID DISORDERS: ICD-10-CM

## 2024-06-10 DIAGNOSIS — Z13.29 SCREENING FOR THYROID DISORDER: ICD-10-CM

## 2024-06-10 RX ORDER — FAMOTIDINE 40 MG/1
TABLET, FILM COATED ORAL
Qty: 90 TABLET | Refills: 4 | Status: SHIPPED | OUTPATIENT
Start: 2024-06-10

## 2024-06-10 RX ORDER — PANTOPRAZOLE SODIUM 40 MG/1
TABLET, DELAYED RELEASE ORAL
Qty: 90 TABLET | Refills: 4 | Status: SHIPPED | OUTPATIENT
Start: 2024-06-10

## 2024-06-11 PROBLEM — M76.62 ACHILLES TENDINITIS OF BOTH LOWER EXTREMITIES: Status: ACTIVE | Noted: 2024-02-19

## 2024-06-11 PROBLEM — M75.21 TENDINITIS OF LONG HEAD OF BICEPS BRACHII OF RIGHT SHOULDER: Status: ACTIVE | Noted: 2023-08-08

## 2024-06-11 PROBLEM — M76.61 ACHILLES TENDINITIS OF BOTH LOWER EXTREMITIES: Status: ACTIVE | Noted: 2024-02-19

## 2024-06-11 PROBLEM — M47.816 LUMBAR SPONDYLOSIS: Status: ACTIVE | Noted: 2024-02-19

## 2024-06-11 PROBLEM — M54.50 LOW BACK PAIN: Status: ACTIVE | Noted: 2021-08-27

## 2024-06-11 PROBLEM — M75.41 IMPINGEMENT SYNDROME OF RIGHT SHOULDER REGION: Status: ACTIVE | Noted: 2021-11-01

## 2024-06-11 PROBLEM — M77.11 LATERAL EPICONDYLITIS OF RIGHT ELBOW: Status: ACTIVE | Noted: 2023-06-13

## 2024-06-11 NOTE — TELEPHONE ENCOUNTER
From: Eugenie Rapp  To: Yudelka Hart  Sent: 6/10/2024 4:52 PM EDT  Subject: Need Labs ordered before next week’s appointment     Hi,  I have appointment on the 19th. I need labs ordered so that I can have them drawn here in [place].   Thank you!!

## 2024-06-12 ENCOUNTER — TELEPHONE (OUTPATIENT)
Dept: URGENT CARE | Facility: CLINIC | Age: 54
End: 2024-06-12
Payer: COMMERCIAL

## 2024-06-12 DIAGNOSIS — J02.0 STREPTOCOCCAL SORE THROAT: Primary | ICD-10-CM

## 2024-06-12 RX ORDER — CEPHALEXIN 500 MG/1
CAPSULE ORAL
Qty: 6 CAPSULE | Refills: 0 | Status: SHIPPED | OUTPATIENT
Start: 2024-06-12

## 2024-06-13 ENCOUNTER — LAB (OUTPATIENT)
Dept: LAB | Facility: HOSPITAL | Age: 54
End: 2024-06-13
Payer: COMMERCIAL

## 2024-06-13 PROCEDURE — 81003 URINALYSIS AUTO W/O SCOPE: CPT | Performed by: INTERNAL MEDICINE

## 2024-06-13 PROCEDURE — 80050 GENERAL HEALTH PANEL: CPT | Performed by: INTERNAL MEDICINE

## 2024-06-13 PROCEDURE — 84439 ASSAY OF FREE THYROXINE: CPT | Performed by: INTERNAL MEDICINE

## 2024-06-13 PROCEDURE — 80061 LIPID PANEL: CPT | Performed by: INTERNAL MEDICINE

## 2024-06-19 ENCOUNTER — OFFICE VISIT (OUTPATIENT)
Dept: FAMILY MEDICINE CLINIC | Facility: CLINIC | Age: 54
End: 2024-06-19
Payer: COMMERCIAL

## 2024-06-19 VITALS
TEMPERATURE: 97.7 F | WEIGHT: 208.8 LBS | OXYGEN SATURATION: 98 % | HEIGHT: 69 IN | HEART RATE: 67 BPM | DIASTOLIC BLOOD PRESSURE: 72 MMHG | SYSTOLIC BLOOD PRESSURE: 114 MMHG | BODY MASS INDEX: 30.93 KG/M2

## 2024-06-19 DIAGNOSIS — F41.9 ANXIETY: ICD-10-CM

## 2024-06-19 DIAGNOSIS — R05.9 COUGH, UNSPECIFIED TYPE: ICD-10-CM

## 2024-06-19 DIAGNOSIS — Z00.00 WELL ADULT EXAM: ICD-10-CM

## 2024-06-19 DIAGNOSIS — M79.7 FIBROMYALGIA: ICD-10-CM

## 2024-06-19 DIAGNOSIS — K21.9 GASTROESOPHAGEAL REFLUX DISEASE, UNSPECIFIED WHETHER ESOPHAGITIS PRESENT: Primary | ICD-10-CM

## 2024-06-19 PROCEDURE — 99396 PREV VISIT EST AGE 40-64: CPT | Performed by: INTERNAL MEDICINE

## 2024-06-19 RX ORDER — FLUTICASONE PROPIONATE AND SALMETEROL 250; 50 UG/1; UG/1
1 POWDER RESPIRATORY (INHALATION)
Qty: 60 EACH | Refills: 0 | Status: SHIPPED | OUTPATIENT
Start: 2024-06-19

## 2024-06-19 RX ORDER — ALBUTEROL SULFATE 90 UG/1
2 AEROSOL, METERED RESPIRATORY (INHALATION) EVERY 6 HOURS PRN
Qty: 18 G | Refills: 1 | Status: SHIPPED | OUTPATIENT
Start: 2024-06-19

## 2024-06-19 RX ORDER — BUSPIRONE HYDROCHLORIDE 15 MG/1
15 TABLET ORAL 2 TIMES DAILY
Qty: 180 TABLET | Refills: 1 | Status: SHIPPED | OUTPATIENT
Start: 2024-06-19

## 2024-06-19 RX ORDER — BUPROPION HYDROCHLORIDE 300 MG/1
300 TABLET ORAL EVERY MORNING
Qty: 90 TABLET | Refills: 4 | Status: SHIPPED | OUTPATIENT
Start: 2024-06-19

## 2024-06-19 RX ORDER — GABAPENTIN 600 MG/1
600 TABLET ORAL 3 TIMES DAILY
Qty: 270 TABLET | Refills: 1 | Status: SHIPPED | OUTPATIENT
Start: 2024-06-19

## 2024-06-19 NOTE — PROGRESS NOTES
"Chief Complaint  Annual Exam (Physical Exam )    Subjective        Eugenie Rapp presents to [facility]  History of Present Illness  Here for CPE.  She caught strep throat in [place] on June1  with very bad ST and swollen LN's.  She didn't start medication until June 9th and was started on keflex and steroids that she started a week ago today.  When talking, her throat will hurt and she is coughing up phlegm that is now yellow--it was more discolored.  She could feel congestion in chest but it is now improving.  She is using albuterol much less often now and hasn't used it in a few days.  Cough is also better although it can still produce mucous.  No longer keeping her awake at night.  Has some persistent fatigue and shortness of breath with exertion since having strep since June 1.   Also feels tight at times with breathing but these sx have improved just not resolved.  Pap smear is due and she will consider seeing gyn in [place]  8/2020 CN and repeat in 2030 12/28/22 MMG and will get an appt with gyn in [place] to schedule  She is in pain management in [place] for her FM and chronic LBP.  Has also gotten injections in achilles tendons which has helped tremendously.      Comprehensive Metabolic Panel (06/13/2024 09:13)  CBC & Differential (06/13/2024 09:13)  Lipid Panel With LDL / HDL Ratio (06/13/2024 09:13)  Urinalysis With Culture If Indicated - Urine, Clean Catch (06/13/2024 09:13)  TSH+Free T4 (06/13/2024 09:13)  [place] Urine Culture Tube - Urine, Clean Catch (06/13/2024 09:13)  Objective   Vital Signs:  /72 (BP Location: Left arm, Patient Position: Sitting, Cuff Size: Large Adult)   Pulse 67   Temp 97.7 °F (36.5 °C)   Ht 175.3 cm (69\")   Wt 94.7 kg (208 lb 12.8 oz)   SpO2 98%   BMI 30.83 kg/m²   Estimated body mass index is 30.83 kg/m² as calculated from the following:    Height as of this encounter: 175.3 cm (69\").    Weight as of this encounter: 94.7 kg (208 lb 12.8 " oz).       BMI is >= 30 and <35. (Class 1 Obesity). The following options were offered after discussion;: weight loss educational material (shared in after visit summary), exercise counseling/recommendations, and nutrition counseling/recommendations      Physical Exam  Vitals and nursing note reviewed.   Constitutional:       Appearance: Normal appearance. She is well-developed.   HENT:      Head: Normocephalic and atraumatic.      Right Ear: Tympanic membrane, ear canal and external ear normal.      Left Ear: Tympanic membrane, ear canal and external ear normal.      Mouth/Throat:      Mouth: Mucous membranes are moist.      Pharynx: No oropharyngeal exudate or posterior oropharyngeal erythema.   Eyes:      Extraocular Movements: Extraocular movements intact.      Conjunctiva/sclera: Conjunctivae normal.   Neck:      Vascular: No carotid bruit.   Cardiovascular:      Rate and Rhythm: Normal rate and regular rhythm.      Heart sounds: Normal heart sounds.      Comments: No bruits  Pulmonary:      Effort: Pulmonary effort is normal. No respiratory distress.      Breath sounds: Normal breath sounds. No stridor. No wheezing, rhonchi or rales.   Chest:      Chest wall: No tenderness.   Abdominal:      General: Bowel sounds are normal. There is no distension.      Palpations: Abdomen is soft. There is no mass.      Tenderness: There is no abdominal tenderness. There is no guarding or rebound.      Hernia: No hernia is present.   Musculoskeletal:      Cervical back: Neck supple.      Right lower leg: No edema.      Left lower leg: No edema.   Lymphadenopathy:      Cervical: No cervical adenopathy.   Skin:     General: Skin is warm.   Neurological:      General: No focal deficit present.      Mental Status: She is alert and oriented to person, place, and time. Mental status is at baseline.   Psychiatric:         Mood and Affect: Mood normal.         Behavior: Behavior normal.         Thought Content: Thought content  normal.         Judgment: Judgment normal.        Result Review :                     Assessment and Plan     Diagnoses and all orders for this visit:    1. Gastroesophageal reflux disease, unspecified whether esophagitis present (Primary)    2. Fibromyalgia  -     gabapentin (NEURONTIN) 600 MG tablet; Take 1 tablet by mouth 3 (Three) Times a Day.  Dispense: 270 tablet; Refill: 1    3. Well adult exam    4. Anxiety    5. Cough, unspecified type    Other orders  -     Fluticasone-Salmeterol (ADVAIR/WIXELA) 250-50 MCG/ACT DISKUS; Inhale 1 puff 2 (Two) Times a Day.  Dispense: 60 each; Refill: 0  -     buPROPion XL (WELLBUTRIN XL) 300 MG 24 hr tablet; Take 1 tablet by mouth Every Morning.  Dispense: 90 tablet; Refill: 4  -     busPIRone (BUSPAR) 15 MG tablet; Take 1 tablet by mouth 2 (Two) Times a Day.  Dispense: 180 tablet; Refill: 1  -     albuterol sulfate  (90 Base) MCG/ACT inhaler; Inhale 2 puffs Every 6 (Six) Hours As Needed for Wheezing.  Dispense: 18 g; Refill: 1      I have encouraged patient to get her Pap smear and her mammogram with her gynecologist and to transfer her care to [place] where she lives.  She is not due for screening colonoscopy.  It sounds like she has had a time with a strep infection that has gone into more of a bronchitis picture.  She is improving.  Her lungs are clear.  Her oxygenation is great.  Will start her on Advair 250-51 puff twice daily with rinsing mouth after each use.  Have refilled her albuterol for as needed use although she is not currently needing it.  I am hopeful that the Advair will help with her breathing and eliminate her cough.  If she does not improve she will need a chest x-ray.  Will refill her Wellbutrin  mg once daily for depression.  Anxiety is well-controlled with 50 mg twice a day of BuSpar.  For fibromyalgia, she is doing well with gabapentin which I just refilled a few weeks ago.  She also needed a refill on her gabapentin that she takes for  fibromyalgia.  Jl has been reviewed.       Follow Up     No follow-ups on file.  Patient was given instructions and counseling regarding her condition or for health maintenance advice. Please see specific information pulled into the AVS if appropriate.

## 2024-06-21 ENCOUNTER — HOSPITAL ENCOUNTER (EMERGENCY)
Facility: HOSPITAL | Age: 54
Discharge: HOME | End: 2024-06-21
Attending: EMERGENCY MEDICINE
Payer: COMMERCIAL

## 2024-06-21 ENCOUNTER — APPOINTMENT (OUTPATIENT)
Dept: CT IMAGING | Facility: HOSPITAL | Age: 54
End: 2024-06-21
Payer: COMMERCIAL

## 2024-06-21 VITALS
HEIGHT: 69 IN | TEMPERATURE: 98.3 F | SYSTOLIC BLOOD PRESSURE: 121 MMHG | RESPIRATION RATE: 24 BRPM | DIASTOLIC BLOOD PRESSURE: 73 MMHG | HEART RATE: 72 BPM | WEIGHT: 208 LBS | BODY MASS INDEX: 30.81 KG/M2 | OXYGEN SATURATION: 99 %

## 2024-06-21 DIAGNOSIS — N20.1 URETEROLITHIASIS: Primary | ICD-10-CM

## 2024-06-21 LAB
ALBUMIN SERPL-MCNC: 4.2 G/DL (ref 3.5–5.2)
ALBUMIN/GLOB SERPL: 1.6 G/DL
ALP SERPL-CCNC: 57 U/L (ref 39–117)
ALT SERPL W P-5'-P-CCNC: 11 U/L (ref 1–33)
ANION GAP SERPL CALCULATED.3IONS-SCNC: 10 MMOL/L (ref 5–15)
AST SERPL-CCNC: 19 U/L (ref 1–32)
BACTERIA UR QL AUTO: ABNORMAL /HPF
BASOPHILS # BLD AUTO: 0.08 10*3/MM3 (ref 0–0.2)
BASOPHILS NFR BLD AUTO: 0.8 % (ref 0–1.5)
BILIRUB SERPL-MCNC: 0.3 MG/DL (ref 0–1.2)
BILIRUB UR QL STRIP: NEGATIVE
BUN SERPL-MCNC: 21 MG/DL (ref 6–20)
BUN/CREAT SERPL: 18.3 (ref 7–25)
CALCIUM SPEC-SCNC: 9.3 MG/DL (ref 8.6–10.5)
CHLORIDE SERPL-SCNC: 104 MMOL/L (ref 98–107)
CLARITY UR: CLEAR
CO2 SERPL-SCNC: 23 MMOL/L (ref 22–29)
COLOR UR: YELLOW
CREAT SERPL-MCNC: 1.15 MG/DL (ref 0.57–1)
DEPRECATED RDW RBC AUTO: 39.3 FL (ref 37–54)
EGFRCR SERPLBLD CKD-EPI 2021: 56.7 ML/MIN/1.73
EOSINOPHIL # BLD AUTO: 0.13 10*3/MM3 (ref 0–0.4)
EOSINOPHIL NFR BLD AUTO: 1.2 % (ref 0.3–6.2)
ERYTHROCYTE [DISTWIDTH] IN BLOOD BY AUTOMATED COUNT: 12 % (ref 12.3–15.4)
GLOBULIN UR ELPH-MCNC: 2.7 GM/DL
GLUCOSE SERPL-MCNC: 83 MG/DL (ref 65–99)
GLUCOSE UR STRIP-MCNC: NEGATIVE MG/DL
HCT VFR BLD AUTO: 38.4 % (ref 34–46.6)
HGB BLD-MCNC: 13.2 G/DL (ref 12–15.9)
HGB UR QL STRIP.AUTO: ABNORMAL
HYALINE CASTS UR QL AUTO: ABNORMAL /LPF
IMM GRANULOCYTES # BLD AUTO: 0.07 10*3/MM3 (ref 0–0.05)
IMM GRANULOCYTES NFR BLD AUTO: 0.7 % (ref 0–0.5)
KETONES UR QL STRIP: NEGATIVE
LEUKOCYTE ESTERASE UR QL STRIP.AUTO: ABNORMAL
LIPASE SERPL-CCNC: 16 U/L (ref 13–60)
LYMPHOCYTES # BLD AUTO: 3.34 10*3/MM3 (ref 0.7–3.1)
LYMPHOCYTES NFR BLD AUTO: 31.7 % (ref 19.6–45.3)
MCH RBC QN AUTO: 30.6 PG (ref 26.6–33)
MCHC RBC AUTO-ENTMCNC: 34.4 G/DL (ref 31.5–35.7)
MCV RBC AUTO: 89.1 FL (ref 79–97)
MONOCYTES # BLD AUTO: 0.81 10*3/MM3 (ref 0.1–0.9)
MONOCYTES NFR BLD AUTO: 7.7 % (ref 5–12)
NEUTROPHILS NFR BLD AUTO: 57.9 % (ref 42.7–76)
NEUTROPHILS NFR BLD AUTO: 6.1 10*3/MM3 (ref 1.7–7)
NITRITE UR QL STRIP: NEGATIVE
NRBC BLD AUTO-RTO: 0 /100 WBC (ref 0–0.2)
PH UR STRIP.AUTO: >=9 [PH] (ref 5–8)
PLATELET # BLD AUTO: 351 10*3/MM3 (ref 140–450)
PMV BLD AUTO: 9.9 FL (ref 6–12)
POTASSIUM SERPL-SCNC: 4 MMOL/L (ref 3.5–5.2)
PROT SERPL-MCNC: 6.9 G/DL (ref 6–8.5)
PROT UR QL STRIP: ABNORMAL
RBC # BLD AUTO: 4.31 10*6/MM3 (ref 3.77–5.28)
RBC # UR STRIP: ABNORMAL /HPF
REF LAB TEST METHOD: ABNORMAL
SODIUM SERPL-SCNC: 137 MMOL/L (ref 136–145)
SP GR UR STRIP: 1.02 (ref 1–1.03)
SQUAMOUS #/AREA URNS HPF: ABNORMAL /HPF
TRANS CELLS #/AREA URNS HPF: ABNORMAL /HPF
UROBILINOGEN UR QL STRIP: ABNORMAL
WBC # UR STRIP: ABNORMAL /HPF
WBC NRBC COR # BLD AUTO: 10.53 10*3/MM3 (ref 3.4–10.8)

## 2024-06-21 PROCEDURE — 85025 COMPLETE CBC W/AUTO DIFF WBC: CPT | Performed by: EMERGENCY MEDICINE

## 2024-06-21 PROCEDURE — 96375 TX/PRO/DX INJ NEW DRUG ADDON: CPT

## 2024-06-21 PROCEDURE — 99284 EMERGENCY DEPT VISIT MOD MDM: CPT

## 2024-06-21 PROCEDURE — 25010000002 KETOROLAC TROMETHAMINE PER 15 MG: Performed by: EMERGENCY MEDICINE

## 2024-06-21 PROCEDURE — 96374 THER/PROPH/DIAG INJ IV PUSH: CPT

## 2024-06-21 PROCEDURE — 25010000002 HYDROMORPHONE PER 4 MG: Performed by: EMERGENCY MEDICINE

## 2024-06-21 PROCEDURE — 83690 ASSAY OF LIPASE: CPT | Performed by: EMERGENCY MEDICINE

## 2024-06-21 PROCEDURE — 96376 TX/PRO/DX INJ SAME DRUG ADON: CPT

## 2024-06-21 PROCEDURE — 25010000002 ONDANSETRON PER 1 MG: Performed by: EMERGENCY MEDICINE

## 2024-06-21 PROCEDURE — 80053 COMPREHEN METABOLIC PANEL: CPT | Performed by: EMERGENCY MEDICINE

## 2024-06-21 PROCEDURE — 74176 CT ABD & PELVIS W/O CONTRAST: CPT

## 2024-06-21 PROCEDURE — 25810000003 SODIUM CHLORIDE 0.9 % SOLUTION: Performed by: EMERGENCY MEDICINE

## 2024-06-21 PROCEDURE — 87086 URINE CULTURE/COLONY COUNT: CPT | Performed by: EMERGENCY MEDICINE

## 2024-06-21 PROCEDURE — 81001 URINALYSIS AUTO W/SCOPE: CPT | Performed by: EMERGENCY MEDICINE

## 2024-06-21 RX ORDER — TAMSULOSIN HYDROCHLORIDE 0.4 MG/1
1 CAPSULE ORAL DAILY
Qty: 10 CAPSULE | Refills: 0 | Status: SHIPPED | OUTPATIENT
Start: 2024-06-21

## 2024-06-21 RX ORDER — ONDANSETRON 2 MG/ML
4 INJECTION INTRAMUSCULAR; INTRAVENOUS ONCE
Status: COMPLETED | OUTPATIENT
Start: 2024-06-21 | End: 2024-06-21

## 2024-06-21 RX ORDER — HYDROCODONE BITARTRATE AND ACETAMINOPHEN 5; 325 MG/1; MG/1
1 TABLET ORAL EVERY 6 HOURS PRN
Qty: 12 TABLET | Refills: 0 | Status: SHIPPED | OUTPATIENT
Start: 2024-06-21

## 2024-06-21 RX ORDER — KETOROLAC TROMETHAMINE 15 MG/ML
15 INJECTION, SOLUTION INTRAMUSCULAR; INTRAVENOUS ONCE
Status: COMPLETED | OUTPATIENT
Start: 2024-06-21 | End: 2024-06-21

## 2024-06-21 RX ORDER — HYDROMORPHONE HYDROCHLORIDE 1 MG/ML
0.25 INJECTION, SOLUTION INTRAMUSCULAR; INTRAVENOUS; SUBCUTANEOUS ONCE
Status: COMPLETED | OUTPATIENT
Start: 2024-06-21 | End: 2024-06-21

## 2024-06-21 RX ORDER — HYDROCODONE BITARTRATE AND ACETAMINOPHEN 5; 325 MG/1; MG/1
1 TABLET ORAL ONCE
Status: COMPLETED | OUTPATIENT
Start: 2024-06-21 | End: 2024-06-21

## 2024-06-21 RX ORDER — ONDANSETRON 4 MG/1
4 TABLET, FILM COATED ORAL EVERY 8 HOURS PRN
Qty: 15 TABLET | Refills: 0 | Status: SHIPPED | OUTPATIENT
Start: 2024-06-21

## 2024-06-21 RX ORDER — SODIUM CHLORIDE 0.9 % (FLUSH) 0.9 %
10 SYRINGE (ML) INJECTION AS NEEDED
Status: DISCONTINUED | OUTPATIENT
Start: 2024-06-21 | End: 2024-06-22 | Stop reason: HOSPADM

## 2024-06-21 RX ADMIN — SODIUM CHLORIDE 1000 ML: 900 INJECTION, SOLUTION INTRAVENOUS at 22:07

## 2024-06-21 RX ADMIN — KETOROLAC TROMETHAMINE 15 MG: 15 INJECTION, SOLUTION INTRAMUSCULAR; INTRAVENOUS at 22:06

## 2024-06-21 RX ADMIN — HYDROCODONE BITARTRATE AND ACETAMINOPHEN 1 TABLET: 5; 325 TABLET ORAL at 23:53

## 2024-06-21 RX ADMIN — ONDANSETRON 4 MG: 2 INJECTION INTRAMUSCULAR; INTRAVENOUS at 22:06

## 2024-06-21 RX ADMIN — HYDROMORPHONE HYDROCHLORIDE 0.25 MG: 1 INJECTION, SOLUTION INTRAMUSCULAR; INTRAVENOUS; SUBCUTANEOUS at 22:06

## 2024-06-21 RX ADMIN — ONDANSETRON 4 MG: 2 INJECTION INTRAMUSCULAR; INTRAVENOUS at 23:17

## 2024-06-22 NOTE — ED PROVIDER NOTES
Subjective   History of Present Illness  53-year-old female presents for evaluation of left flank pain.  She tells me that tonight at around 7:30 PM she began experiencing left-sided flank pain that has persisted since that time.  She currently rates her pain 8 out of 10 in severity.  She denies any accompanying vomiting but does endorse accompanying nausea.  No fevers.  No rash.  She denies any prior history of kidney stones.  Given her ongoing pain she came here for evaluation.  The pain seems to be waxing and waning in severity and is coming in waves.      Review of Systems   Gastrointestinal:  Positive for nausea.   Genitourinary:  Positive for flank pain.   All other systems reviewed and are negative.      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    Partial blindness     LEFT EYE <1/4 VISUAL FIELD AFFECTED FROM MICROSTROKE TO RETINA    Plantar fasciitis     BILAT FEET    PMS (premenstrual syndrome)     PONV (postoperative nausea and vomiting)     VOMITS ALL DAY    Retinal thrombosis     Sciatica of left side     Seasonal allergies     Stroke 15 years ago    Micro stroke left retina    Tennis elbow     LEFT    Urinary tract infection ~10 years ago    One time    Varicella Childhood    Vitamin D deficiency        Allergies   Allergen Reactions    Promethazine Other (See Comments)     EPS SYMPTOMS    Codeine Rash    Penicillins Rash    Sulfa Antibiotics Rash       Past Surgical History:   Procedure Laterality Date    CHOLECYSTECTOMY      COLONOSCOPY      KNEE CARTILAGE SURGERY      LAPAROSCOPIC CHOLECYSTECTOMY  ~ 13 years    LASIK       POSTERIOR VAGINAL REPAIR      TENNIS ELBOW RELEASE Left 10/03/2017    Procedure: LEFT ELBOW OPEN EXTENSOR TENDON DEBRIDEMENT AND REPAIR;  Surgeon: TASH Aly MD;  Location: [facility];  Service:     ULNAR NERVE DECOMPRESSION Left     WISDOM TOOTH EXTRACTION         Family History   Problem Relation Age of Onset    Leukemia Brother     Alcohol abuse Brother     Cancer Brother         Leukemia    Hypertension Mother     Stroke Father     Hypertension Father     Vision loss Brother         Genetic    Malig Hyperthermia Neg Hx     Breast cancer Neg Hx     Ovarian cancer Neg Hx     Colon cancer Neg Hx     Deep vein thrombosis Neg Hx     Pulmonary embolism Neg Hx        Social History     Socioeconomic History    Marital status:    Tobacco Use    Smoking status: Never     Passive exposure: Never    Smokeless tobacco: Never   Vaping Use    Vaping status: Never Used   Substance and Sexual Activity    Alcohol use: No     Comment: RARE MONTHLY    Drug use: Never    Sexual activity: Yes     Partners: Male     Birth control/protection: Post-menopausal, Surgical     Comment: vas           Objective   Physical Exam  Vitals and nursing note reviewed.   Constitutional:       Appearance: She is well-developed. She is not diaphoretic.      Comments: Uncomfortable appearing female   HENT:      Head: Normocephalic and atraumatic.   Eyes:      Pupils: Pupils are equal, round, and reactive to light.   Cardiovascular:      Rate and Rhythm: Normal rate and regular rhythm.      Heart sounds: Normal heart sounds. No murmur heard.     No friction rub. No gallop.   Pulmonary:      Effort: Pulmonary effort is normal. No respiratory distress.      Breath sounds: Normal breath sounds. No wheezing or rales.   Abdominal:      General: Bowel sounds are normal. There is no distension.      Palpations: Abdomen is soft. There is no mass.      Tenderness: There is no abdominal tenderness. There is no guarding or rebound.      Comments: No  focal abdominal tenderness, no peritoneal signs, no pain out of proportion to exam   Genitourinary:     Comments: No CVA tenderness noted  Musculoskeletal:         General: Normal range of motion.      Cervical back: Neck supple.   Skin:     General: Skin is warm and dry.      Findings: No erythema or rash.      Comments: No dermatomal rash present   Neurological:      Mental Status: She is alert and oriented to person, place, and time.   Psychiatric:         Mood and Affect: Mood normal.         Thought Content: Thought content normal.         Judgment: Judgment normal.         Procedures           ED Course  ED Course as of 06/21/24 2354 Fri Jun 21, 2024 2136 53-year-old female presents for evaluation of left flank pain.  She tells me that tonight at around 7:30 PM she began experiencing left-sided flank pain that has persisted since that time.  She currently rates her pain 8 out of 10 in severity.  She denies any accompanying vomiting.  No fevers.  No prior history of kidney stones.  Given her ongoing symptoms she came here for evaluation.  On arrival, the patient is uncomfortable appearing.  Nonsurgical abdomen.  No CVA tenderness noted.  No dermatomal rash present.  Broad differential diagnosis.  We will obtain labs and imaging, and we will reassess following initial interventions. [DD]   2209 I personally and independently reviewed the patient's CT images and findings, and I am in agreement with the radiologist regarding CT interpretation--particularly regarding a 5 mm stone at left UVJ with mild accompanying hydronephrosis present. [DD]   2251 Labs are bland/unrevealing. [DD]   2336 Upon reevaluation, the patient looks and feels much improved.  Pain is adequately controlled.  I feel that the patient can be safely managed on an outpatient basis at this point.  Prescription for Norco, Zofran, and Flomax.  I have referred the patient to Dr. Montes of urology and she will follow-up as needed if she fails  conservative measures.  We will discharge her home with a urine strainer.  Agreeable with plan and given appropriate strict return precautions. [DD]      ED Course User Index  [DD] Gonsalo Medellin MD                                 Recent Results (from the past 24 hour(s))   Comprehensive Metabolic Panel    Collection Time: 06/21/24 10:05 PM    Specimen: Blood   Result Value Ref Range    Glucose 83 65 - 99 mg/dL    BUN 21 (H) 6 - 20 mg/dL    Creatinine 1.15 (H) 0.57 - 1.00 mg/dL    Sodium 137 136 - 145 mmol/L    Potassium 4.0 3.5 - 5.2 mmol/L    Chloride 104 98 - 107 mmol/L    CO2 23.0 22.0 - 29.0 mmol/L    Calcium 9.3 8.6 - 10.5 mg/dL    Total Protein 6.9 6.0 - 8.5 g/dL    Albumin 4.2 3.5 - 5.2 g/dL    ALT (SGPT) 11 1 - 33 U/L    AST (SGOT) 19 1 - 32 U/L    Alkaline Phosphatase 57 39 - 117 U/L    Total Bilirubin 0.3 0.0 - 1.2 mg/dL    Globulin 2.7 gm/dL    A/G Ratio 1.6 g/dL    BUN/Creatinine Ratio 18.3 7.0 - 25.0    Anion Gap 10.0 5.0 - 15.0 mmol/L    eGFR 56.7 (L) >60.0 mL/min/1.73   Lipase    Collection Time: 06/21/24 10:05 PM    Specimen: Blood   Result Value Ref Range    Lipase 16 13 - 60 U/L   CBC Auto Differential    Collection Time: 06/21/24 10:05 PM    Specimen: Blood   Result Value Ref Range    WBC 10.53 3.40 - 10.80 10*3/mm3    RBC 4.31 3.77 - 5.28 10*6/mm3    Hemoglobin 13.2 12.0 - 15.9 g/dL    Hematocrit 38.4 34.0 - 46.6 %    MCV 89.1 79.0 - 97.0 fL    MCH 30.6 26.6 - 33.0 pg    MCHC 34.4 31.5 - 35.7 g/dL    RDW 12.0 (L) 12.3 - 15.4 %    RDW-SD 39.3 37.0 - 54.0 fl    MPV 9.9 6.0 - 12.0 fL    Platelets 351 140 - 450 10*3/mm3    Neutrophil % 57.9 42.7 - 76.0 %    Lymphocyte % 31.7 19.6 - 45.3 %    Monocyte % 7.7 5.0 - 12.0 %    Eosinophil % 1.2 0.3 - 6.2 %    Basophil % 0.8 0.0 - 1.5 %    Immature Grans % 0.7 (H) 0.0 - 0.5 %    Neutrophils, Absolute 6.10 1.70 - 7.00 10*3/mm3    Lymphocytes, Absolute 3.34 (H) 0.70 - 3.10 10*3/mm3    Monocytes, Absolute 0.81 0.10 - 0.90 10*3/mm3    Eosinophils,  Absolute 0.13 0.00 - 0.40 10*3/mm3    Basophils, Absolute 0.08 0.00 - 0.20 10*3/mm3    Immature Grans, Absolute 0.07 (H) 0.00 - 0.05 10*3/mm3    nRBC 0.0 0.0 - 0.2 /100 WBC   Urinalysis With Culture If Indicated - Urine, Clean Catch    Collection Time: 06/21/24 11:05 PM    Specimen: Urine, Clean Catch   Result Value Ref Range    Color, UA Yellow Yellow, Straw    Appearance, UA Clear Clear    pH, UA >=9.0 (H) 5.0 - 8.0    Specific Gravity, UA 1.024 1.001 - 1.030    Glucose, UA Negative Negative    Ketones, UA Negative Negative    Bilirubin, UA Negative Negative    Blood, UA Moderate (2+) (A) Negative    Protein, UA 30 mg/dL (1+) (A) Negative    Leuk Esterase, UA Small (1+) (A) Negative    Nitrite, UA Negative Negative    Urobilinogen, UA 1.0 E.U./dL 0.2 - 1.0 E.U./dL   Urinalysis, Microscopic Only - Urine, Clean Catch    Collection Time: 06/21/24 11:05 PM    Specimen: Urine, Clean Catch   Result Value Ref Range    RBC, UA 11-20 (A) None Seen, 0-2 /HPF    WBC, UA 11-20 (A) None Seen, 0-2 /HPF    Bacteria, UA Trace None Seen, Trace /HPF    Squamous Epithelial Cells, UA 3-6 (A) None Seen, 0-2 /HPF    Transitional Epithelial Cells, UA 0-2 0 - 2 /HPF    Hyaline Casts, UA 7-12 0 - 6 /LPF    Methodology Manual Light Microscopy      Note: In addition to lab results from this visit, the labs listed above may include labs taken at another facility or during a different encounter within the last 24 hours. Please correlate lab times with ED admission and discharge times for further clarification of the services performed during this visit.    CT Abdomen Pelvis Without Contrast   Final Result   Impression:   0.5 cm obstructing stone within the left ureterovesicular junction causing mild left-sided hydronephrosis and hydroureter.      Additional 0.3 cm nonobstructing stone in the left mid kidney.      No obstructing stones or hydronephrosis on the right                  Electronically Signed: Mak Spear DO     6/21/2024  "10:04 PM EDT     Workstation ID: [id number]        Vitals:    06/21/24 2118 06/21/24 2127 06/21/24 2202 06/21/24 2232   BP: (!) 158/102 128/78 136/85 119/72   BP Location: Left arm      Patient Position: Sitting      Pulse: 75  74 70   Resp: 24      Temp: 98.3 °F (36.8 °C)      TempSrc: Oral      SpO2: 96%  98% 99%   Weight: 94.3 kg (208 lb)      Height: 175.3 cm (69\")        Medications   sodium chloride 0.9 % flush 10 mL (has no administration in time range)   sodium chloride 0.9 % bolus 1,000 mL (0 mL Intravenous Stopped 6/21/24 2304)   ketorolac (TORADOL) injection 15 mg (15 mg Intravenous Given 6/21/24 2206)   HYDROmorphone (DILAUDID) injection 0.25 mg (0.25 mg Intravenous Given 6/21/24 2206)   ondansetron (ZOFRAN) injection 4 mg (4 mg Intravenous Given 6/21/24 2206)   ondansetron (ZOFRAN) injection 4 mg (4 mg Intravenous Given 6/21/24 2317)   HYDROcodone-acetaminophen (NORCO) 5-325 MG per tablet 1 tablet (1 tablet Oral Given 6/21/24 2353)     ECG/EMG Results (last 24 hours)       ** No results found for the last 24 hours. **          No orders to display                   Medical Decision Making  Problems Addressed:  Ureterolithiasis: complicated acute illness or injury    Amount and/or Complexity of Data Reviewed  Labs: ordered.  Radiology: ordered.    Risk  Prescription drug management.        Final diagnoses:   Ureterolithiasis       ED Disposition  ED Disposition       ED Disposition   Discharge    Condition   Stable    Comment   --               Familia Montes MD  [address] [id number] [phone]    In 1 week  As needed         Medication List        New Prescriptions      HYDROcodone-acetaminophen 5-325 MG per tablet  Commonly known as: NORCO  Take 1 tablet by mouth Every 6 (Six) Hours As Needed for Moderate Pain.     ondansetron 4 MG tablet  Commonly known as: ZOFRAN  Take 1 tablet by mouth Every 8 (Eight) Hours As Needed for Nausea.     tamsulosin 0.4 MG capsule 24 hr " capsule  Commonly known as: FLOMAX  Take 1 capsule by mouth Daily.               Where to Get Your Medications        These medications were sent to [facility] [id number] - [place], [place] - [address] - [phone]  - [phone]   [address] [address]      Phone: [phone]   HYDROcodone-acetaminophen 5-325 MG per tablet  ondansetron 4 MG tablet  tamsulosin 0.4 MG capsule 24 hr capsule            Gonsalo Medellin MD  06/21/24 9064

## 2024-06-23 LAB — BACTERIA SPEC AEROBE CULT: NO GROWTH

## 2024-06-25 ENCOUNTER — OFFICE VISIT (OUTPATIENT)
Dept: UROLOGY | Facility: CLINIC | Age: 54
End: 2024-06-25
Payer: COMMERCIAL

## 2024-06-25 DIAGNOSIS — N20.0 NEPHROLITHIASIS: ICD-10-CM

## 2024-06-25 DIAGNOSIS — N20.1 LEFT URETERAL CALCULUS: Primary | ICD-10-CM

## 2024-06-25 NOTE — PROGRESS NOTES
Office Note Kidney Stone      Patient Name: Eugenie Rapp  : 1970   MRN: [id number]     Chief Complaint: History of Nephrolithiasis/Ureterolithiasis       History of Present Illness: Eugenie Rapp is a 53 y.o. female who presents today for history of nephrolithiasis.     She reports developing LLQ abdominal pain that began on  with associated gross hematuria. She presented to [facility] ED on  for worsening pain. She underwent CT AP stone protocol and was found to have 5mm Left UVJ calculus with mild left hydronephrosis. She was also found to have 3mm nonobstructing left renal calculus. She was discharged home with pain medication, flomax and urine strainer. She reports seeing 2 small stones in the urine strainer. Since seeing the stones, she denies any further abdominal pain or hematuria. She is feeling well today.     She denies any prior history of nephrolithiasis.   Subjective      Review of System: Review of Systems   Genitourinary:  Negative for decreased urine volume, difficulty urinating, dysuria, enuresis, flank pain, frequency, hematuria and urgency.        I have reviewed the ROS documented by my clinical staff, updated as appropriate and I agree. 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    Partial blindness     LEFT EYE <1/4 VISUAL FIELD AFFECTED FROM MICROSTROKE TO RETINA    Plantar fasciitis     BILAT FEET    PMS (premenstrual syndrome)     PONV (postoperative nausea and vomiting)     VOMITS ALL DAY    Retinal thrombosis      Sciatica of left side     Seasonal allergies     Stroke 15 years ago    Micro stroke left retina    Tennis elbow     LEFT    Urinary tract infection ~10 years ago    One time    Varicella Childhood    Vitamin D deficiency        Past Surgical History:   Past Surgical History:   Procedure Laterality Date    CHOLECYSTECTOMY      COLONOSCOPY      KNEE CARTILAGE SURGERY      LAPAROSCOPIC CHOLECYSTECTOMY  ~ 13 years    LASIK      POSTERIOR VAGINAL REPAIR      TENNIS ELBOW RELEASE Left 10/03/2017    Procedure: LEFT ELBOW OPEN EXTENSOR TENDON DEBRIDEMENT AND REPAIR;  Surgeon: TASH Aly MD;  [place] [facility] [place] [facility] [place]  Service:     ULNAR NERVE DECOMPRESSION Left     WISDOM TOOTH EXTRACTION         Family History:   Family History   Problem Relation Age of Onset    Leukemia Brother     Alcohol abuse Brother     Cancer Brother         Leukemia    Hypertension Mother     Stroke Father     Hypertension Father     Vision loss Brother         Genetic    Malig Hyperthermia Neg Hx     Breast cancer Neg Hx     Ovarian cancer Neg Hx     Colon cancer Neg Hx     Deep vein thrombosis Neg Hx     Pulmonary embolism Neg Hx        Social History:   Social History     Socioeconomic History    Marital status:    Tobacco Use    Smoking status: Never     Passive exposure: Never    Smokeless tobacco: Never   Vaping Use    Vaping status: Never Used   Substance and Sexual Activity    Alcohol use: No     Comment: RARE MONTHLY    Drug use: Never    Sexual activity: Yes     Partners: Male     Birth control/protection: Post-menopausal, Surgical     Comment: vas       Medications:     Current Outpatient Medications:     albuterol sulfate  (90 Base) MCG/ACT inhaler, Inhale 2 puffs Every 6 (Six) Hours As Needed for Wheezing., Disp: 18 g, Rfl: 1    BLACK COHOSH PO, Take  by mouth., Disp: , Rfl:     buPROPion XL (WELLBUTRIN XL) 300 MG 24 hr tablet, Take 1 tablet by mouth Every Morning., Disp: 90 tablet, Rfl: 4    busPIRone (BUSPAR) 15 MG  tablet, Take 1 tablet by mouth 2 (Two) Times a Day., Disp: 180 tablet, Rfl: 1    CAFFEINE PO, Take  by mouth., Disp: , Rfl:     cetirizine (ZyrTEC) 10 MG tablet, Take 1 tablet by mouth Every Night., Disp: , Rfl:     Cholecalciferol (VITAMIN D-3 PO), Take 4,000 Units by mouth Daily., Disp: , Rfl:     famotidine (PEPCID) 40 MG tablet, TAKE ONE TABLET BY MOUTH EVERY NIGHT AT BEDTIME, Disp: 90 tablet, Rfl: 4    flunisolide (NASALIDE) 25 MCG/ACT (0.025%) solution nasal spray, Inhale 1 spray Every 12 (Twelve) Hours., Disp: 25 mL, Rfl: 0    Fluticasone-Salmeterol (ADVAIR/WIXELA) 250-50 MCG/ACT DISKUS, Inhale 1 puff 2 (Two) Times a Day., Disp: 60 each, Rfl: 0    gabapentin (NEURONTIN) 600 MG tablet, Take 1 tablet by mouth 3 (Three) Times a Day., Disp: 270 tablet, Rfl: 1    glucosamine-chondroitin 500-400 MG capsule capsule, Take  by mouth 3 (Three) Times a Day With Meals., Disp: , Rfl:     lidocaine (LIDODERM) 5 %, APPLY 1 PATCH TO AFFECTED AREA FOR 12 HOURS IN A 24 HOUR PERIOD, Disp: 30 patch, Rfl: 6    melatonin 5 MG tablet tablet, melatonin 5 mg tablet  Take by oral route., Disp: , Rfl:     meloxicam (MOBIC) 15 MG tablet, Take 1 tablet by mouth Daily As Needed for Moderate Pain., Disp: 90 tablet, Rfl: 1    pantoprazole (PROTONIX) 40 MG EC tablet, TAKE ONE TABLET BY MOUTH DAILY, Disp: 90 tablet, Rfl: 4    traMADol (ULTRAM) 50 MG tablet, Every 12 (Twelve) Hours., Disp: , Rfl:     traZODone (DESYREL) 50 MG tablet, TAKE ONE TABLET BY MOUTH ONCE NIGHTLY, Disp: 90 tablet, Rfl: 1    HYDROcodone-acetaminophen (NORCO) 5-325 MG per tablet, Take 1 tablet by mouth Every 6 (Six) Hours As Needed for Moderate Pain., Disp: 12 tablet, Rfl: 0    ondansetron (ZOFRAN) 4 MG tablet, Take 1 tablet by mouth Every 8 (Eight) Hours As Needed for Nausea. (Patient not taking: Reported on 6/25/2024), Disp: 15 tablet, Rfl: 0    tamsulosin (FLOMAX) 0.4 MG capsule 24 hr capsule, Take 1 capsule by mouth Daily. (Patient not taking: Reported on 6/25/2024),  Disp: 10 capsule, Rfl: 0    Allergies:   Allergies   Allergen Reactions    Promethazine Other (See Comments)     EPS SYMPTOMS    Codeine Rash    Penicillins Rash    Sulfa Antibiotics Rash       Objective     Physical Exam:   Vital Signs: There were no vitals filed for this visit.  There is no height or weight on file to calculate BMI.     Physical Exam  Vitals and nursing note reviewed.   Constitutional:       Appearance: Normal appearance.   HENT:      Head: Normocephalic and atraumatic.      Nose: Nose normal.      Mouth/Throat:      Mouth: Mucous membranes are moist.   Eyes:      Pupils: Pupils are equal, round, and reactive to light.   Pulmonary:      Effort: Pulmonary effort is normal.   Abdominal:      General: Abdomen is flat.      Palpations: Abdomen is soft.   Musculoskeletal:         General: Normal range of motion.      Cervical back: Normal range of motion.   Skin:     General: Skin is warm and dry.      Capillary Refill: Capillary refill takes less than 2 seconds.   Neurological:      General: No focal deficit present.      Mental Status: She is alert.   Psychiatric:         Mood and Affect: Mood normal.       Labs:   Brief Urine Lab Results  (Last result in the past 365 days)        Color   Clarity   Blood   Leuk Est   Nitrite   Protein   CREAT   Urine HCG        06/21/24 2305 Yellow   Clear   Moderate (2+)   Small (1+)   Negative   30 mg/dL (1+)                   Urine Culture          6/21/2024    23:05   Urine Culture   Urine Culture No growth         Lab Results   Component Value Date    GLUCOSE 83 06/21/2024    CALCIUM 9.3 06/21/2024     06/21/2024    K 4.0 06/21/2024    CO2 23.0 06/21/2024     06/21/2024    BUN 21 (H) 06/21/2024    CREATININE 1.15 (H) 06/21/2024    EGFRIFAFRI 73 03/04/2021    EGFRIFNONA 60 (L) 03/04/2021    BCR 18.3 06/21/2024    ANIONGAP 10.0 06/21/2024       Lab Results   Component Value Date    WBC 10.53 06/21/2024    HGB 13.2 06/21/2024    HCT 38.4 06/21/2024     MCV 89.1 06/21/2024     06/21/2024         Images:   CT Abdomen Pelvis Without Contrast    Result Date: 6/21/2024  Impression: 0.5 cm obstructing stone within the left ureterovesicular junction causing mild left-sided hydronephrosis and hydroureter. Additional 0.3 cm nonobstructing stone in the left mid kidney. No obstructing stones or hydronephrosis on the right Electronically Signed: Mak Spear,   6/21/2024 10:04 PM EDT  Workstation ID: [id number]    XR Spine Lumbar Complete With Flex & Ext    Result Date: 3/2/2024  Impression: 4 non rib-bearing lumbar-type vertebral bodies. The most inferior lumbar-type vertebral body is labeled L4 on this exam. There is minimal anterolisthesis L3 on L4 without findings of instability with flexion or extension. There is mild to moderate facet degenerative change L4-S1. Electronically Signed: Trisha Lozano MD  3/2/2024 11:15 AM EST  Workstation ID: [id number]      Measures:   Tobacco:   Eugenie Rapp  reports that she has never smoked. She has never been exposed to tobacco smoke. She has never used smokeless tobacco.          Urine Incontinence: Patient reports that she is not currently experiencing any symptoms of urinary incontinence.      Assessment / Plan      Assessment/Plan:   Eugneie Rapp is a 53 y.o. female who presented today for findings of 5mm left UVJ calculus and left hydronephrosis.  Patient reports [place] passage of stone after ED presentation.  She is currently asymptomatic.  Additionally, she has small nonobstructing renal stone.  We discussed the indication for repeat cross-sectional imaging to ensure passage of stone, resolution of hydronephrosis, further discussion regarding conservative strategies to reduce stone risk.  We will have her follow-up with repeat imaging, further discussion pending imaging findings including potential continued presence of nonobstructing stone..    Diagnoses and all orders for this visit:    1. Left ureteral calculus  (Primary)  -     CT Abdomen Pelvis Stone Protocol; Future    2. Nephrolithiasis           Follow Up:   Return for 1-2 weeks CT AP stone protocol prior .    I spent approximately 45 minutes providing clinical care for this patient; including review of patient's chart and provider documentation, face to face time spent with patient in examination room (obtaining history, performing physical exam, discussing diagnosis and management options), placing orders, and completing patient documentation.     Familia Montes MD  [facility] [place]

## 2024-06-26 PROBLEM — N20.1 LEFT URETERAL CALCULUS: Status: ACTIVE | Noted: 2024-06-26

## 2024-06-26 PROBLEM — N20.0 NEPHROLITHIASIS: Status: ACTIVE | Noted: 2024-06-26

## 2024-06-26 RX ORDER — MELOXICAM 15 MG/1
15 TABLET ORAL DAILY PRN
Qty: 90 TABLET | Refills: 1 | Status: SHIPPED | OUTPATIENT
Start: 2024-06-26

## 2024-06-26 NOTE — TELEPHONE ENCOUNTER
Rx Refill Note  Requested Prescriptions     Pending Prescriptions Disp Refills    meloxicam (MOBIC) 15 MG tablet [Pharmacy Med Name: MELOXICAM 15 MG TABLET] 90 tablet 1     Sig: TAKE 1 TABLET BY MOUTH DAILY AS NEEDED FOR MODERATE PAIN      Last office visit with prescribing clinician: 6/19/2024   Last telemedicine visit with prescribing clinician: Visit date not found   Next office visit with prescribing clinician: 6/24/2025                         Would you like a call back once the refill request has been completed: [] Yes [] No    If the office needs to give you a call back, can they leave a voicemail: [] Yes [] No    Javan Winslow MA  06/26/24, 08:51 EDT

## 2024-07-06 ENCOUNTER — HOSPITAL ENCOUNTER (OUTPATIENT)
Dept: CT IMAGING | Facility: HOSPITAL | Age: 54
Discharge: HOME | End: 2024-07-06
Admitting: NURSE PRACTITIONER
Payer: COMMERCIAL

## 2024-07-06 DIAGNOSIS — N20.1 LEFT URETERAL CALCULUS: ICD-10-CM

## 2024-07-06 PROCEDURE — 74176 CT ABD & PELVIS W/O CONTRAST: CPT

## 2024-07-12 ENCOUNTER — OFFICE VISIT (OUTPATIENT)
Dept: UROLOGY | Facility: CLINIC | Age: 54
End: 2024-07-12
Payer: COMMERCIAL

## 2024-07-12 DIAGNOSIS — N20.0 RENAL STONE: Primary | ICD-10-CM

## 2024-07-12 DIAGNOSIS — N20.0 NEPHROLITHIASIS: ICD-10-CM

## 2024-07-12 DIAGNOSIS — N20.1 LEFT URETERAL CALCULUS: ICD-10-CM

## 2024-07-12 RX ORDER — SCOLOPAMINE TRANSDERMAL SYSTEM 1 MG/1
1 PATCH, EXTENDED RELEASE TRANSDERMAL CONTINUOUS
Status: CANCELLED | OUTPATIENT
Start: 2024-07-12 | End: 2024-07-15

## 2024-07-12 RX ORDER — ACETAMINOPHEN 500 MG
1000 TABLET ORAL ONCE
Status: CANCELLED | OUTPATIENT
Start: 2024-07-12 | End: 2024-07-12

## 2024-07-12 RX ORDER — GABAPENTIN 100 MG/1
600 CAPSULE ORAL ONCE
Status: CANCELLED | OUTPATIENT
Start: 2024-07-12 | End: 2024-07-12

## 2024-07-12 NOTE — H&P (VIEW-ONLY)
Office Note Kidney Stone      Patient Name: Eugenie Rapp  : 1970   MRN: [id number]     Chief Complaint: History of Nephrolithiasis/Ureterolithiasis       History of Present Illness: Eugenie Rapp is a 53 y.o. female who presents today for continued evaluation secondary to history of nephrolithiasis, ureterolithiasis.  She has undergone repeat cross-sectional imaging to evaluate, confirm passage of ureteral stone.  She denies current flank pain, lower urinary tract symptoms.        Subjective      Review of System: Review of Systems   Genitourinary:  Negative for decreased urine volume, difficulty urinating, dysuria, enuresis, flank pain, frequency, hematuria and urgency.        I have reviewed the ROS documented by my clinical staff, updated as appropriate and I agree. 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    Partial blindness     LEFT EYE <1/4 VISUAL FIELD AFFECTED FROM MICROSTROKE TO RETINA    Plantar fasciitis     BILAT FEET    PMS (premenstrual syndrome)     PONV (postoperative nausea and vomiting)     VOMITS ALL DAY    Retinal thrombosis     Sciatica of left side     Seasonal allergies     Stroke 15 years ago    Micro stroke left retina    Tennis elbow     LEFT    Urinary tract infection ~10 years ago    One time    Varicella Childhood    Vitamin D deficiency        Past Surgical History:   Past Surgical History:   Procedure Laterality Date    CHOLECYSTECTOMY      COLONOSCOPY      KNEE CARTILAGE SURGERY      LAPAROSCOPIC CHOLECYSTECTOMY   ~ 13 years    LASIK      POSTERIOR VAGINAL REPAIR      TENNIS ELBOW RELEASE Left 10/03/2017    Procedure: LEFT ELBOW OPEN EXTENSOR TENDON DEBRIDEMENT AND REPAIR;  Surgeon: TASH Aly MD;  Location: [facility] OR [facility] [place];  Service:     ULNAR NERVE DECOMPRESSION Left     WISDOM TOOTH EXTRACTION         Family History:   Family History   Problem Relation Age of Onset    Leukemia Brother     Alcohol abuse Brother     Cancer Brother         Leukemia    Hypertension Mother     Stroke Father     Hypertension Father     Vision loss Brother         Genetic    Malig Hyperthermia Neg Hx     Breast cancer Neg Hx     Ovarian cancer Neg Hx     Colon cancer Neg Hx     Deep vein thrombosis Neg Hx     Pulmonary embolism Neg Hx        Social History:   Social History     Socioeconomic History    Marital status:    Tobacco Use    Smoking status: Never     Passive exposure: Never    Smokeless tobacco: Never   Vaping Use    Vaping status: Never Used   Substance and Sexual Activity    Alcohol use: No     Comment: RARE MONTHLY    Drug use: Never    Sexual activity: Yes     Partners: Male     Birth control/protection: Post-menopausal, Surgical     Comment: vas       Medications:     Current Outpatient Medications:     albuterol sulfate  (90 Base) MCG/ACT inhaler, Inhale 2 puffs Every 6 (Six) Hours As Needed for Wheezing., Disp: 18 g, Rfl: 1    BLACK COHOSH PO, Take  by mouth., Disp: , Rfl:     buPROPion XL (WELLBUTRIN XL) 300 MG 24 hr tablet, Take 1 tablet by mouth Every Morning., Disp: 90 tablet, Rfl: 4    busPIRone (BUSPAR) 15 MG tablet, Take 1 tablet by mouth 2 (Two) Times a Day., Disp: 180 tablet, Rfl: 1    CAFFEINE PO, Take  by mouth., Disp: , Rfl:     cetirizine (ZyrTEC) 10 MG tablet, Take 1 tablet by mouth Every Night., Disp: , Rfl:     Cholecalciferol (VITAMIN D-3 PO), Take 4,000 Units by mouth Daily., Disp: , Rfl:     famotidine (PEPCID) 40 MG tablet, TAKE ONE TABLET BY MOUTH EVERY NIGHT AT BEDTIME, Disp: 90 tablet, Rfl:  4    flunisolide (NASALIDE) 25 MCG/ACT (0.025%) solution nasal spray, Inhale 1 spray Every 12 (Twelve) Hours., Disp: 25 mL, Rfl: 0    Fluticasone-Salmeterol (ADVAIR/WIXELA) 250-50 MCG/ACT DISKUS, Inhale 1 puff 2 (Two) Times a Day., Disp: 60 each, Rfl: 0    gabapentin (NEURONTIN) 600 MG tablet, Take 1 tablet by mouth 3 (Three) Times a Day., Disp: 270 tablet, Rfl: 1    glucosamine-chondroitin 500-400 MG capsule capsule, Take  by mouth 3 (Three) Times a Day With Meals., Disp: , Rfl:     HYDROcodone-acetaminophen (NORCO) 5-325 MG per tablet, Take 1 tablet by mouth Every 6 (Six) Hours As Needed for Moderate Pain., Disp: 12 tablet, Rfl: 0    lidocaine (LIDODERM) 5 %, APPLY 1 PATCH TO AFFECTED AREA FOR 12 HOURS IN A 24 HOUR PERIOD, Disp: 30 patch, Rfl: 6    melatonin 5 MG tablet tablet, melatonin 5 mg tablet  Take by oral route., Disp: , Rfl:     meloxicam (MOBIC) 15 MG tablet, TAKE 1 TABLET BY MOUTH DAILY AS NEEDED FOR MODERATE PAIN, Disp: 90 tablet, Rfl: 1    pantoprazole (PROTONIX) 40 MG EC tablet, TAKE ONE TABLET BY MOUTH DAILY, Disp: 90 tablet, Rfl: 4    traMADol (ULTRAM) 50 MG tablet, Every 12 (Twelve) Hours., Disp: , Rfl:     traZODone (DESYREL) 50 MG tablet, TAKE ONE TABLET BY MOUTH ONCE NIGHTLY, Disp: 90 tablet, Rfl: 1    ondansetron (ZOFRAN) 4 MG tablet, Take 1 tablet by mouth Every 8 (Eight) Hours As Needed for Nausea. (Patient not taking: Reported on 6/25/2024), Disp: 15 tablet, Rfl: 0    tamsulosin (FLOMAX) 0.4 MG capsule 24 hr capsule, Take 1 capsule by mouth Daily. (Patient not taking: Reported on 6/25/2024), Disp: 10 capsule, Rfl: 0    Allergies:   Allergies   Allergen Reactions    Promethazine Other (See Comments)     EPS SYMPTOMS    Codeine Rash    Penicillins Rash    Sulfa Antibiotics Rash       Objective     Physical Exam:   Vital Signs: There were no vitals filed for this visit.  There is no height or weight on file to calculate BMI.     Physical Exam  Vitals and nursing note reviewed.    Constitutional:       Appearance: Normal appearance.   HENT:      Head: Normocephalic and atraumatic.   Cardiovascular:      Comments: Well perfused  Pulmonary:      Effort: Pulmonary effort is normal.   Abdominal:      General: Abdomen is flat.      Palpations: Abdomen is soft.   Musculoskeletal:         General: Normal range of motion.   Skin:     General: Skin is warm and dry.   Neurological:      General: No focal deficit present.      Mental Status: She is alert and oriented to person, place, and time. Mental status is at baseline.   Psychiatric:         Mood and Affect: Mood normal.         Behavior: Behavior normal.         Thought Content: Thought content normal.         Judgment: Judgment normal.         Labs:   Brief Urine Lab Results  (Last result in the past 365 days)        Color   Clarity   Blood   Leuk Est   Nitrite   Protein   CREAT   Urine HCG        06/21/24 2305 Yellow   Clear   Moderate (2+)   Small (1+)   Negative   30 mg/dL (1+)                   Urine Culture          6/21/2024    23:05   Urine Culture   Urine Culture No growth         Lab Results   Component Value Date    GLUCOSE 83 06/21/2024    CALCIUM 9.3 06/21/2024     06/21/2024    K 4.0 06/21/2024    CO2 23.0 06/21/2024     06/21/2024    BUN 21 (H) 06/21/2024    CREATININE 1.15 (H) 06/21/2024    EGFRIFAFRI 73 03/04/2021    EGFRIFNONA 60 (L) 03/04/2021    BCR 18.3 06/21/2024    ANIONGAP 10.0 06/21/2024       Lab Results   Component Value Date    WBC 10.53 06/21/2024    HGB 13.2 06/21/2024    HCT 38.4 06/21/2024    MCV 89.1 06/21/2024     06/21/2024         Images:   CT Abdomen Pelvis Stone Protocol    Result Date: 7/8/2024  Impression: 1.No acute abnormality identified within the abdomen or pelvis. 2.Left nonobstructive nephrolithiasis. 3.Previously noted left UVJ calculus is no longer seen. Left hydronephrosis has resolved since the prior study. Electronically Signed: John Paul Stevenson MD  7/8/2024 9:54 AM EDT   Workstation ID: [id number]    CT Abdomen Pelvis Without Contrast    Result Date: 6/21/2024  Impression: 0.5 cm obstructing stone within the left ureterovesicular junction causing mild left-sided hydronephrosis and hydroureter. Additional 0.3 cm nonobstructing stone in the left mid kidney. No obstructing stones or hydronephrosis on the right Electronically Signed: Mak Spear, DO  6/21/2024 10:04 PM EDT  Workstation ID: [id number]      Measures:   Tobacco:   Eugenie Rapp  reports that she has never smoked. She has never been exposed to tobacco smoke. She has never used smokeless tobacco. I have educated her on the risk of diseases from using tobacco product    Assessment / Plan      Assessment/Plan:   Eugenie Rapp is a 53 y.o. female who presented today with nephrolithiasis/ureterolithiasis.  She presents today with repeat cross-sectional imaging was has confirm passage of a recent 5 mm distal left ureteral stone, resolution of hydronephrosis.  She continues to have nonobstructing left renal stone.  Given recent acute stone episode patient reports desire for operative intervention, management of renal stone to prevent future acute stone episode.  We have discussed the risks and benefits of ureteroscopy and laser lithotripsy versus ESWL.  She would like to proceed with ESWL.  We have discussed specific procedural risks and benefits, we will coordinate and schedule pending patient in OR availability.    Diagnoses and all orders for this visit:    1. Renal stone (Primary)  -     acetaminophen (TYLENOL) tablet 1,000 mg  -     gabapentin (NEURONTIN) capsule 600 mg  -     scopolamine patch 1 mg/72 hr  -     Case Request; Standing  -     ceFAZolin (ANCEF) 2,000 mg in sodium chloride 0.9 % 100 mL IVPB  -     Case Request    2. Nephrolithiasis    3. Left ureteral calculus    Other orders  -     Follow Anesthesia Guidelines / Protocol; Future  -     Provide NPO Instructions to Patient; Future  -     Provide Hydration  Instructions to Patient; Future  -     Provide Patient With Enhanced Recovery Booklet(s) or Handout; Future  -     Provide Chlorhexidine Skin Prep Wipes and Instructions; Future  -     Obtain Informed Consent; Future  -     Nursing to Order Blood Glucose on all Inpatients >18 years Old with not BMP Ordered; Future  -     Nursing to Place Order for HgbA1c on Adult Patients >18 Years Old (HgbA1c Within One Month of Admission Acceptable); Future  -     Follow Anesthesia Guidelines / Protocol; Standing  -     Provide Patient With Enhanced Recovery Booklet(s) OR Handout; Standing  -     Verify NPO Status; Standing  -     Verify the Time Patient Completed [place] / G2; Standing  -     Place Sequential Compression Device; Standing  -     Maintain Sequential Compression Device; Standing             I spent approximately 30 minutes providing clinical care for this patient; including review of patient's chart and provider documentation, face to face time spent with patient in examination room (obtaining history, performing physical exam, discussing diagnosis and management options), placing orders, and completing patient documentation.     Familia Montes MD  [facility] [place] [facility] [place]

## 2024-07-12 NOTE — PROGRESS NOTES
Office Note Kidney Stone      Patient Name: Eugenie Rapp  : 1970   MRN: [id number]     Chief Complaint: History of Nephrolithiasis/Ureterolithiasis       History of Present Illness: Eugenie Rapp is a 53 y.o. female who presents today for continued evaluation secondary to history of nephrolithiasis, ureterolithiasis.  She has undergone repeat cross-sectional imaging to evaluate, confirm passage of ureteral stone.  She denies current flank pain, lower urinary tract symptoms.        Subjective      Review of System: Review of Systems   Genitourinary:  Negative for decreased urine volume, difficulty urinating, dysuria, enuresis, flank pain, frequency, hematuria and urgency.        I have reviewed the ROS documented by my clinical staff, updated as appropriate and I agree. 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    Partial blindness     LEFT EYE <1/4 VISUAL FIELD AFFECTED FROM MICROSTROKE TO RETINA    Plantar fasciitis     BILAT FEET    PMS (premenstrual syndrome)     PONV (postoperative nausea and vomiting)     VOMITS ALL DAY    Retinal thrombosis     Sciatica of left side     Seasonal allergies     Stroke 15 years ago    Micro stroke left retina    Tennis elbow     LEFT    Urinary tract infection ~10 years ago    One time    Varicella Childhood    Vitamin D deficiency        Past Surgical History:   Past Surgical History:   Procedure Laterality Date    CHOLECYSTECTOMY      COLONOSCOPY      KNEE CARTILAGE SURGERY      LAPAROSCOPIC CHOLECYSTECTOMY   ~ 13 years    LASIK      POSTERIOR VAGINAL REPAIR      TENNIS ELBOW RELEASE Left 10/03/2017    Procedure: LEFT ELBOW OPEN EXTENSOR TENDON DEBRIDEMENT AND REPAIR;  Surgeon: TASH Aly MD;  Location: [facility] OR [facility];  Service:     ULNAR NERVE DECOMPRESSION Left     WISDOM TOOTH EXTRACTION         Family History:   Family History   Problem Relation Age of Onset    Leukemia Brother     Alcohol abuse Brother     Cancer Brother         Leukemia    Hypertension Mother     Stroke Father     Hypertension Father     Vision loss Brother         Genetic    Malig Hyperthermia Neg Hx     Breast cancer Neg Hx     Ovarian cancer Neg Hx     Colon cancer Neg Hx     Deep vein thrombosis Neg Hx     Pulmonary embolism Neg Hx        Social History:   Social History     Socioeconomic History    Marital status:    Tobacco Use    Smoking status: Never     Passive exposure: Never    Smokeless tobacco: Never   Vaping Use    Vaping status: Never Used   Substance and Sexual Activity    Alcohol use: No     Comment: RARE MONTHLY    Drug use: Never    Sexual activity: Yes     Partners: Male     Birth control/protection: Post-menopausal, Surgical     Comment: vas       Medications:     Current Outpatient Medications:     albuterol sulfate  (90 Base) MCG/ACT inhaler, Inhale 2 puffs Every 6 (Six) Hours As Needed for Wheezing., Disp: 18 g, Rfl: 1    BLACK COHOSH PO, Take  by mouth., Disp: , Rfl:     buPROPion XL (WELLBUTRIN XL) 300 MG 24 hr tablet, Take 1 tablet by mouth Every Morning., Disp: 90 tablet, Rfl: 4    busPIRone (BUSPAR) 15 MG tablet, Take 1 tablet by mouth 2 (Two) Times a Day., Disp: 180 tablet, Rfl: 1    CAFFEINE PO, Take  by mouth., Disp: , Rfl:     cetirizine (ZyrTEC) 10 MG tablet, Take 1 tablet by mouth Every Night., Disp: , Rfl:     Cholecalciferol (VITAMIN D-3 PO), Take 4,000 Units by mouth Daily., Disp: , Rfl:     famotidine (PEPCID) 40 MG tablet, TAKE ONE TABLET BY MOUTH EVERY NIGHT AT BEDTIME, Disp: 90 tablet, Rfl:  4    flunisolide (NASALIDE) 25 MCG/ACT (0.025%) solution nasal spray, Inhale 1 spray Every 12 (Twelve) Hours., Disp: 25 mL, Rfl: 0    Fluticasone-Salmeterol (ADVAIR/WIXELA) 250-50 MCG/ACT DISKUS, Inhale 1 puff 2 (Two) Times a Day., Disp: 60 each, Rfl: 0    gabapentin (NEURONTIN) 600 MG tablet, Take 1 tablet by mouth 3 (Three) Times a Day., Disp: 270 tablet, Rfl: 1    glucosamine-chondroitin 500-400 MG capsule capsule, Take  by mouth 3 (Three) Times a Day With Meals., Disp: , Rfl:     HYDROcodone-acetaminophen (NORCO) 5-325 MG per tablet, Take 1 tablet by mouth Every 6 (Six) Hours As Needed for Moderate Pain., Disp: 12 tablet, Rfl: 0    lidocaine (LIDODERM) 5 %, APPLY 1 PATCH TO AFFECTED AREA FOR 12 HOURS IN A 24 HOUR PERIOD, Disp: 30 patch, Rfl: 6    melatonin 5 MG tablet tablet, melatonin 5 mg tablet  Take by oral route., Disp: , Rfl:     meloxicam (MOBIC) 15 MG tablet, TAKE 1 TABLET BY MOUTH DAILY AS NEEDED FOR MODERATE PAIN, Disp: 90 tablet, Rfl: 1    pantoprazole (PROTONIX) 40 MG EC tablet, TAKE ONE TABLET BY MOUTH DAILY, Disp: 90 tablet, Rfl: 4    traMADol (ULTRAM) 50 MG tablet, Every 12 (Twelve) Hours., Disp: , Rfl:     traZODone (DESYREL) 50 MG tablet, TAKE ONE TABLET BY MOUTH ONCE NIGHTLY, Disp: 90 tablet, Rfl: 1    ondansetron (ZOFRAN) 4 MG tablet, Take 1 tablet by mouth Every 8 (Eight) Hours As Needed for Nausea. (Patient not taking: Reported on 6/25/2024), Disp: 15 tablet, Rfl: 0    tamsulosin (FLOMAX) 0.4 MG capsule 24 hr capsule, Take 1 capsule by mouth Daily. (Patient not taking: Reported on 6/25/2024), Disp: 10 capsule, Rfl: 0    Allergies:   Allergies   Allergen Reactions    Promethazine Other (See Comments)     EPS SYMPTOMS    Codeine Rash    Penicillins Rash    Sulfa Antibiotics Rash       Objective     Physical Exam:   Vital Signs: There were no vitals filed for this visit.  There is no height or weight on file to calculate BMI.     Physical Exam  Vitals and nursing note reviewed.    Constitutional:       Appearance: Normal appearance.   HENT:      Head: Normocephalic and atraumatic.   Cardiovascular:      Comments: Well perfused  Pulmonary:      Effort: Pulmonary effort is normal.   Abdominal:      General: Abdomen is flat.      Palpations: Abdomen is soft.   Musculoskeletal:         General: Normal range of motion.   Skin:     General: Skin is warm and dry.   Neurological:      General: No focal deficit present.      Mental Status: She is alert and oriented to person, place, and time. Mental status is at baseline.   Psychiatric:         Mood and Affect: Mood normal.         Behavior: Behavior normal.         Thought Content: Thought content normal.         Judgment: Judgment normal.         Labs:   Brief Urine Lab Results  (Last result in the past 365 days)        Color   Clarity   Blood   Leuk Est   Nitrite   Protein   CREAT   Urine HCG        06/21/24 2305 Yellow   Clear   Moderate (2+)   Small (1+)   Negative   30 mg/dL (1+)                   Urine Culture          6/21/2024    23:05   Urine Culture   Urine Culture No growth         Lab Results   Component Value Date    GLUCOSE 83 06/21/2024    CALCIUM 9.3 06/21/2024     06/21/2024    K 4.0 06/21/2024    CO2 23.0 06/21/2024     06/21/2024    BUN 21 (H) 06/21/2024    CREATININE 1.15 (H) 06/21/2024    EGFRIFAFRI 73 03/04/2021    EGFRIFNONA 60 (L) 03/04/2021    BCR 18.3 06/21/2024    ANIONGAP 10.0 06/21/2024       Lab Results   Component Value Date    WBC 10.53 06/21/2024    HGB 13.2 06/21/2024    HCT 38.4 06/21/2024    MCV 89.1 06/21/2024     06/21/2024         Images:   CT Abdomen Pelvis Stone Protocol    Result Date: 7/8/2024  Impression: 1.No acute abnormality identified within the abdomen or pelvis. 2.Left nonobstructive nephrolithiasis. 3.Previously noted left UVJ calculus is no longer seen. Left hydronephrosis has resolved since the prior study. Electronically Signed: John Paul Stevenson MD  7/8/2024 9:54 AM EDT   Workstation ID: [id number]    CT Abdomen Pelvis Without Contrast    Result Date: 6/21/2024  Impression: 0.5 cm obstructing stone within the left ureterovesicular junction causing mild left-sided hydronephrosis and hydroureter. Additional 0.3 cm nonobstructing stone in the left mid kidney. No obstructing stones or hydronephrosis on the right Electronically Signed: Mak Spear, DO  6/21/2024 10:04 PM EDT  Workstation ID: [id number]      Measures:   Tobacco:   Eugenie Rapp  reports that she has never smoked. She has never been exposed to tobacco smoke. She has never used smokeless tobacco. I have educated her on the risk of diseases from using tobacco product    Assessment / Plan      Assessment/Plan:   Eugenie Rapp is a 53 y.o. female who presented today with nephrolithiasis/ureterolithiasis.  She presents today with repeat cross-sectional imaging was has confirm passage of a recent 5 mm distal left ureteral stone, resolution of hydronephrosis.  She continues to have nonobstructing left renal stone.  Given recent acute stone episode patient reports desire for operative intervention, management of renal stone to prevent future acute stone episode.  We have discussed the risks and benefits of ureteroscopy and laser lithotripsy versus ESWL.  She would like to proceed with ESWL.  We have discussed specific procedural risks and benefits, we will coordinate and schedule pending patient in OR availability.    Diagnoses and all orders for this visit:    1. Renal stone (Primary)  -     acetaminophen (TYLENOL) tablet 1,000 mg  -     gabapentin (NEURONTIN) capsule 600 mg  -     scopolamine patch 1 mg/72 hr  -     Case Request; Standing  -     ceFAZolin (ANCEF) 2,000 mg in sodium chloride 0.9 % 100 mL IVPB  -     Case Request    2. Nephrolithiasis    3. Left ureteral calculus    Other orders  -     Follow Anesthesia Guidelines / Protocol; Future  -     Provide NPO Instructions to Patient; Future  -     Provide Hydration  Instructions to Patient; Future  -     Provide Patient With Enhanced Recovery Booklet(s) or Handout; Future  -     Provide Chlorhexidine Skin Prep Wipes and Instructions; Future  -     Obtain Informed Consent; Future  -     Nursing to Order Blood Glucose on all Inpatients >18 years Old with not BMP Ordered; Future  -     Nursing to Place Order for HgbA1c on Adult Patients >18 Years Old (HgbA1c Within One Month of Admission Acceptable); Future  -     Follow Anesthesia Guidelines / Protocol; Standing  -     Provide Patient With Enhanced Recovery Booklet(s) OR Handout; Standing  -     Verify NPO Status; Standing  -     Verify the Time Patient Completed Gatorade / G2; Standing  -     Place Sequential Compression Device; Standing  -     Maintain Sequential Compression Device; Standing             I spent approximately 30 minutes providing clinical care for this patient; including review of patient's chart and provider documentation, face to face time spent with patient in examination room (obtaining history, performing physical exam, discussing diagnosis and management options), placing orders, and completing patient documentation.     Familia Montes MD  [facility]

## 2024-07-17 ENCOUNTER — PRE-ADMISSION TESTING (OUTPATIENT)
Dept: PREADMISSION TESTING | Facility: HOSPITAL | Age: 54
End: 2024-07-17
Payer: COMMERCIAL

## 2024-07-17 VITALS — WEIGHT: 210.32 LBS | BODY MASS INDEX: 31.15 KG/M2 | HEIGHT: 69 IN

## 2024-07-17 LAB
DEPRECATED RDW RBC AUTO: 40.7 FL (ref 37–54)
ERYTHROCYTE [DISTWIDTH] IN BLOOD BY AUTOMATED COUNT: 12.4 % (ref 12.3–15.4)
HCT VFR BLD AUTO: 41.1 % (ref 34–46.6)
HGB BLD-MCNC: 13.9 G/DL (ref 12–15.9)
MCH RBC QN AUTO: 30.6 PG (ref 26.6–33)
MCHC RBC AUTO-ENTMCNC: 33.8 G/DL (ref 31.5–35.7)
MCV RBC AUTO: 90.5 FL (ref 79–97)
PLATELET # BLD AUTO: 314 10*3/MM3 (ref 140–450)
PMV BLD AUTO: 9.4 FL (ref 6–12)
QT INTERVAL: 390 MS
QTC INTERVAL: 390 MS
RBC # BLD AUTO: 4.54 10*6/MM3 (ref 3.77–5.28)
WBC NRBC COR # BLD AUTO: 5.76 10*3/MM3 (ref 3.4–10.8)

## 2024-07-17 PROCEDURE — 85027 COMPLETE CBC AUTOMATED: CPT

## 2024-07-17 PROCEDURE — 93005 ELECTROCARDIOGRAM TRACING: CPT

## 2024-07-17 PROCEDURE — 36415 COLL VENOUS BLD VENIPUNCTURE: CPT

## 2024-07-17 NOTE — DISCHARGE INSTRUCTIONS
Patient instructed to drink 20 ounces of Gatorade or Gatorlyte (if diabetic) and it needs to be completed 1 hour (for Main OR patients) or 2 hours (scheduled  section & [facility] patients) before given arrival time for procedure (NO RED Gatorade and NO Gatorade Zero).    Patient verbalized understanding.     Patient viewed general PAT education video as instructed in their preoperative information received from their surgeon.  Patient stated the general PAT education video was viewed in its entirety and survey completed.  Copies of PAT general education handouts (Incentive Spirometry, Meds to Beds Program, Patient Belongings, Pre-op skin preparation instructions, Blood Glucose testing, Visitor policy, Surgery FAQ, Code H) distributed to patient if not printed. Education related to the PAT pass and skin preparation for surgery (if applicable) completed in PAT as a reinforcement to PAT education video. Patient instructed to return PAT pass provided today as well as completed skin preparation sheet (if applicable) on the day of procedure.     Additionally if patient had not viewed video yet but intended to view it at home or in our waiting area, then referred them to the handout with QR code/link provided during PAT visit.  Instructed patient to complete survey after viewing the video in its entirety.  Encouraged patient/family to read PAT general education handouts thoroughly and notify PAT staff with any questions or concerns. Patient verbalized understanding of all information and priority content.

## 2024-07-17 NOTE — PAT
Unable to review medications, pt does not remember meds/ doses. Instructed to bring med bottles to preop.    Patient instructed to drink 20 ounces of Gatorade or Gatorlyte (if diabetic) and it needs to be completed 1 hour (for Main OR patients) or 2 hours (scheduled  section & [facility] patients) before given arrival time for procedure (NO RED Gatorade and NO Gatorade Zero).    Patient verbalized understanding.     An arrival time for procedure was not provided during PAT visit. If patient had any questions or concerns about their arrival time, they were instructed to contact their surgeon/physician.  Additionally, if the patient referred to an arrival time that was acquired from their my chart account, patient was encouraged to verify that time with their surgeon/physician. Arrival times are NOT provided in Pre Admission Testing Department.     Patient denies any current skin issues.

## 2024-07-21 ENCOUNTER — ANESTHESIA EVENT (OUTPATIENT)
Dept: PERIOP | Facility: HOSPITAL | Age: 54
End: 2024-07-21
Payer: COMMERCIAL

## 2024-07-21 RX ORDER — FAMOTIDINE 10 MG/ML
20 INJECTION, SOLUTION INTRAVENOUS ONCE
Status: CANCELLED | OUTPATIENT
Start: 2024-07-21 | End: 2024-07-21

## 2024-07-22 ENCOUNTER — ANESTHESIA (OUTPATIENT)
Dept: PERIOP | Facility: HOSPITAL | Age: 54
End: 2024-07-22
Payer: COMMERCIAL

## 2024-07-22 ENCOUNTER — HOSPITAL ENCOUNTER (OUTPATIENT)
Facility: HOSPITAL | Age: 54
Setting detail: HOSPITAL OUTPATIENT SURGERY
Discharge: HOME | End: 2024-07-22
Attending: UROLOGY | Admitting: UROLOGY
Payer: COMMERCIAL

## 2024-07-22 VITALS
RESPIRATION RATE: 16 BRPM | TEMPERATURE: 98 F | SYSTOLIC BLOOD PRESSURE: 124 MMHG | OXYGEN SATURATION: 98 % | DIASTOLIC BLOOD PRESSURE: 82 MMHG | HEART RATE: 56 BPM

## 2024-07-22 DIAGNOSIS — N20.0 NEPHROLITHIASIS: Primary | ICD-10-CM

## 2024-07-22 DIAGNOSIS — N20.1 LEFT URETERAL CALCULUS: Primary | ICD-10-CM

## 2024-07-22 DIAGNOSIS — N20.0 RENAL STONE: ICD-10-CM

## 2024-07-22 PROCEDURE — 25010000002 FENTANYL CITRATE (PF) 50 MCG/ML SOLUTION

## 2024-07-22 PROCEDURE — 25010000002 CEFAZOLIN PER 500 MG: Performed by: UROLOGY

## 2024-07-22 PROCEDURE — 25810000003 LACTATED RINGERS PER 1000 ML: Performed by: ANESTHESIOLOGY

## 2024-07-22 PROCEDURE — 25010000002 DEXAMETHASONE PER 1 MG: Performed by: ANESTHESIOLOGY

## 2024-07-22 PROCEDURE — 25010000002 ONDANSETRON PER 1 MG: Performed by: ANESTHESIOLOGY

## 2024-07-22 PROCEDURE — 25010000002 PROPOFOL 10 MG/ML EMULSION: Performed by: ANESTHESIOLOGY

## 2024-07-22 PROCEDURE — 25010000002 FENTANYL CITRATE (PF) 100 MCG/2ML SOLUTION: Performed by: ANESTHESIOLOGY

## 2024-07-22 PROCEDURE — 50590 FRAGMENTING OF KIDNEY STONE: CPT | Performed by: UROLOGY

## 2024-07-22 RX ORDER — FENTANYL CITRATE 50 UG/ML
INJECTION, SOLUTION INTRAMUSCULAR; INTRAVENOUS
Status: COMPLETED
Start: 2024-07-22 | End: 2024-07-22

## 2024-07-22 RX ORDER — PHENAZOPYRIDINE HYDROCHLORIDE 200 MG/1
200 TABLET, FILM COATED ORAL 3 TIMES DAILY PRN
Qty: 20 TABLET | Refills: 0 | Status: SHIPPED | OUTPATIENT
Start: 2024-07-22

## 2024-07-22 RX ORDER — PROPOFOL 10 MG/ML
VIAL (ML) INTRAVENOUS AS NEEDED
Status: DISCONTINUED | OUTPATIENT
Start: 2024-07-22 | End: 2024-07-22 | Stop reason: SURG

## 2024-07-22 RX ORDER — SCOLOPAMINE TRANSDERMAL SYSTEM 1 MG/1
1 PATCH, EXTENDED RELEASE TRANSDERMAL ONCE
Status: DISCONTINUED | OUTPATIENT
Start: 2024-07-22 | End: 2024-07-22 | Stop reason: HOSPADM

## 2024-07-22 RX ORDER — ONDANSETRON 2 MG/ML
INJECTION INTRAMUSCULAR; INTRAVENOUS AS NEEDED
Status: DISCONTINUED | OUTPATIENT
Start: 2024-07-22 | End: 2024-07-22 | Stop reason: SURG

## 2024-07-22 RX ORDER — FENTANYL CITRATE 50 UG/ML
INJECTION, SOLUTION INTRAMUSCULAR; INTRAVENOUS AS NEEDED
Status: DISCONTINUED | OUTPATIENT
Start: 2024-07-22 | End: 2024-07-22 | Stop reason: SURG

## 2024-07-22 RX ORDER — SODIUM CHLORIDE, SODIUM LACTATE, POTASSIUM CHLORIDE, CALCIUM CHLORIDE 600; 310; 30; 20 MG/100ML; MG/100ML; MG/100ML; MG/100ML
9 INJECTION, SOLUTION INTRAVENOUS CONTINUOUS
Status: DISCONTINUED | OUTPATIENT
Start: 2024-07-22 | End: 2024-07-22 | Stop reason: HOSPADM

## 2024-07-22 RX ORDER — FENTANYL CITRATE 50 UG/ML
50 INJECTION, SOLUTION INTRAMUSCULAR; INTRAVENOUS
Status: DISCONTINUED | OUTPATIENT
Start: 2024-07-22 | End: 2024-07-22 | Stop reason: HOSPADM

## 2024-07-22 RX ORDER — ACETAMINOPHEN 500 MG
1000 TABLET ORAL ONCE
Status: COMPLETED | OUTPATIENT
Start: 2024-07-22 | End: 2024-07-22

## 2024-07-22 RX ORDER — SODIUM CHLORIDE 0.9 % (FLUSH) 0.9 %
10 SYRINGE (ML) INJECTION AS NEEDED
Status: DISCONTINUED | OUTPATIENT
Start: 2024-07-22 | End: 2024-07-22 | Stop reason: HOSPADM

## 2024-07-22 RX ORDER — TAMSULOSIN HYDROCHLORIDE 0.4 MG/1
1 CAPSULE ORAL DAILY
Qty: 14 CAPSULE | Refills: 0 | Status: SHIPPED | OUTPATIENT
Start: 2024-07-22 | End: 2024-08-05

## 2024-07-22 RX ORDER — OXYBUTYNIN CHLORIDE 5 MG/1
5 TABLET, EXTENDED RELEASE ORAL DAILY
Qty: 14 TABLET | Refills: 0 | Status: SHIPPED | OUTPATIENT
Start: 2024-07-22

## 2024-07-22 RX ORDER — METHOCARBAMOL 500 MG/1
500 TABLET, FILM COATED ORAL 3 TIMES DAILY
Qty: 15 TABLET | Refills: 0 | Status: SHIPPED | OUTPATIENT
Start: 2024-07-22

## 2024-07-22 RX ORDER — DROPERIDOL 2.5 MG/ML
INJECTION, SOLUTION INTRAMUSCULAR; INTRAVENOUS
Status: DISCONTINUED
Start: 2024-07-22 | End: 2024-07-22 | Stop reason: HOSPADM

## 2024-07-22 RX ORDER — SODIUM CHLORIDE 9 MG/ML
40 INJECTION, SOLUTION INTRAVENOUS AS NEEDED
Status: DISCONTINUED | OUTPATIENT
Start: 2024-07-22 | End: 2024-07-22 | Stop reason: HOSPADM

## 2024-07-22 RX ORDER — HYDROMORPHONE HYDROCHLORIDE 1 MG/ML
0.5 INJECTION, SOLUTION INTRAMUSCULAR; INTRAVENOUS; SUBCUTANEOUS
Status: DISCONTINUED | OUTPATIENT
Start: 2024-07-22 | End: 2024-07-22 | Stop reason: HOSPADM

## 2024-07-22 RX ORDER — SODIUM CHLORIDE 0.9 % (FLUSH) 0.9 %
10 SYRINGE (ML) INJECTION EVERY 12 HOURS SCHEDULED
Status: DISCONTINUED | OUTPATIENT
Start: 2024-07-22 | End: 2024-07-22 | Stop reason: HOSPADM

## 2024-07-22 RX ORDER — DEXAMETHASONE SODIUM PHOSPHATE 4 MG/ML
INJECTION, SOLUTION INTRA-ARTICULAR; INTRALESIONAL; INTRAMUSCULAR; INTRAVENOUS; SOFT TISSUE AS NEEDED
Status: DISCONTINUED | OUTPATIENT
Start: 2024-07-22 | End: 2024-07-22 | Stop reason: SURG

## 2024-07-22 RX ORDER — FAMOTIDINE 20 MG/1
20 TABLET, FILM COATED ORAL ONCE
Status: COMPLETED | OUTPATIENT
Start: 2024-07-22 | End: 2024-07-22

## 2024-07-22 RX ORDER — GABAPENTIN 300 MG/1
600 CAPSULE ORAL ONCE
Status: COMPLETED | OUTPATIENT
Start: 2024-07-22 | End: 2024-07-22

## 2024-07-22 RX ORDER — DROPERIDOL 2.5 MG/ML
0.62 INJECTION, SOLUTION INTRAMUSCULAR; INTRAVENOUS ONCE AS NEEDED
Status: DISCONTINUED | OUTPATIENT
Start: 2024-07-22 | End: 2024-07-22 | Stop reason: HOSPADM

## 2024-07-22 RX ORDER — LIDOCAINE HYDROCHLORIDE 10 MG/ML
0.5 INJECTION, SOLUTION EPIDURAL; INFILTRATION; INTRACAUDAL; PERINEURAL ONCE AS NEEDED
Status: COMPLETED | OUTPATIENT
Start: 2024-07-22 | End: 2024-07-22

## 2024-07-22 RX ORDER — MIDAZOLAM HYDROCHLORIDE 1 MG/ML
1 INJECTION INTRAMUSCULAR; INTRAVENOUS
Status: DISCONTINUED | OUTPATIENT
Start: 2024-07-22 | End: 2024-07-22 | Stop reason: HOSPADM

## 2024-07-22 RX ORDER — SCOLOPAMINE TRANSDERMAL SYSTEM 1 MG/1
1 PATCH, EXTENDED RELEASE TRANSDERMAL CONTINUOUS
Status: DISCONTINUED | OUTPATIENT
Start: 2024-07-22 | End: 2024-07-22

## 2024-07-22 RX ORDER — LIDOCAINE HYDROCHLORIDE 10 MG/ML
INJECTION, SOLUTION EPIDURAL; INFILTRATION; INTRACAUDAL; PERINEURAL AS NEEDED
Status: DISCONTINUED | OUTPATIENT
Start: 2024-07-22 | End: 2024-07-22 | Stop reason: SURG

## 2024-07-22 RX ADMIN — SODIUM CHLORIDE 2000 MG: 900 INJECTION INTRAVENOUS at 15:07

## 2024-07-22 RX ADMIN — FENTANYL CITRATE 50 MCG: 50 INJECTION, SOLUTION INTRAMUSCULAR; INTRAVENOUS at 16:52

## 2024-07-22 RX ADMIN — SCOPOLAMINE 1 PATCH: 1.5 PATCH, EXTENDED RELEASE TRANSDERMAL at 13:57

## 2024-07-22 RX ADMIN — SODIUM CHLORIDE, POTASSIUM CHLORIDE, SODIUM LACTATE AND CALCIUM CHLORIDE 9 ML/HR: 600; 310; 30; 20 INJECTION, SOLUTION INTRAVENOUS at 13:55

## 2024-07-22 RX ADMIN — LIDOCAINE HYDROCHLORIDE 100 MG: 10 SOLUTION INTRAVENOUS at 15:17

## 2024-07-22 RX ADMIN — ONDANSETRON 8 MG: 2 INJECTION INTRAMUSCULAR; INTRAVENOUS at 15:23

## 2024-07-22 RX ADMIN — FAMOTIDINE 20 MG: 20 TABLET, FILM COATED ORAL at 13:58

## 2024-07-22 RX ADMIN — ACETAMINOPHEN 1000 MG: 500 TABLET ORAL at 13:58

## 2024-07-22 RX ADMIN — DEXAMETHASONE SODIUM PHOSPHATE 4 MG: 4 INJECTION, SOLUTION INTRA-ARTICULAR; INTRALESIONAL; INTRAMUSCULAR; INTRAVENOUS; SOFT TISSUE at 15:23

## 2024-07-22 RX ADMIN — PROPOFOL 150 MG: 10 INJECTION, EMULSION INTRAVENOUS at 15:17

## 2024-07-22 RX ADMIN — LIDOCAINE HYDROCHLORIDE 0.5 ML: 10 INJECTION, SOLUTION EPIDURAL; INFILTRATION; INTRACAUDAL; PERINEURAL at 13:58

## 2024-07-22 RX ADMIN — FENTANYL CITRATE 100 MCG: 50 INJECTION, SOLUTION INTRAMUSCULAR; INTRAVENOUS at 15:17

## 2024-07-22 RX ADMIN — GABAPENTIN 600 MG: 300 CAPSULE ORAL at 13:58

## 2024-07-22 NOTE — ANESTHESIA PROCEDURE NOTES
Airway  Urgency: elective    Date/Time: 7/22/2024 3:18 PM  Airway not difficult    General Information and Staff    Patient location during procedure: OR  SRNA: Sarabjit Albarran SRNA  Indications and Patient Condition  Indications for airway management: airway protection    Preoxygenated: yes  Mask difficulty assessment: 0 - not attempted    Final Airway Details  Final airway type: supraglottic airway      Successful airway: I-gel  Size 4     Number of attempts at approach: 1  Assessment: lips, teeth, and gum same as pre-op    Additional Comments  LMA placed without difficulty, ventilation with assist, equal breath sounds and symmetric chest rise and fall

## 2024-07-22 NOTE — ANESTHESIA POSTPROCEDURE EVALUATION
Patient: Eugenie Rapp    Procedure Summary       Date: 07/22/24 Room / Location:  [facility] /  [facility]    Anesthesia Start: 1507 Anesthesia Stop: 1617    Procedure: EXTRACORPOREAL SHOCKWAVE LITHOTRIPSY LEFT (Left) Diagnosis:       Renal stone      (Renal stone [N20.0])    Surgeons: Familia Montes MD Provider: Gonsalo Lopez MD    Anesthesia Type: general ASA Status: 2            Anesthesia Type: general    Vitals  Vitals Value Taken Time   /68 07/22/24 1617   Temp 97.4 °F (36.3 °C) 07/22/24 1617   Pulse 64 07/22/24 1617   Resp 16 07/22/24 1617   SpO2 100 % 07/22/24 1617           Post Anesthesia Care and Evaluation    Patient location during evaluation: PACU  Patient participation: complete - patient participated  Level of consciousness: sleepy but conscious  Pain management: adequate    Airway patency: patent  Anesthetic complications: No anesthetic complications  PONV Status: none  Cardiovascular status: hemodynamically stable and acceptable  Respiratory status: nonlabored ventilation, acceptable, nasal cannula and oral airway  Hydration status: acceptable

## 2024-07-22 NOTE — DISCHARGE INSTRUCTIONS
[facility] POST-OPERATIVE INSTRUCTIONS  ACTIVITY   Go home and rest for the remainder of the day    Your urine will be bloody and should begin to clear a few days   after treatment. Drink plenty of water.    You may have some bruising, redness or slight swelling at your   treatment site. Apply an ice pack to the affected area for any   swelling you may have today. After 24 hours, use warm heat,   along with your pain medication for relief.    Strain your urine for 2-3 weeks as directed. Take any fragments   you collect to your doctor for analysis.    Drink lots of water in the weeks after treatment. This helps pass   any pieces of stone that still remain.     MEDICATIONS   Resume prior medications. Do NOT start any anticoagulation medication without prior   approval from your doctor    Follow instructions for use printed on the prescription.    All narcotics cause drowsiness. Do NOT drive, drink alcohol, or operate heavy machinery.    Increasing fiber and fluid intake can ease constipation. Stool softeners such as Colace can also   be taken if needed for constipation.    If the medications cause a reaction such as nausea or a rash, STOP the medication and call your   doctor.     WHEN TO CALL THE DOCTOR   Temperature > 101 degrees F or severe chills  Persistent nausea/vomiting   Pain not relieved with oral medication  Excessive bleeding or inability to urinate    after several hours   Call 911 if you have shortness of breath  Any questions/concerns    FOLLOW-UP   You will return to see your physician in 4-6 weeks time with an x-ray prior to assess for stone   clearance.   If you have a stent in place you will follow up within 1-2 weeks for cystoscopy and stent removal in the office.

## 2024-07-22 NOTE — OP NOTE
OPERATIVE REPORT     Patient Name:  Eugenie Rapp  YOB: 1970    Patient MRN: [id number]    Date of Surgery:  7/22/24     Indications:  52 yo female with a history of left non-obstructing renal stone.  Patient desires operative intervention.  We have discussed the risks and benefits of ESWL.  Patient elects to proceed.    Pre-op Diagnosis:   Left Renal Stone    Post-op Diagnosis:   Left Renal Stone    Procedures Performed:  LEFT EXTRACORPORAL SHOCKWAVE LITHOTRIPSY    Staff:  Familia Montes MD    Anesthesia: General    Estimated Blood Loss: None    Complications: None immediate      Findings:   1.   fluoroscopy identifying 4mm nonobstructing left renal stone.  2.  Successful shockwave lithotripsy of stone.  Conclusion KUB with appropriate demonstration of stone fragmentation.    Description of Procedure:   After informed consent, the patient was brought back to the operating suite and moved over to the operating table. IV antibiotics were administered, and the patient was placed in the supine position with careful attention focused on padding all pressure points. The patient was prepped and draped in standard fashion. A timeout was performed to ensure the correct patient and procedure. SCD were placed on the bilateral lower extremities.       fluoroscopy fluoroscopy was used to confirm presence and location of 4mm non-obstructing left renal stone identified. General anesthesia was then induced without any complication.       The stone was then targeted and shocks were delivered at 60-90 shocks per minute.  A 2-minute pause was performed after 200 shocks. The energy was slowly ramped up from 1-5 every 100 shocks.  A total of 2500 shocks were delivered.  The stone appeared fragment and had lost density at the end of the case.  The patient tolerated the procedure well and there were no immediate complications to the procedure.  The patient was taken to PACU in stable and extubated condition.          Disposition/Follow Up:   The patient will be discharged home once PACU criteria is met.  The patient will follow-up in 4 weeks with renal ultrasound. The intraoperative findings and postoperative plans were discussed with the patient and family.    Familia Montes MD     Date: 7/22/2024  Time: 16:04 EDT

## 2024-07-22 NOTE — INTERVAL H&P NOTE
[facility] Pre-op    Full history and physical note from office is attached.    /82 (BP Location: Right arm, Patient Position: Sitting)   Pulse 64   Temp 98.3 °F (36.8 °C) (Temporal)   Resp 18   LMP  (LMP Unknown)   SpO2 99%     Immunizations:  Influenza:  UTD  Pneumococcal:  UTD  Tetanus:  UTD    Review of Systems:  Constitutional-- No fever, chills or sweats. No fatigue.  CV-- No chest pain, palpitation or syncope  Resp-- No SOB, cough, hemoptysis  Skin--No rashes or lesions    Physical Exam:  Heart:   Regular rate and rhythm, S1 and S2 normal  Lungs: Clear to auscultation bilaterally, respirations unlabored    LAB Results:  Lab Results   Component Value Date    WBC 5.76 07/17/2024    HGB 13.9 07/17/2024    HCT 41.1 07/17/2024    MCV 90.5 07/17/2024     07/17/2024    NEUTROABS 6.10 06/21/2024    GLUCOSE 83 06/21/2024    BUN 21 (H) 06/21/2024    CREATININE 1.15 (H) 06/21/2024    EGFRIFNONA 60 (L) 03/04/2021    EGFRIFAFRI 73 03/04/2021     06/21/2024    K 4.0 06/21/2024     06/21/2024    CO2 23.0 06/21/2024    CALCIUM 9.3 06/21/2024    ALBUMIN 4.2 06/21/2024    AST 19 06/21/2024    ALT 11 06/21/2024    BILITOT 0.3 06/21/2024       Study Result    Narrative & Impression   CT ABDOMEN PELVIS STONE PROTOCOL     Date of Exam: 7/6/2024 8:52 AM EDT     Indication: History of 5mm left UVJ calclulus and left hydronephrsosis, confirm passage of ureteral calculus.     Comparison: CT of the abdomen and pelvis dated 6/21/2024     Technique: Axial CT images were obtained of the abdomen and pelvis without the administration of contrast. Reconstructed coronal and sagittal images were also obtained. Automated exposure control and iterative construction methods were used.        Findings:     Liver: The liver is unremarkable in morphology. Evaluation for focal liver lesions is limited without contrast. No biliary dilation is seen.     Gallbladder: Surgically absent.     Pancreas:  Unremarkable.     Spleen: Unremarkable.     Adrenal glands: Unremarkable.     Genitourinary tract: 4 mm nonobstructing calculus within the left kidney. Kidneys are otherwise unremarkable. No hydronephrosis is seen. The visualized portions of the ureters are unremarkable. Urinary bladder is unremarkable. Pelvic organs are not well   evaluated due to lack of IV contrast.     Gastrointestinal tract: Limited evaluation of the hollow viscera due to lack of IV contrast administration. No findings to suggest bowel obstruction.     Appendix: No findings to suggest acute appendicitis.     Other findings: No free air or free fluid. No pathologically enlarged lymph nodes are seen. The abdominal aorta is unremarkable.     Bones and soft tissues: No acute or suspicious osseous or soft tissue lesion is identified.     Lung bases: The visualized lung bases are clear.     IMPRESSION:  Impression:  1.No acute abnormality identified within the abdomen or pelvis.  2.Left nonobstructive nephrolithiasis.  3.Previously noted left UVJ calculus is no longer seen. Left hydronephrosis has resolved since the prior study.     Cancer Staging (if applicable)  Cancer Patient: __ yes __no __unknown__N/A; If yes, clinical stage T:__ N:__M:__, stage group or __N/A      Impression: Renal stone       Plan: EXTRACORPOREAL SHOCKWAVE LITHOTRIPSY LEFT       Silvana Licona, DEX   7/22/2024   13:41 EDT

## 2024-07-22 NOTE — ANESTHESIA PREPROCEDURE EVALUATION
Anesthesia Evaluation     history of anesthetic complications:  PONV               Airway   Mallampati: I  TM distance: >3 FB  Neck ROM: full  No difficulty expected  Dental      Pulmonary    (+) asthma,  Cardiovascular     ECG reviewed        Neuro/Psych  (+) CVA, headaches, numbness, psychiatric history  GI/Hepatic/Renal/Endo    (+) GERD, renal disease-    Musculoskeletal     Abdominal    Substance History      OB/GYN          Other   arthritis,                 Anesthesia Plan    ASA 2     general     intravenous induction     Anesthetic plan, risks, benefits, and alternatives have been provided, discussed and informed consent has been obtained with: patient.    Plan discussed with CRNA.    CODE STATUS:

## 2024-08-14 ENCOUNTER — HOSPITAL ENCOUNTER (OUTPATIENT)
Dept: ULTRASOUND IMAGING | Facility: HOSPITAL | Age: 54
Discharge: HOME | End: 2024-08-14
Admitting: UROLOGY
Payer: COMMERCIAL

## 2024-08-14 DIAGNOSIS — N20.0 NEPHROLITHIASIS: ICD-10-CM

## 2024-08-14 PROCEDURE — 76775 US EXAM ABDO BACK WALL LIM: CPT

## 2024-08-21 ENCOUNTER — OFFICE VISIT (OUTPATIENT)
Dept: UROLOGY | Facility: CLINIC | Age: 54
End: 2024-08-21
Payer: COMMERCIAL

## 2024-08-21 DIAGNOSIS — N20.0 NEPHROLITHIASIS: Primary | ICD-10-CM

## 2024-08-21 PROCEDURE — 99024 POSTOP FOLLOW-UP VISIT: CPT | Performed by: UROLOGY

## 2024-08-21 NOTE — PROGRESS NOTES
Office Note [facility]      Patient Name: Eugenie Rapp  : 1970   MRN: [id number]     Chief Complaint: History of Nephrolithiasis/Ureterolithiasis       History of Present Illness: Eugenie Rapp is a 53 y.o. female who presents today for week follow-up after ESWL for management of nephrolithiasis.  She is doing well in the postoperative setting, denies flank pain, current lower urinary tract symptoms.        Subjective      Review of System: Review of Systems   Genitourinary:  Negative for decreased urine volume, difficulty urinating, dysuria, enuresis, flank pain, frequency, hematuria and urgency.        I have reviewed the ROS documented by my clinical staff, updated as appropriate and I agree. 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    Partial blindness     LEFT EYE <1/4 VISUAL FIELD AFFECTED FROM MICROSTROKE TO RETINA    Plantar fasciitis     BILAT FEET    PMS (premenstrual syndrome)     PONV (postoperative nausea and vomiting)     VOMITS ALL DAY    Retinal thrombosis     Sciatica of left side     Seasonal allergies     Stroke 15 years ago    Micro stroke left retina  2008    Tennis elbow     LEFT    Urinary tract infection ~10 years ago    One time    Varicella Childhood    Vitamin D deficiency     Wears eyeglasses        Past Surgical History:   Past Surgical History:   Procedure Laterality Date    COLONOSCOPY      EXTRACORPOREAL SHOCK WAVE LITHOTRIPSY (ESWL) Left 2024    Procedure: EXTRACORPOREAL SHOCKWAVE LITHOTRIPSY LEFT;  Surgeon: Jyoti  MD Familia;  Location: [facility] OR;  Service: Urology;  Laterality: Left;    KNEE CARTILAGE SURGERY Right     LAPAROSCOPIC CHOLECYSTECTOMY  ~ 14 years    LASIK      POSTERIOR VAGINAL REPAIR      TENNIS ELBOW RELEASE Left 10/03/2017    Procedure: LEFT ELBOW OPEN EXTENSOR TENDON DEBRIDEMENT AND REPAIR;  Surgeon: TASH Aly MD;  Location:  [facility] OR [facility];  Service:     ULNAR NERVE DECOMPRESSION Left     WISDOM TOOTH EXTRACTION         Family History:   Family History   Problem Relation Age of Onset    Leukemia Brother     Alcohol abuse Brother     Cancer Brother         Leukemia    Hypertension Mother     Stroke Father     Hypertension Father     Vision loss Brother         Genetic    Malig Hyperthermia Neg Hx     Breast cancer Neg Hx     Ovarian cancer Neg Hx     Colon cancer Neg Hx     Deep vein thrombosis Neg Hx     Pulmonary embolism Neg Hx        Social History:   Social History     Socioeconomic History    Marital status:    Tobacco Use    Smoking status: Never     Passive exposure: Never    Smokeless tobacco: Never   Vaping Use    Vaping status: Never Used   Substance and Sexual Activity    Alcohol use: Yes     Comment: occasional social    Drug use: Never    Sexual activity: Defer     Partners: Male     Birth control/protection: Post-menopausal, Surgical     Comment: vas       Medications:     Current Outpatient Medications:     albuterol sulfate  (90 Base) MCG/ACT inhaler, Inhale 2 puffs Every 6 (Six) Hours As Needed for Wheezing., Disp: 18 g, Rfl: 1    BLACK COHOSH PO, Take 1 dose by mouth Daily., Disp: , Rfl:     buPROPion XL (WELLBUTRIN XL) 300 MG 24 hr tablet, Take 1 tablet by mouth Every Morning., Disp: 90 tablet, Rfl: 4    busPIRone (BUSPAR) 15 MG tablet, Take 1 tablet by mouth 2 (Two) Times a Day., Disp: 180 tablet, Rfl: 1    CAFFEINE PO, Take  by mouth., Disp: , Rfl:     cetirizine (ZyrTEC) 10 MG tablet, Take 1 tablet by mouth Every Night., Disp: , Rfl:     Cholecalciferol (VITAMIN D-3  PO), Take 4,000 Units by mouth Daily., Disp: , Rfl:     famotidine (PEPCID) 40 MG tablet, TAKE ONE TABLET BY MOUTH EVERY NIGHT AT BEDTIME, Disp: 90 tablet, Rfl: 4    Fluticasone-Salmeterol (ADVAIR/WIXELA) 250-50 MCG/ACT DISKUS, Inhale 1 puff 2 (Two) Times a Day., Disp: 60 each, Rfl: 0    gabapentin (NEURONTIN) 600 MG tablet, Take 1 tablet by mouth 3 (Three) Times a Day., Disp: 270 tablet, Rfl: 1    glucosamine-chondroitin 500-400 MG capsule capsule, Take  by mouth 3 (Three) Times a Day With Meals., Disp: , Rfl:     lidocaine (LIDODERM) 5 %, APPLY 1 PATCH TO AFFECTED AREA FOR 12 HOURS IN A 24 HOUR PERIOD, Disp: 30 patch, Rfl: 6    melatonin 5 MG tablet tablet, melatonin 5 mg tablet  Take by oral route., Disp: , Rfl:     meloxicam (MOBIC) 15 MG tablet, TAKE 1 TABLET BY MOUTH DAILY AS NEEDED FOR MODERATE PAIN, Disp: 90 tablet, Rfl: 1    methocarbamol (ROBAXIN) 500 MG tablet, Take 1 tablet by mouth 3 (Three) Times a Day., Disp: 15 tablet, Rfl: 0    traMADol (ULTRAM) 50 MG tablet, Every 12 (Twelve) Hours., Disp: , Rfl:     traZODone (DESYREL) 50 MG tablet, TAKE ONE TABLET BY MOUTH ONCE NIGHTLY, Disp: 90 tablet, Rfl: 1    flunisolide (NASALIDE) 25 MCG/ACT (0.025%) solution nasal spray, Inhale 1 spray Every 12 (Twelve) Hours. (Patient not taking: Reported on 7/22/2024), Disp: 25 mL, Rfl: 0    HYDROcodone-acetaminophen (NORCO) 5-325 MG per tablet, Take 1 tablet by mouth Every 6 (Six) Hours As Needed for Moderate Pain. (Patient not taking: Reported on 7/22/2024), Disp: 12 tablet, Rfl: 0    ondansetron (ZOFRAN) 4 MG tablet, Take 1 tablet by mouth Every 8 (Eight) Hours As Needed for Nausea. (Patient not taking: Reported on 7/22/2024), Disp: 15 tablet, Rfl: 0    oxybutynin XL (DITROPAN-XL) 5 MG 24 hr tablet, Take 1 tablet by mouth Daily. PRN BLADDER SPASM (Patient not taking: Reported on 8/21/2024), Disp: 14 tablet, Rfl: 0    pantoprazole (PROTONIX) 40 MG EC tablet, TAKE ONE TABLET BY MOUTH DAILY (Patient not taking: Reported  on 8/21/2024), Disp: 90 tablet, Rfl: 4    phenazopyridine (Pyridium) 200 MG tablet, Take 1 tablet by mouth 3 (Three) Times a Day As Needed for Bladder Spasms. (Patient not taking: Reported on 8/21/2024), Disp: 20 tablet, Rfl: 0    tamsulosin (FLOMAX) 0.4 MG capsule 24 hr capsule, Take 1 capsule by mouth Daily. (Patient not taking: Reported on 8/21/2024), Disp: 10 capsule, Rfl: 0    Allergies:   Allergies   Allergen Reactions    Dilaudid [Hydromorphone] Nausea And Vomiting    Promethazine Other (See Comments)     EPS SYMPTOMS    Codeine Rash    Penicillins Rash    Sulfa Antibiotics Rash       Objective     Physical Exam:   Vital Signs: There were no vitals filed for this visit.  There is no height or weight on file to calculate BMI.     Physical Exam  Vitals and nursing note reviewed.   Constitutional:       Appearance: Normal appearance.   HENT:      Head: Normocephalic and atraumatic.   Cardiovascular:      Comments: Well perfused  Pulmonary:      Effort: Pulmonary effort is normal.   Abdominal:      General: Abdomen is flat.      Palpations: Abdomen is soft.   Musculoskeletal:         General: Normal range of motion.   Skin:     General: Skin is warm and dry.   Neurological:      General: No focal deficit present.      Mental Status: She is alert and oriented to person, place, and time. Mental status is at baseline.   Psychiatric:         Mood and Affect: Mood normal.         Behavior: Behavior normal.         Thought Content: Thought content normal.         Judgment: Judgment normal.         Labs:   Brief Urine Lab Results  (Last result in the past 365 days)        Color   Clarity   Blood   Leuk Est   Nitrite   Protein   CREAT   Urine HCG        06/21/24 2305 Yellow   Clear   Moderate (2+)   Small (1+)   Negative   30 mg/dL (1+)                   Urine Culture          6/21/2024    23:05   Urine Culture   Urine Culture No growth         Lab Results   Component Value Date    GLUCOSE 83 06/21/2024    CALCIUM 9.3  06/21/2024     06/21/2024    K 4.0 06/21/2024    CO2 23.0 06/21/2024     06/21/2024    BUN 21 (H) 06/21/2024    CREATININE 1.15 (H) 06/21/2024    EGFRIFAFRI 73 03/04/2021    EGFRIFNONA 60 (L) 03/04/2021    BCR 18.3 06/21/2024    ANIONGAP 10.0 06/21/2024       Lab Results   Component Value Date    WBC 5.76 07/17/2024    HGB 13.9 07/17/2024    HCT 41.1 07/17/2024    MCV 90.5 07/17/2024     07/17/2024         Images:   US Renal Bilateral    Result Date: 8/14/2024  Impression: Simple appearing right renal cyst No evidence of hydronephrosis A 5 mm bladder calculi at or adjacent to the left ureterovesical junction Electronically Signed: Armando Skelton MD  8/14/2024 5:29 PM EDT  Workstation ID: [id number]    CT Abdomen Pelvis Stone Protocol    Result Date: 7/8/2024  Impression: 1.No acute abnormality identified within the abdomen or pelvis. 2.Left nonobstructive nephrolithiasis. 3.Previously noted left UVJ calculus is no longer seen. Left hydronephrosis has resolved since the prior study. Electronically Signed: John Paul Stevenson MD  7/8/2024 9:54 AM EDT  Workstation ID: [id number]    CT Abdomen Pelvis Without Contrast    Result Date: 6/21/2024  Impression: 0.5 cm obstructing stone within the left ureterovesicular junction causing mild left-sided hydronephrosis and hydroureter. Additional 0.3 cm nonobstructing stone in the left mid kidney. No obstructing stones or hydronephrosis on the right Electronically Signed: Mak Spear DO  6/21/2024 10:04 PM EDT  Workstation ID: [id number]      Measures:   Tobacco:   Eugenie Rapp  reports that she has never smoked. She has never been exposed to tobacco smoke. She has never used smokeless tobacco. I have educated her on the risk of diseases from using tobacco products.     Assessment / Plan      Assessment/Plan:   Eugenie Rapp is a 53 y.o. female who presented today with nephrolithiasis/ureterolithiasis.  Presents today for 4-week follow-up with ultrasound after  completion of ESWL for management of obstructing renal stone.  She is doing well in the postoperative setting.  Ultrasound with no recurrent evidence of nephrolithiasis.  No evidence of hydronephrosis.  We have discussed conservative strategies to reduce stone risk including increasing fluid intake to greater than 2 to 2-1/2 L, decreasing sodium and animal protein intake, increasing citrate intake.  She will follow-up in 1 year with ultrasound, alert with any acute stone symptoms prior.    Diagnoses and all orders for this visit:    1. Nephrolithiasis (Primary)  -     US Renal Bilateral; Future         Follow Up:   Return in about 1 year (around 8/21/2025) for Recheck.    I spent approximately 20 minutes providing clinical care for this patient; including review of patient's chart and provider documentation, face to face time spent with patient in examination room (obtaining history, performing physical exam, discussing diagnosis and management options), placing orders, and completing patient documentation.     Familia Montes MD  [facility] [place] [facility] [place]

## 2024-10-09 ENCOUNTER — HOSPITAL ENCOUNTER (OUTPATIENT)
Dept: GENERAL RADIOLOGY | Facility: HOSPITAL | Age: 54
Discharge: HOME | End: 2024-10-09
Admitting: PHYSICIAN ASSISTANT
Payer: COMMERCIAL

## 2024-10-09 ENCOUNTER — TRANSCRIBE ORDERS (OUTPATIENT)
Dept: GENERAL RADIOLOGY | Facility: HOSPITAL | Age: 54
End: 2024-10-09
Payer: COMMERCIAL

## 2024-10-09 DIAGNOSIS — M17.0 BILATERAL PRIMARY OSTEOARTHRITIS OF KNEE: ICD-10-CM

## 2024-10-09 DIAGNOSIS — M17.0 BILATERAL PRIMARY OSTEOARTHRITIS OF KNEE: Primary | ICD-10-CM

## 2024-10-09 PROCEDURE — 73564 X-RAY EXAM KNEE 4 OR MORE: CPT

## 2024-12-06 RX ORDER — TRAZODONE HYDROCHLORIDE 50 MG/1
TABLET, FILM COATED ORAL
Qty: 90 TABLET | Refills: 1 | Status: SHIPPED | OUTPATIENT
Start: 2024-12-06

## 2024-12-06 NOTE — TELEPHONE ENCOUNTER
Rx Refill Note  Requested Prescriptions     Pending Prescriptions Disp Refills    traZODone (DESYREL) 50 MG tablet [Pharmacy Med Name: traZODone 50 MG TABLET] 90 tablet 1     Sig: TAKE ONE TABLET BY MOUTH ONCE NIGHTLY      Last office visit with prescribing clinician: 6/19/2024   Last telemedicine visit with prescribing clinician: Visit date not found   Next office visit with prescribing clinician: 6/24/2025                         Would you like a call back once the refill request has been completed: [] Yes [] No    If the office needs to give you a call back, can they leave a voicemail: [] Yes [] No    Hal Jeffrey MA  12/06/24, 08:31 EST

## 2024-12-23 DIAGNOSIS — M79.7 FIBROMYALGIA: ICD-10-CM

## 2024-12-24 RX ORDER — GABAPENTIN 600 MG/1
600 TABLET ORAL 3 TIMES DAILY
Qty: 90 TABLET | Refills: 4 | Status: SHIPPED | OUTPATIENT
Start: 2024-12-24

## 2025-01-08 RX ORDER — MELOXICAM 15 MG/1
15 TABLET ORAL DAILY PRN
Qty: 90 TABLET | Refills: 1 | Status: SHIPPED | OUTPATIENT
Start: 2025-01-08

## 2025-01-08 NOTE — TELEPHONE ENCOUNTER
Rx Refill Note  Requested Prescriptions     Pending Prescriptions Disp Refills    meloxicam (MOBIC) 15 MG tablet [Pharmacy Med Name: MELOXICAM 15 MG TABLET] 90 tablet 1     Sig: TAKE 1 TABLET BY MOUTH DAILY AS NEEDED      Last office visit with prescribing clinician: 6/19/2024   Last telemedicine visit with prescribing clinician: Visit date not found   Next office visit with prescribing clinician: 6/24/2025                         Would you like a call back once the refill request has been completed: [] Yes [] No    If the office needs to give you a call back, can they leave a voicemail: [] Yes [] No    Javan Winslow MA  01/08/25, 13:07 EST

## 2025-01-31 ENCOUNTER — TRANSCRIBE ORDERS (OUTPATIENT)
Dept: ADMINISTRATIVE | Facility: HOSPITAL | Age: 55
End: 2025-01-31
Payer: COMMERCIAL

## 2025-01-31 DIAGNOSIS — R22.41 KNEE MASS, RIGHT: Primary | ICD-10-CM

## 2025-01-31 RX ORDER — BUSPIRONE HYDROCHLORIDE 15 MG/1
15 TABLET ORAL 2 TIMES DAILY
Qty: 180 TABLET | Refills: 1 | Status: SHIPPED | OUTPATIENT
Start: 2025-01-31

## 2025-01-31 NOTE — TELEPHONE ENCOUNTER
Rx Refill Note  Requested Prescriptions     Pending Prescriptions Disp Refills    busPIRone (BUSPAR) 15 MG tablet [Pharmacy Med Name: busPIRone HCL 15 MG TABLET] 180 tablet 1     Sig: TAKE 1 TABLET BY MOUTH 2 TIMES A DAY      Last office visit with prescribing clinician: 6/19/2024   Last telemedicine visit with prescribing clinician: Visit date not found   Next office visit with prescribing clinician: 6/24/2025                         Would you like a call back once the refill request has been completed: [] Yes [] No    If the office needs to give you a call back, can they leave a voicemail: [] Yes [] No    Hal Jeffrey MA  01/31/25, 08:29 EST

## 2025-02-14 ENCOUNTER — HOSPITAL ENCOUNTER (OUTPATIENT)
Facility: HOSPITAL | Age: 55
Discharge: HOME | End: 2025-02-14
Admitting: ORTHOPAEDIC SURGERY
Payer: COMMERCIAL

## 2025-02-14 DIAGNOSIS — R22.41 KNEE MASS, RIGHT: ICD-10-CM

## 2025-02-14 PROCEDURE — 73721 MRI JNT OF LWR EXTRE W/O DYE: CPT

## 2025-04-15 ENCOUNTER — TRANSCRIBE ORDERS (OUTPATIENT)
Dept: LAB | Facility: HOSPITAL | Age: 55
End: 2025-04-15
Payer: COMMERCIAL

## 2025-04-15 ENCOUNTER — LAB (OUTPATIENT)
Dept: LAB | Facility: HOSPITAL | Age: 55
End: 2025-04-15
Payer: COMMERCIAL

## 2025-04-15 ENCOUNTER — TRANSCRIBE ORDERS (OUTPATIENT)
Dept: CARDIOLOGY | Facility: HOSPITAL | Age: 55
End: 2025-04-15
Payer: COMMERCIAL

## 2025-04-15 ENCOUNTER — HOSPITAL ENCOUNTER (OUTPATIENT)
Dept: CARDIOLOGY | Facility: HOSPITAL | Age: 55
Discharge: HOME | End: 2025-04-15
Payer: COMMERCIAL

## 2025-04-15 DIAGNOSIS — Z01.818 OTHER SPECIFIED PRE-OPERATIVE EXAMINATION: Primary | ICD-10-CM

## 2025-04-15 DIAGNOSIS — Z01.818 OTHER SPECIFIED PRE-OPERATIVE EXAMINATION: ICD-10-CM

## 2025-04-15 DIAGNOSIS — Z87.898 PERSONAL HISTORY OF UNSPECIFIED DISEASE: ICD-10-CM

## 2025-04-15 DIAGNOSIS — Z87.898 PERSONAL HISTORY OF UNSPECIFIED DISEASE: Primary | ICD-10-CM

## 2025-04-15 DIAGNOSIS — R73.09 IMPAIRED GLUCOSE TOLERANCE TEST: ICD-10-CM

## 2025-04-15 LAB
ANION GAP SERPL CALCULATED.3IONS-SCNC: 10 MMOL/L (ref 5–15)
BUN SERPL-MCNC: 20 MG/DL (ref 6–20)
BUN/CREAT SERPL: 16.9 (ref 7–25)
CALCIUM SPEC-SCNC: 9 MG/DL (ref 8.6–10.5)
CHLORIDE SERPL-SCNC: 105 MMOL/L (ref 98–107)
CO2 SERPL-SCNC: 25 MMOL/L (ref 22–29)
CREAT SERPL-MCNC: 1.18 MG/DL (ref 0.57–1)
DEPRECATED RDW RBC AUTO: 40.2 FL (ref 37–54)
EGFRCR SERPLBLD CKD-EPI 2021: 54.7 ML/MIN/1.73
ERYTHROCYTE [DISTWIDTH] IN BLOOD BY AUTOMATED COUNT: 12.5 % (ref 12.3–15.4)
GLUCOSE SERPL-MCNC: 97 MG/DL (ref 65–99)
HBA1C MFR BLD: 5 % (ref 4.8–5.6)
HCT VFR BLD AUTO: 37.9 % (ref 34–46.6)
HGB BLD-MCNC: 13.4 G/DL (ref 12–15.9)
MCH RBC QN AUTO: 31.2 PG (ref 26.6–33)
MCHC RBC AUTO-ENTMCNC: 35.4 G/DL (ref 31.5–35.7)
MCV RBC AUTO: 88.1 FL (ref 79–97)
PLATELET # BLD AUTO: 303 10*3/MM3 (ref 140–450)
PMV BLD AUTO: 9.6 FL (ref 6–12)
POTASSIUM SERPL-SCNC: 4.1 MMOL/L (ref 3.5–5.2)
QT INTERVAL: 370 MS
QTC INTERVAL: 393 MS
RBC # BLD AUTO: 4.3 10*6/MM3 (ref 3.77–5.28)
SODIUM SERPL-SCNC: 140 MMOL/L (ref 136–145)
WBC NRBC COR # BLD AUTO: 6.4 10*3/MM3 (ref 3.4–10.8)

## 2025-04-15 PROCEDURE — 80048 BASIC METABOLIC PNL TOTAL CA: CPT

## 2025-04-15 PROCEDURE — 36415 COLL VENOUS BLD VENIPUNCTURE: CPT

## 2025-04-15 PROCEDURE — 93005 ELECTROCARDIOGRAM TRACING: CPT | Performed by: ORTHOPAEDIC SURGERY

## 2025-04-15 PROCEDURE — 85027 COMPLETE CBC AUTOMATED: CPT

## 2025-04-15 PROCEDURE — 83036 HEMOGLOBIN GLYCOSYLATED A1C: CPT

## 2025-05-25 DIAGNOSIS — M79.7 FIBROMYALGIA: ICD-10-CM

## 2025-05-27 RX ORDER — TRAZODONE HYDROCHLORIDE 50 MG/1
50 TABLET ORAL NIGHTLY
Qty: 90 TABLET | Refills: 1 | Status: SHIPPED | OUTPATIENT
Start: 2025-05-27

## 2025-05-27 RX ORDER — GABAPENTIN 600 MG/1
600 TABLET ORAL 3 TIMES DAILY
Qty: 90 TABLET | Refills: 0 | Status: SHIPPED | OUTPATIENT
Start: 2025-05-27

## 2025-05-27 NOTE — TELEPHONE ENCOUNTER
Rx Refill Note  Requested Prescriptions     Pending Prescriptions Disp Refills    traZODone (DESYREL) 50 MG tablet [Pharmacy Med Name: traZODone 50 MG TABLET] 90 tablet 1     Sig: TAKE ONE TABLET BY MOUTH ONCE NIGHTLY    gabapentin (NEURONTIN) 600 MG tablet [Pharmacy Med Name: GABAPENTIN 600 MG TABLET] 90 tablet 0     Sig: TAKE 1 TABLET BY MOUTH 3 TIMES A DAY      Last office visit with prescribing clinician: 6/19/2024   Last telemedicine visit with prescribing clinician: Visit date not found   Next office visit with prescribing clinician: 6/24/2025                         Would you like a call back once the refill request has been completed: [] Yes [] No    If the office needs to give you a call back, can they leave a voicemail: [] Yes [] No    Carroll Howard MA  05/27/25, 09:20 EDT

## 2025-06-17 ENCOUNTER — PATIENT MESSAGE (OUTPATIENT)
Dept: FAMILY MEDICINE CLINIC | Facility: CLINIC | Age: 55
End: 2025-06-17
Payer: COMMERCIAL

## 2025-06-17 DIAGNOSIS — Z13.29 SCREENING FOR THYROID DISORDER: ICD-10-CM

## 2025-06-17 DIAGNOSIS — Z13.220 SCREENING FOR LIPID DISORDERS: ICD-10-CM

## 2025-06-17 DIAGNOSIS — Z00.00 WELL ADULT EXAM: Primary | ICD-10-CM

## 2025-06-19 ENCOUNTER — LAB (OUTPATIENT)
Dept: LAB | Facility: HOSPITAL | Age: 55
End: 2025-06-19
Payer: COMMERCIAL

## 2025-06-19 DIAGNOSIS — Z00.00 WELL ADULT EXAM: ICD-10-CM

## 2025-06-19 DIAGNOSIS — Z13.29 SCREENING FOR THYROID DISORDER: ICD-10-CM

## 2025-06-19 DIAGNOSIS — Z13.220 SCREENING FOR LIPID DISORDERS: ICD-10-CM

## 2025-06-19 LAB
ALBUMIN SERPL-MCNC: 4.3 G/DL (ref 3.5–5.2)
ALBUMIN/GLOB SERPL: 1.5 G/DL
ALP SERPL-CCNC: 76 U/L (ref 39–117)
ALT SERPL W P-5'-P-CCNC: 10 U/L (ref 1–33)
ANION GAP SERPL CALCULATED.3IONS-SCNC: 10 MMOL/L (ref 5–15)
AST SERPL-CCNC: 19 U/L (ref 1–32)
BACTERIA UR QL AUTO: NORMAL /HPF
BASOPHILS # BLD AUTO: 0.06 10*3/MM3 (ref 0–0.2)
BASOPHILS NFR BLD AUTO: 1 % (ref 0–1.5)
BILIRUB SERPL-MCNC: 0.4 MG/DL (ref 0–1.2)
BILIRUB UR QL STRIP: NEGATIVE
BUN SERPL-MCNC: 18 MG/DL (ref 6–20)
BUN/CREAT SERPL: 15.8 (ref 7–25)
CALCIUM SPEC-SCNC: 9.4 MG/DL (ref 8.6–10.5)
CHLORIDE SERPL-SCNC: 103 MMOL/L (ref 98–107)
CHOLEST SERPL-MCNC: 220 MG/DL (ref 0–200)
CLARITY UR: CLEAR
CO2 SERPL-SCNC: 26 MMOL/L (ref 22–29)
COLOR UR: YELLOW
CREAT SERPL-MCNC: 1.14 MG/DL (ref 0.57–1)
DEPRECATED RDW RBC AUTO: 42.2 FL (ref 37–54)
EGFRCR SERPLBLD CKD-EPI 2021: 57 ML/MIN/1.73
EOSINOPHIL # BLD AUTO: 0.3 10*3/MM3 (ref 0–0.4)
EOSINOPHIL NFR BLD AUTO: 5.1 % (ref 0.3–6.2)
ERYTHROCYTE [DISTWIDTH] IN BLOOD BY AUTOMATED COUNT: 12.8 % (ref 12.3–15.4)
GLOBULIN UR ELPH-MCNC: 2.9 GM/DL
GLUCOSE SERPL-MCNC: 87 MG/DL (ref 65–99)
GLUCOSE UR STRIP-MCNC: NEGATIVE MG/DL
HCT VFR BLD AUTO: 40.2 % (ref 34–46.6)
HDLC SERPL-MCNC: 67 MG/DL (ref 40–60)
HGB BLD-MCNC: 13.8 G/DL (ref 12–15.9)
HGB UR QL STRIP.AUTO: NEGATIVE
HOLD SPECIMEN: NORMAL
HYALINE CASTS UR QL AUTO: NORMAL /LPF
IMM GRANULOCYTES # BLD AUTO: 0.02 10*3/MM3 (ref 0–0.05)
IMM GRANULOCYTES NFR BLD AUTO: 0.3 % (ref 0–0.5)
KETONES UR QL STRIP: NEGATIVE
LDLC SERPL CALC-MCNC: 141 MG/DL (ref 0–100)
LDLC/HDLC SERPL: 2.07 {RATIO}
LEUKOCYTE ESTERASE UR QL STRIP.AUTO: ABNORMAL
LYMPHOCYTES # BLD AUTO: 2.32 10*3/MM3 (ref 0.7–3.1)
LYMPHOCYTES NFR BLD AUTO: 39.5 % (ref 19.6–45.3)
MCH RBC QN AUTO: 30.9 PG (ref 26.6–33)
MCHC RBC AUTO-ENTMCNC: 34.3 G/DL (ref 31.5–35.7)
MCV RBC AUTO: 90.1 FL (ref 79–97)
MONOCYTES # BLD AUTO: 0.47 10*3/MM3 (ref 0.1–0.9)
MONOCYTES NFR BLD AUTO: 8 % (ref 5–12)
NEUTROPHILS NFR BLD AUTO: 2.71 10*3/MM3 (ref 1.7–7)
NEUTROPHILS NFR BLD AUTO: 46.1 % (ref 42.7–76)
NITRITE UR QL STRIP: NEGATIVE
NRBC BLD AUTO-RTO: 0 /100 WBC (ref 0–0.2)
PH UR STRIP.AUTO: 6 [PH] (ref 5–8)
PLATELET # BLD AUTO: 307 10*3/MM3 (ref 140–450)
PMV BLD AUTO: 9.4 FL (ref 6–12)
POTASSIUM SERPL-SCNC: 4.4 MMOL/L (ref 3.5–5.2)
PROT SERPL-MCNC: 7.2 G/DL (ref 6–8.5)
PROT UR QL STRIP: NEGATIVE
RBC # BLD AUTO: 4.46 10*6/MM3 (ref 3.77–5.28)
RBC # UR STRIP: NORMAL /HPF
REF LAB TEST METHOD: NORMAL
SODIUM SERPL-SCNC: 139 MMOL/L (ref 136–145)
SP GR UR STRIP: 1.01 (ref 1–1.03)
SQUAMOUS #/AREA URNS HPF: NORMAL /HPF
T4 FREE SERPL-MCNC: 1.04 NG/DL (ref 0.92–1.68)
TRIGL SERPL-MCNC: 71 MG/DL (ref 0–150)
TSH SERPL DL<=0.05 MIU/L-ACNC: 3.39 UIU/ML (ref 0.27–4.2)
UROBILINOGEN UR QL STRIP: ABNORMAL
VLDLC SERPL-MCNC: 12 MG/DL (ref 5–40)
WBC # UR STRIP: NORMAL /HPF
WBC NRBC COR # BLD AUTO: 5.88 10*3/MM3 (ref 3.4–10.8)

## 2025-06-19 PROCEDURE — 80050 GENERAL HEALTH PANEL: CPT

## 2025-06-19 PROCEDURE — 84439 ASSAY OF FREE THYROXINE: CPT

## 2025-06-19 PROCEDURE — 36415 COLL VENOUS BLD VENIPUNCTURE: CPT

## 2025-06-19 PROCEDURE — 81001 URINALYSIS AUTO W/SCOPE: CPT

## 2025-06-19 PROCEDURE — 80061 LIPID PANEL: CPT

## 2025-06-24 ENCOUNTER — OFFICE VISIT (OUTPATIENT)
Dept: FAMILY MEDICINE CLINIC | Facility: CLINIC | Age: 55
End: 2025-06-24
Payer: COMMERCIAL

## 2025-06-24 VITALS
HEART RATE: 66 BPM | OXYGEN SATURATION: 98 % | BODY MASS INDEX: 30.93 KG/M2 | HEIGHT: 69 IN | WEIGHT: 208.8 LBS | SYSTOLIC BLOOD PRESSURE: 128 MMHG | DIASTOLIC BLOOD PRESSURE: 76 MMHG

## 2025-06-24 DIAGNOSIS — K21.9 GASTROESOPHAGEAL REFLUX DISEASE, UNSPECIFIED WHETHER ESOPHAGITIS PRESENT: ICD-10-CM

## 2025-06-24 DIAGNOSIS — F41.9 ANXIETY: Primary | ICD-10-CM

## 2025-06-24 DIAGNOSIS — Z00.00 WELL ADULT EXAM: ICD-10-CM

## 2025-06-24 DIAGNOSIS — M79.7 FIBROMYALGIA: ICD-10-CM

## 2025-06-24 RX ORDER — GABAPENTIN 600 MG/1
TABLET ORAL
Qty: 360 TABLET | Refills: 0 | Status: SHIPPED | OUTPATIENT
Start: 2025-06-24

## 2025-06-24 RX ORDER — MELOXICAM 7.5 MG/1
7.5 TABLET ORAL DAILY PRN
Qty: 90 TABLET | Refills: 0 | Status: SHIPPED | OUTPATIENT
Start: 2025-06-24

## 2025-06-24 RX ORDER — BUSPIRONE HYDROCHLORIDE 15 MG/1
15 TABLET ORAL 2 TIMES DAILY
Qty: 180 TABLET | Refills: 1 | Status: SHIPPED | OUTPATIENT
Start: 2025-06-24

## 2025-06-24 RX ORDER — BUPROPION HYDROCHLORIDE 300 MG/1
300 TABLET ORAL EVERY MORNING
Qty: 90 TABLET | Refills: 4 | Status: SHIPPED | OUTPATIENT
Start: 2025-06-24

## 2025-06-24 NOTE — PROGRESS NOTES
Chief Complaint  Annual Exam    Patient or patient representative verbalized consent for the use of Ambient Listening during the visit with  Yudelka Hart MD for chart documentation. 6/24/2025  13:05 EDT    Subjective        Eugenie Rapp presents to [facility]    History of Present Illness  The patient is here for her physical examination. She has a past medical history of GERD, anxiety, fibromyalgia, osteoarthritis, arthralgias, tendinitis, migraine headaches, and insomnia. She currently takes Wellbutrin  mg daily for depression, BuSpar 15 mg twice daily for anxiety, a vitamin D supplement, famotidine 40 mg daily for GERD, cetirizine 10 mg nightly for insomnia, and gabapentin 600 mg three times daily for fibromyalgia pain and radicular pain. She sees pain management, who prescribes her pain medication, and she sees Dr. Gonsalo Medellin in [place]. Additionally, she takes melatonin at night, uses trazodone 50 mg at night, meloxicam 15 mg as needed which is daily, and tramadol 50 mg three times a day along with her gabapentin 600 mg tid. She also uses Lidoderm patches.    She experienced kidney stones the day after her last physical examination, which were severe enough to necessitate hospitalization. The stones were obstructing her ureter but eventually moved. A subsequent stone of similar size required lithotripsy the following month. Since then, she has increased her water intake and reduced her tea consumption. She has abstained from soda for nearly 4 years.    She underwent lipoma removal in 04/2025 from her right medial knee    She is due for steroid injections in her left knee and Achilles tendon, which were initially scheduled for the same day. However, due to her upcoming cruise on 07/15/2025, she needs to reschedule them. Her PA at the pain clinic advised a minimum of 2 weeks between the injections.    She struggles with sleep, often staying awake until midnight or 1 AM.  She takes her bedtime medications earlier in the evening, including trazodone and melatonin, but still does not feel sleepy until after midnight. During the school year, she wakes up at 7 AM, but in the summer, she sleeps until she naturally wakes up, usually between 8 and 9 AM.    She is overdue for a mammogram and plans to schedule an appointment with a new gynecologist. She visits her eye doctor annually and wears glasses. She received both tetanus and shingles vaccines in 2020, experiencing a reaction to the first shingles vaccine.    She has been constipated since childhood, leading to stretching of the colon and rectum. She has been managing it more effectively recently, going to the bathroom every couple of days instead of once a week. She takes Colace daily, along with two fiber gummies and MiraLAX gummies. She drinks a lot of water with these supplements and occasionally eats bran cereal. She feels her condition is improving as she requires less assistance than before. She believes her slow gut contributes to her GERD. She took Propulsid before it was taken off the market, which helped her     She has been taking meloxicam 15 mg  daily for pain management but is unsure of its effectiveness. She ran out of gabapentin over the Memorial Day weekend and realized how much pain it had been managing. She also takes tramadol as needed two-three times a day but typically only takes it once in the morning and evening. She had a procedure on her back to alleviate nerve pain, which was successful in reducing her back pain.    PAST SURGICAL HISTORY:  Lipoma removal in 04/2025  Lithotripsy in 06/2024    FAMILY HISTORY  She reports no family history of colon cancers or rectal cancer.    The ASCVD Risk score (Agueda SHRESTHA, et al., 2019) failed to calculate for the following reasons:    Risk score cannot be calculated because patient has a medical history suggesting prior/existing ASCVD    TSH+Free T4 (06/19/2025 09:14)  Lipid  "Panel With LDL / HDL Ratio (06/19/2025 09:14)  Comprehensive Metabolic Panel (06/19/2025 09:14)  CBC & Differential (06/19/2025 09:14)  Urinalysis With Culture If Indicated - Urine, Clean Catch (06/19/2025 09:14)  [facility] Urine Culture Tube - Urine, Clean Catch (06/19/2025 09:14)  Urinalysis, Microscopic Only - Urine, Clean Catch (06/19/2025 09:14)  Objective   Vital Signs:  /76 (BP Location: Left arm, Patient Position: Sitting, Cuff Size: Adult)   Pulse 66   Ht 175.3 cm (69.02\")   Wt 94.7 kg (208 lb 12.8 oz)   SpO2 98%   BMI 30.82 kg/m²   Estimated body mass index is 30.82 kg/m² as calculated from the following:    Height as of this encounter: 175.3 cm (69.02\").    Weight as of this encounter: 94.7 kg (208 lb 12.8 oz).    BMI is >= 30 and <35. (Class 1 Obesity). The following options were offered after discussion;: weight loss educational material (shared in after visit summary) and exercise counseling/recommendations      Physical Exam  Vitals and nursing note reviewed.   Constitutional:       General: She is not in acute distress.     Appearance: Normal appearance. She is well-developed.   HENT:      Head: Normocephalic and atraumatic.      Right Ear: Tympanic membrane, ear canal and external ear normal.      Left Ear: Tympanic membrane, ear canal and external ear normal.      Mouth/Throat:      Mouth: Mucous membranes are moist.      Pharynx: Oropharynx is clear.   Eyes:      Extraocular Movements: Extraocular movements intact.      Conjunctiva/sclera: Conjunctivae normal.   Neck:      Vascular: No carotid bruit.   Cardiovascular:      Rate and Rhythm: Normal rate and regular rhythm.      Heart sounds: Normal heart sounds.      Comments: No bruits  Pulmonary:      Effort: Pulmonary effort is normal. No respiratory distress.      Breath sounds: Normal breath sounds. No stridor. No wheezing, rhonchi or rales.   Chest:      Chest wall: No tenderness.   Abdominal:      General: Bowel sounds are normal. " There is no distension.      Palpations: Abdomen is soft. There is no mass.      Tenderness: There is no abdominal tenderness. There is no guarding or rebound.      Hernia: No hernia is present.   Musculoskeletal:      Cervical back: Neck supple. No tenderness.      Right lower leg: No edema.      Left lower leg: No edema.   Lymphadenopathy:      Cervical: No cervical adenopathy.   Skin:     General: Skin is warm.      Findings: No rash.   Neurological:      General: No focal deficit present.      Mental Status: She is alert and oriented to person, place, and time. Mental status is at baseline.   Psychiatric:         Mood and Affect: Mood normal.         Behavior: Behavior normal.         Thought Content: Thought content normal.         Judgment: Judgment normal.        Result Review :                   Assessment and Plan   Diagnoses and all orders for this visit:    1. Anxiety (Primary)  -     Comprehensive Metabolic Panel; Future  -     CBC & Differential; Future  -     Vitamin D,25-Hydroxy; Future  -     TSH; Future  -     T4, Free; Future  -     Lipid Panel With LDL / HDL Ratio; Future  -     UA / M With / Rflx Culture([facility] ONLY) - Urine, Clean Catch; Future    2. Fibromyalgia  -     gabapentin (NEURONTIN) 600 MG tablet; Take one tablet with breakfast, one tablet with lunch and 2 at bedtime  Dispense: 360 tablet; Refill: 0  -     Comprehensive Metabolic Panel; Future  -     CBC & Differential; Future  -     Vitamin D,25-Hydroxy; Future  -     TSH; Future  -     T4, Free; Future  -     Lipid Panel With LDL / HDL Ratio; Future  -     UA / M With / Rflx Culture([facility] ONLY) - Urine, Clean Catch; Future    3. Well adult exam  -     Comprehensive Metabolic Panel; Future  -     CBC & Differential; Future  -     Vitamin D,25-Hydroxy; Future  -     TSH; Future  -     T4, Free; Future  -     Lipid Panel With LDL / HDL Ratio; Future  -     UA / M With / Rflx Culture([facility] ONLY) - Urine, Clean Catch; Future    4.  Gastroesophageal reflux disease, unspecified whether esophagitis present  -     Comprehensive Metabolic Panel; Future  -     CBC & Differential; Future  -     Vitamin D,25-Hydroxy; Future  -     TSH; Future  -     T4, Free; Future  -     Lipid Panel With LDL / HDL Ratio; Future  -     UA / M With / Rflx Culture([facility] ONLY) - Urine, Clean Catch; Future    Other orders  -     meloxicam (MOBIC) 7.5 MG tablet; Take 1 tablet by mouth Daily As Needed for Moderate Pain.  Dispense: 90 tablet; Refill: 0  -     buPROPion XL (WELLBUTRIN XL) 300 MG 24 hr tablet; Take 1 tablet by mouth Every Morning.  Dispense: 90 tablet; Refill: 4  -     busPIRone (BUSPAR) 15 MG tablet; Take 1 tablet by mouth 2 (Two) Times a Day.  Dispense: 180 tablet; Refill: 1             Assessment & Plan  1. Elevated serum creatinine  - Creatinine levels are concerning, and GFR is 57, below the desired level of 60.  - Hydration status has improved since the last visit.  - Discussed reducing meloxicam dosage from 15 mg to 7.5 mg and changing from daily to as-needed use with using it no more than once every few days.    - Advised to monitor kidney function closely and maintain hydration.  Repeat BMP in 6-8 weeks.     2. Fibromyalgia.  - Currently on gabapentin 600 mg three times daily for fibromyalgia pain and radicular pain.  - Increasing gabapentin dosage to 2400 mg daily, divided into three doses: one tablet at breakfast, one at lunch, and two at bedtime.  - Discussed the effectiveness of gabapentin in managing pain.  - Advised to monitor for any side effects and report if pain management is insufficient.  - exercise would be very helpful and beneficial for her FM pain.    3. Insomnia.  - Reports difficulty sleeping and currently takes trazodone 50 mg and melatonin at night.  - Increasing gabapentin dosage may also help with sleep issues.  - Discussed the potential benefits of adjusting medication timing.  - Advised to monitor sleep patterns and report  any persistent issues.  Sleep hygiene discussed.  - - - -Consider seeing Dr. Teo Gonzales a sleep psychologist.     4. Anxiety.  - Currently taking BuSpar 15 mg twice daily for anxiety.  - Refill for BuSpar will be provided as she is almost due for it.  - Discussed the effectiveness of current anxiety management.  - Advised to continue current regimen and report any changes in symptoms.    5. Depression.  - Currently taking Wellbutrin  mg daily for depression.  - Refill for Wellbutrin will be provided as it has been a year since the last prescription.  - Discussed the effectiveness of current depression management.  - Advised to continue current regimen and report any changes in symptoms.    6. Gastroesophageal reflux disease (GERD).  - Taking famotidine 40 mg daily for GERD. Refilled today  - Discussed the effectiveness of current GERD management.  - Advised to continue current regimen and report any persistent symptoms.    7. Constipation.  - History of chronic constipation, currently managing with Colace, fiber gummies, and MiraLAX gummies daily.  - Advised to continue current regimen and consider adding bran buds or cereal for additional fiber.  - Discussed the importance of maintaining a high-fiber diet and adequate hydration.  - Advised to monitor bowel movements and report any persistent issues.    8. Health maintenance.  - Overdue for a mammogram and a Pap smear.  - Advised to schedule these screenings as soon as possible.  - Discussed the importance of regular health screenings.  - Encouraged to follow up with a gynecologist for routine care.         Follow Up   No follow-ups on file.  Patient was given instructions and counseling regarding her condition or for health maintenance advice. Please see specific information pulled into the AVS if appropriate.           Yudelka Hart MD   17:37 EDT   [unfilled]

## 2025-08-21 ENCOUNTER — HOSPITAL ENCOUNTER (OUTPATIENT)
Dept: ULTRASOUND IMAGING | Facility: HOSPITAL | Age: 55
Discharge: HOME | End: 2025-08-21
Admitting: UROLOGY
Payer: COMMERCIAL

## 2025-08-21 DIAGNOSIS — N20.0 NEPHROLITHIASIS: ICD-10-CM

## 2025-08-21 PROCEDURE — 76775 US EXAM ABDO BACK WALL LIM: CPT

## 2025-08-27 ENCOUNTER — OFFICE VISIT (OUTPATIENT)
Dept: UROLOGY | Facility: CLINIC | Age: 55
End: 2025-08-27
Payer: COMMERCIAL

## 2025-08-27 VITALS — BODY MASS INDEX: 30.81 KG/M2 | WEIGHT: 208 LBS | HEIGHT: 69 IN

## 2025-08-27 DIAGNOSIS — N20.0 NEPHROLITHIASIS: Primary | ICD-10-CM

## 2025-08-27 PROCEDURE — 99213 OFFICE O/P EST LOW 20 MIN: CPT | Performed by: UROLOGY

## (undated) DEVICE — WRIST AND FOREARM: Brand: DEROYAL

## (undated) DEVICE — INTENDED FOR TISSUE SEPARATION, AND OTHER PROCEDURES THAT REQUIRE A SHARP SURGICAL BLADE TO PUNCTURE OR CUT.: Brand: BARD-PARKER ® CARBON RIB-BACK BLADES

## (undated) DEVICE — GLV SURG BIOGEL LTX PF 8

## (undated) DEVICE — SUT MNCRYL 3/0 PS2 18IN Y497G

## (undated) DEVICE — SUT VIC 2/0 CT1 CR8 18IN J839D

## (undated) DEVICE — SKIN PREP TRAY W/CHG: Brand: MEDLINE INDUSTRIES, INC.

## (undated) DEVICE — DISPOSABLE TOURNIQUET CUFF SINGLE BLADDER, SINGLE PORT AND QUICK CONNECT CONNECTOR: Brand: COLOR CUFF

## (undated) DEVICE — BNDG ELAS ELITE V/CLOSE 4IN 5YD LF STRL

## (undated) DEVICE — UNDYED BRAIDED (POLYGLACTIN 910), SYNTHETIC ABSORBABLE SUTURE: Brand: COATED VICRYL

## (undated) DEVICE — UNDERCAST PADDING: Brand: DEROYAL

## (undated) DEVICE — OCCLUSIVE GAUZE STRIP,3% BISMUTH TRIBROMOPHENATE IN PETROLATUM BLEND: Brand: XEROFORM

## (undated) DEVICE — ARM SLING: Brand: DEROYAL

## (undated) DEVICE — TOWEL,OR,DSP,ST,BLUE,STD,4/PK,20PK/CS: Brand: MEDLINE

## (undated) DEVICE — PK ORTHO MINOR TOWER 40

## (undated) DEVICE — 3M™ STERI-STRIP™ REINFORCED ADHESIVE SKIN CLOSURES, R1547, 1/2 IN X 4 IN (12 MM X 100 MM), 6 STRIPS/ENVELOPE: Brand: 3M™ STERI-STRIP™

## (undated) DEVICE — SPNG GZ STRL 2S 4X4 12PLY

## (undated) DEVICE — STCKNT IMPERV 9X36IN STRL

## (undated) DEVICE — GLV SURG TRIUMPH CLASSIC PF LTX 7.5 STRL